# Patient Record
Sex: MALE | Race: WHITE | Employment: OTHER | URBAN - METROPOLITAN AREA
[De-identification: names, ages, dates, MRNs, and addresses within clinical notes are randomized per-mention and may not be internally consistent; named-entity substitution may affect disease eponyms.]

---

## 2024-02-22 ENCOUNTER — APPOINTMENT (OUTPATIENT)
Dept: GENERAL RADIOLOGY | Age: 79
DRG: 948 | End: 2024-02-22
Payer: COMMERCIAL

## 2024-02-22 ENCOUNTER — HOSPITAL ENCOUNTER (INPATIENT)
Age: 79
LOS: 3 days | Discharge: HOME HEALTH | DRG: 948 | End: 2024-02-25
Attending: EMERGENCY MEDICINE | Admitting: FAMILY MEDICINE
Payer: COMMERCIAL

## 2024-02-22 DIAGNOSIS — R09.02 HYPOXIA: ICD-10-CM

## 2024-02-22 DIAGNOSIS — R55 SYNCOPE AND COLLAPSE: ICD-10-CM

## 2024-02-22 DIAGNOSIS — R07.89 ATYPICAL CHEST PAIN: ICD-10-CM

## 2024-02-22 DIAGNOSIS — R50.9 FEVER, UNSPECIFIED FEVER CAUSE: Primary | ICD-10-CM

## 2024-02-22 LAB
ALBUMIN SERPL-MCNC: 3.6 G/DL (ref 3.5–5.2)
ALBUMIN/GLOB SERPL: 1 {RATIO} (ref 1–2.5)
ALP SERPL-CCNC: 88 U/L (ref 40–129)
ALT SERPL-CCNC: 20 U/L (ref 5–41)
ANION GAP SERPL CALCULATED.3IONS-SCNC: 9 MMOL/L (ref 9–17)
AST SERPL-CCNC: 16 U/L
BASOPHILS # BLD: 0.1 K/UL (ref 0–0.2)
BASOPHILS NFR BLD: 1 % (ref 0–2)
BILIRUB SERPL-MCNC: 1.5 MG/DL (ref 0.3–1.2)
BILIRUB UR QL STRIP: NEGATIVE
BUN SERPL-MCNC: 24 MG/DL (ref 8–23)
CALCIUM SERPL-MCNC: 8.5 MG/DL (ref 8.6–10.4)
CHLORIDE SERPL-SCNC: 99 MMOL/L (ref 98–107)
CLARITY UR: CLEAR
CO2 SERPL-SCNC: 31 MMOL/L (ref 20–31)
COLOR UR: YELLOW
COMMENT: ABNORMAL
CREAT SERPL-MCNC: 1.7 MG/DL (ref 0.7–1.2)
EOSINOPHIL # BLD: 0 K/UL (ref 0–0.4)
EOSINOPHILS RELATIVE PERCENT: 0 % (ref 1–4)
ERYTHROCYTE [DISTWIDTH] IN BLOOD BY AUTOMATED COUNT: 19 % (ref 12.5–15.4)
FLUAV AG SPEC QL: NEGATIVE
FLUBV AG SPEC QL: NEGATIVE
GFR SERPL CREATININE-BSD FRML MDRD: 41 ML/MIN/1.73M2
GLUCOSE SERPL-MCNC: 186 MG/DL (ref 70–99)
GLUCOSE UR STRIP-MCNC: ABNORMAL MG/DL
HCT VFR BLD AUTO: 48.4 % (ref 41–53)
HGB BLD-MCNC: 15.8 G/DL (ref 13.5–17.5)
HGB UR QL STRIP.AUTO: NEGATIVE
KETONES UR STRIP-MCNC: NEGATIVE MG/DL
LACTATE BLDV-SCNC: 1.4 MMOL/L (ref 0.5–1.9)
LEUKOCYTE ESTERASE UR QL STRIP: NEGATIVE
LYMPHOCYTES NFR BLD: 1 K/UL (ref 1–4.8)
LYMPHOCYTES RELATIVE PERCENT: 10 % (ref 24–44)
MCH RBC QN AUTO: 28.8 PG (ref 26–34)
MCHC RBC AUTO-ENTMCNC: 32.5 G/DL (ref 31–37)
MCV RBC AUTO: 88.5 FL (ref 80–100)
MONOCYTES NFR BLD: 0.5 K/UL (ref 0.1–1.2)
MONOCYTES NFR BLD: 5 % (ref 2–11)
NEUTROPHILS NFR BLD: 84 % (ref 36–66)
NEUTS SEG NFR BLD: 8.5 K/UL (ref 1.8–7.7)
NITRITE UR QL STRIP: NEGATIVE
PH UR STRIP: 7.5 [PH] (ref 5–8)
PLATELET # BLD AUTO: 133 K/UL (ref 140–450)
PMV BLD AUTO: 8.1 FL (ref 6–12)
POTASSIUM SERPL-SCNC: 4.8 MMOL/L (ref 3.7–5.3)
PROT SERPL-MCNC: 7.2 G/DL (ref 6.4–8.3)
PROT UR STRIP-MCNC: NEGATIVE MG/DL
RBC # BLD AUTO: 5.47 M/UL (ref 4.5–5.9)
SARS-COV-2 RDRP RESP QL NAA+PROBE: NOT DETECTED
SODIUM SERPL-SCNC: 139 MMOL/L (ref 135–144)
SP GR UR STRIP: 1.01 (ref 1–1.03)
SPECIMEN DESCRIPTION: NORMAL
TROPONIN I SERPL HS-MCNC: 83 NG/L (ref 0–22)
TROPONIN I SERPL HS-MCNC: 88 NG/L (ref 0–22)
UROBILINOGEN UR STRIP-ACNC: NORMAL EU/DL (ref 0–1)
WBC OTHER # BLD: 10.1 K/UL (ref 3.5–11)

## 2024-02-22 PROCEDURE — 85025 COMPLETE CBC W/AUTO DIFF WBC: CPT

## 2024-02-22 PROCEDURE — 81003 URINALYSIS AUTO W/O SCOPE: CPT

## 2024-02-22 PROCEDURE — 83605 ASSAY OF LACTIC ACID: CPT

## 2024-02-22 PROCEDURE — 1210000000 HC MED SURG R&B

## 2024-02-22 PROCEDURE — 36415 COLL VENOUS BLD VENIPUNCTURE: CPT

## 2024-02-22 PROCEDURE — 80053 COMPREHEN METABOLIC PANEL: CPT

## 2024-02-22 PROCEDURE — 87040 BLOOD CULTURE FOR BACTERIA: CPT

## 2024-02-22 PROCEDURE — G0378 HOSPITAL OBSERVATION PER HR: HCPCS

## 2024-02-22 PROCEDURE — 87635 SARS-COV-2 COVID-19 AMP PRB: CPT

## 2024-02-22 PROCEDURE — 84484 ASSAY OF TROPONIN QUANT: CPT

## 2024-02-22 PROCEDURE — 93005 ELECTROCARDIOGRAM TRACING: CPT | Performed by: EMERGENCY MEDICINE

## 2024-02-22 PROCEDURE — 71045 X-RAY EXAM CHEST 1 VIEW: CPT

## 2024-02-22 PROCEDURE — 99285 EMERGENCY DEPT VISIT HI MDM: CPT

## 2024-02-22 PROCEDURE — 87804 INFLUENZA ASSAY W/OPTIC: CPT

## 2024-02-22 RX ORDER — LOSARTAN POTASSIUM 25 MG/1
25 TABLET ORAL DAILY
Status: ON HOLD | COMMUNITY
Start: 2023-04-07

## 2024-02-22 RX ORDER — SODIUM CHLORIDE 9 MG/ML
INJECTION, SOLUTION INTRAVENOUS PRN
Status: DISCONTINUED | OUTPATIENT
Start: 2024-02-22 | End: 2024-02-25 | Stop reason: HOSPADM

## 2024-02-22 RX ORDER — ATORVASTATIN CALCIUM 40 MG/1
80 TABLET, FILM COATED ORAL NIGHTLY
Status: DISCONTINUED | OUTPATIENT
Start: 2024-02-23 | End: 2024-02-25 | Stop reason: HOSPADM

## 2024-02-22 RX ORDER — DEXTROSE MONOHYDRATE 100 MG/ML
INJECTION, SOLUTION INTRAVENOUS CONTINUOUS PRN
Status: DISCONTINUED | OUTPATIENT
Start: 2024-02-22 | End: 2024-02-25 | Stop reason: HOSPADM

## 2024-02-22 RX ORDER — CARVEDILOL 6.25 MG/1
6.25 TABLET ORAL 2 TIMES DAILY WITH MEALS
Status: ON HOLD | COMMUNITY
Start: 2023-07-08 | End: 2024-02-25 | Stop reason: HOSPADM

## 2024-02-22 RX ORDER — CALCITRIOL 0.25 UG/1
0.25 CAPSULE, LIQUID FILLED ORAL AS NEEDED
Status: ON HOLD | COMMUNITY
Start: 2023-07-08

## 2024-02-22 RX ORDER — SODIUM CHLORIDE 0.9 % (FLUSH) 0.9 %
10 SYRINGE (ML) INJECTION PRN
Status: DISCONTINUED | OUTPATIENT
Start: 2024-02-22 | End: 2024-02-25 | Stop reason: HOSPADM

## 2024-02-22 RX ORDER — TORSEMIDE 20 MG/1
1 TABLET ORAL DAILY
Status: ON HOLD | COMMUNITY
Start: 2024-01-19

## 2024-02-22 RX ORDER — MIRTAZAPINE 7.5 MG/1
7.5 TABLET, FILM COATED ORAL NIGHTLY
Status: ON HOLD | COMMUNITY
Start: 2023-02-18

## 2024-02-22 RX ORDER — DAPAGLIFLOZIN 10 MG/1
10 TABLET, FILM COATED ORAL DAILY
Status: ON HOLD | COMMUNITY
Start: 2023-12-07

## 2024-02-22 RX ORDER — NITROGLYCERIN 0.4 MG/1
0.4 TABLET SUBLINGUAL EVERY 5 MIN PRN
Status: DISCONTINUED | OUTPATIENT
Start: 2024-02-22 | End: 2024-02-25 | Stop reason: HOSPADM

## 2024-02-22 RX ORDER — ASPIRIN 81 MG/1
81 TABLET, CHEWABLE ORAL DAILY
Status: DISCONTINUED | OUTPATIENT
Start: 2024-02-23 | End: 2024-02-25 | Stop reason: HOSPADM

## 2024-02-22 RX ORDER — SODIUM CHLORIDE 0.9 % (FLUSH) 0.9 %
5-40 SYRINGE (ML) INJECTION EVERY 12 HOURS SCHEDULED
Status: DISCONTINUED | OUTPATIENT
Start: 2024-02-23 | End: 2024-02-25 | Stop reason: HOSPADM

## 2024-02-22 RX ORDER — ONDANSETRON 2 MG/ML
4 INJECTION INTRAMUSCULAR; INTRAVENOUS EVERY 6 HOURS PRN
Status: DISCONTINUED | OUTPATIENT
Start: 2024-02-22 | End: 2024-02-25 | Stop reason: HOSPADM

## 2024-02-22 RX ORDER — GLUCAGON 1 MG/ML
1 KIT INJECTION PRN
Status: DISCONTINUED | OUTPATIENT
Start: 2024-02-22 | End: 2024-02-25 | Stop reason: HOSPADM

## 2024-02-22 RX ORDER — ONDANSETRON 4 MG/1
4 TABLET, ORALLY DISINTEGRATING ORAL EVERY 8 HOURS PRN
Status: DISCONTINUED | OUTPATIENT
Start: 2024-02-22 | End: 2024-02-25 | Stop reason: HOSPADM

## 2024-02-22 RX ORDER — ACETAMINOPHEN 650 MG/1
650 SUPPOSITORY RECTAL EVERY 6 HOURS PRN
Status: DISCONTINUED | OUTPATIENT
Start: 2024-02-22 | End: 2024-02-25 | Stop reason: HOSPADM

## 2024-02-22 RX ORDER — POTASSIUM CHLORIDE 20 MEQ/1
40 TABLET, EXTENDED RELEASE ORAL PRN
Status: DISCONTINUED | OUTPATIENT
Start: 2024-02-22 | End: 2024-02-25 | Stop reason: HOSPADM

## 2024-02-22 RX ORDER — ACETAMINOPHEN 325 MG/1
650 TABLET ORAL EVERY 6 HOURS PRN
Status: DISCONTINUED | OUTPATIENT
Start: 2024-02-22 | End: 2024-02-25 | Stop reason: HOSPADM

## 2024-02-22 RX ORDER — SEMAGLUTIDE 0.68 MG/ML
INJECTION, SOLUTION SUBCUTANEOUS
Status: ON HOLD | COMMUNITY
Start: 2024-01-03

## 2024-02-22 RX ORDER — MAGNESIUM SULFATE 1 G/100ML
1000 INJECTION INTRAVENOUS PRN
Status: DISCONTINUED | OUTPATIENT
Start: 2024-02-22 | End: 2024-02-25 | Stop reason: HOSPADM

## 2024-02-22 RX ORDER — INSULIN LISPRO 100 [IU]/ML
0-4 INJECTION, SOLUTION INTRAVENOUS; SUBCUTANEOUS
Status: DISCONTINUED | OUTPATIENT
Start: 2024-02-23 | End: 2024-02-25 | Stop reason: HOSPADM

## 2024-02-22 RX ORDER — POTASSIUM CHLORIDE 7.45 MG/ML
10 INJECTION INTRAVENOUS PRN
Status: DISCONTINUED | OUTPATIENT
Start: 2024-02-22 | End: 2024-02-25 | Stop reason: HOSPADM

## 2024-02-22 RX ORDER — FAMOTIDINE 40 MG/1
40 TABLET, FILM COATED ORAL DAILY
Status: ON HOLD | COMMUNITY
Start: 2023-07-08

## 2024-02-22 RX ORDER — INSULIN LISPRO 100 [IU]/ML
0-4 INJECTION, SOLUTION INTRAVENOUS; SUBCUTANEOUS NIGHTLY
Status: DISCONTINUED | OUTPATIENT
Start: 2024-02-23 | End: 2024-02-25 | Stop reason: HOSPADM

## 2024-02-22 ASSESSMENT — PAIN - FUNCTIONAL ASSESSMENT: PAIN_FUNCTIONAL_ASSESSMENT: NONE - DENIES PAIN

## 2024-02-23 PROBLEM — R79.89 ELEVATED TROPONIN: Status: ACTIVE | Noted: 2024-02-23
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
## 2024-02-23 PROBLEM — I25.10 CAD (CORONARY ARTERY DISEASE): Status: ACTIVE | Noted: 2024-02-23

## 2024-02-23 PROBLEM — I50.9 CHF (CONGESTIVE HEART FAILURE) (HCC): Status: ACTIVE | Noted: 2024-02-23

## 2024-02-23 PROBLEM — I25.5 ISCHEMIC CARDIOMYOPATHY: Status: ACTIVE | Noted: 2024-02-23

## 2024-02-23 PROBLEM — I10 HYPERTENSION: Status: ACTIVE | Noted: 2024-02-23

## 2024-02-23 PROBLEM — Z95.810 ICD (IMPLANTABLE CARDIOVERTER-DEFIBRILLATOR) IN PLACE: Status: ACTIVE | Noted: 2024-02-23

## 2024-02-23 PROBLEM — E11.9 DIABETES MELLITUS (HCC): Status: ACTIVE | Noted: 2024-02-23

## 2024-02-23 LAB
ALBUMIN SERPL-MCNC: 3.3 G/DL (ref 3.5–5.2)
ALBUMIN/GLOB SERPL: 0.9 {RATIO} (ref 1–2.5)
ALP SERPL-CCNC: 81 U/L (ref 40–129)
ALT SERPL-CCNC: 18 U/L (ref 5–41)
ANION GAP SERPL CALCULATED.3IONS-SCNC: 8 MMOL/L (ref 9–17)
AST SERPL-CCNC: 16 U/L
B PARAP IS1001 DNA NPH QL NAA+NON-PROBE: NOT DETECTED
B PERT DNA SPEC QL NAA+PROBE: NOT DETECTED
BILIRUB SERPL-MCNC: 1.5 MG/DL (ref 0.3–1.2)
BUN SERPL-MCNC: 21 MG/DL (ref 8–23)
C PNEUM DNA NPH QL NAA+NON-PROBE: NOT DETECTED
CALCIUM SERPL-MCNC: 8.4 MG/DL (ref 8.6–10.4)
CHLORIDE SERPL-SCNC: 102 MMOL/L (ref 98–107)
CHOLEST SERPL-MCNC: 134 MG/DL
CHOLESTEROL/HDL RATIO: 2.9
CO2 SERPL-SCNC: 29 MMOL/L (ref 20–31)
CREAT SERPL-MCNC: 1.5 MG/DL (ref 0.7–1.2)
EKG ATRIAL RATE: 97 BPM
EKG P AXIS: 79 DEGREES
EKG P-R INTERVAL: 238 MS
EKG Q-T INTERVAL: 372 MS
EKG QRS DURATION: 148 MS
EKG QTC CALCULATION (BAZETT): 472 MS
EKG R AXIS: -43 DEGREES
EKG T AXIS: 100 DEGREES
EKG VENTRICULAR RATE: 97 BPM
ERYTHROCYTE [DISTWIDTH] IN BLOOD BY AUTOMATED COUNT: 18.8 % (ref 12.5–15.4)
FLUAV RNA NPH QL NAA+NON-PROBE: NOT DETECTED
FLUBV RNA NPH QL NAA+NON-PROBE: NOT DETECTED
GFR SERPL CREATININE-BSD FRML MDRD: 48 ML/MIN/1.73M2
GLUCOSE BLD-MCNC: 102 MG/DL (ref 75–110)
GLUCOSE BLD-MCNC: 128 MG/DL (ref 75–110)
GLUCOSE SERPL-MCNC: 98 MG/DL (ref 70–99)
HADV DNA NPH QL NAA+NON-PROBE: NOT DETECTED
HCOV 229E RNA NPH QL NAA+NON-PROBE: NOT DETECTED
HCOV HKU1 RNA NPH QL NAA+NON-PROBE: NOT DETECTED
HCOV NL63 RNA NPH QL NAA+NON-PROBE: NOT DETECTED
HCOV OC43 RNA NPH QL NAA+NON-PROBE: NOT DETECTED
HCT VFR BLD AUTO: 47.3 % (ref 41–53)
HDLC SERPL-MCNC: 47 MG/DL
HGB BLD-MCNC: 15.3 G/DL (ref 13.5–17.5)
HMPV RNA NPH QL NAA+NON-PROBE: NOT DETECTED
HPIV1 RNA NPH QL NAA+NON-PROBE: NOT DETECTED
HPIV2 RNA NPH QL NAA+NON-PROBE: NOT DETECTED
HPIV3 RNA NPH QL NAA+NON-PROBE: NOT DETECTED
HPIV4 RNA NPH QL NAA+NON-PROBE: NOT DETECTED
INR PPP: 1
LDLC SERPL CALC-MCNC: 60 MG/DL (ref 0–130)
M PNEUMO DNA NPH QL NAA+NON-PROBE: NOT DETECTED
MAGNESIUM SERPL-MCNC: 2.4 MG/DL (ref 1.6–2.6)
MCH RBC QN AUTO: 28.7 PG (ref 26–34)
MCHC RBC AUTO-ENTMCNC: 32.3 G/DL (ref 31–37)
MCV RBC AUTO: 88.6 FL (ref 80–100)
PLATELET # BLD AUTO: 121 K/UL (ref 140–450)
PMV BLD AUTO: 8.2 FL (ref 6–12)
POTASSIUM SERPL-SCNC: 4.3 MMOL/L (ref 3.7–5.3)
PROT SERPL-MCNC: 6.8 G/DL (ref 6.4–8.3)
PROTHROMBIN TIME: 10.9 SEC (ref 9.4–12.6)
RBC # BLD AUTO: 5.34 M/UL (ref 4.5–5.9)
RSV BY PCR: NEGATIVE
RSV RNA NPH QL NAA+NON-PROBE: NOT DETECTED
RV+EV RNA NPH QL NAA+NON-PROBE: NOT DETECTED
SARS-COV-2 RNA NPH QL NAA+NON-PROBE: NOT DETECTED
SODIUM SERPL-SCNC: 139 MMOL/L (ref 135–144)
SPECIMEN DESCRIPTION: NORMAL
SPECIMEN SOURCE: NORMAL
TRIGL SERPL-MCNC: 136 MG/DL
TROPONIN I SERPL HS-MCNC: 78 NG/L (ref 0–22)
TROPONIN I SERPL HS-MCNC: 90 NG/L (ref 0–22)
TSH SERPL DL<=0.05 MIU/L-ACNC: 2.96 UIU/ML (ref 0.3–5)
WBC OTHER # BLD: 9.1 K/UL (ref 3.5–11)

## 2024-02-23 PROCEDURE — 2580000003 HC RX 258: Performed by: FAMILY MEDICINE

## 2024-02-23 PROCEDURE — 84443 ASSAY THYROID STIM HORMONE: CPT

## 2024-02-23 PROCEDURE — 6370000000 HC RX 637 (ALT 250 FOR IP): Performed by: INTERNAL MEDICINE

## 2024-02-23 PROCEDURE — 85027 COMPLETE CBC AUTOMATED: CPT

## 2024-02-23 PROCEDURE — 84484 ASSAY OF TROPONIN QUANT: CPT

## 2024-02-23 PROCEDURE — G0378 HOSPITAL OBSERVATION PER HR: HCPCS

## 2024-02-23 PROCEDURE — 0202U NFCT DS 22 TRGT SARS-COV-2: CPT

## 2024-02-23 PROCEDURE — 36415 COLL VENOUS BLD VENIPUNCTURE: CPT

## 2024-02-23 PROCEDURE — 80053 COMPREHEN METABOLIC PANEL: CPT

## 2024-02-23 PROCEDURE — 87798 DETECT AGENT NOS DNA AMP: CPT

## 2024-02-23 PROCEDURE — 6370000000 HC RX 637 (ALT 250 FOR IP): Performed by: NURSE PRACTITIONER

## 2024-02-23 PROCEDURE — 82947 ASSAY GLUCOSE BLOOD QUANT: CPT

## 2024-02-23 PROCEDURE — 99223 1ST HOSP IP/OBS HIGH 75: CPT | Performed by: FAMILY MEDICINE

## 2024-02-23 PROCEDURE — 83735 ASSAY OF MAGNESIUM: CPT

## 2024-02-23 PROCEDURE — 2580000003 HC RX 258: Performed by: NURSE PRACTITIONER

## 2024-02-23 PROCEDURE — 97116 GAIT TRAINING THERAPY: CPT

## 2024-02-23 PROCEDURE — 85610 PROTHROMBIN TIME: CPT

## 2024-02-23 PROCEDURE — 97166 OT EVAL MOD COMPLEX 45 MIN: CPT

## 2024-02-23 PROCEDURE — 97535 SELF CARE MNGMENT TRAINING: CPT

## 2024-02-23 PROCEDURE — 97162 PT EVAL MOD COMPLEX 30 MIN: CPT

## 2024-02-23 PROCEDURE — 2060000000 HC ICU INTERMEDIATE R&B

## 2024-02-23 PROCEDURE — 80061 LIPID PANEL: CPT

## 2024-02-23 RX ORDER — CARVEDILOL 3.12 MG/1
3.12 TABLET ORAL 2 TIMES DAILY WITH MEALS
Status: DISCONTINUED | OUTPATIENT
Start: 2024-02-23 | End: 2024-02-25 | Stop reason: HOSPADM

## 2024-02-23 RX ORDER — SODIUM CHLORIDE 9 MG/ML
INJECTION, SOLUTION INTRAVENOUS CONTINUOUS
Status: DISCONTINUED | OUTPATIENT
Start: 2024-02-23 | End: 2024-02-24

## 2024-02-23 RX ORDER — LOSARTAN POTASSIUM 25 MG/1
25 TABLET ORAL DAILY
Status: DISCONTINUED | OUTPATIENT
Start: 2024-02-23 | End: 2024-02-25 | Stop reason: HOSPADM

## 2024-02-23 RX ADMIN — ASPIRIN 81 MG CHEWABLE TABLET 81 MG: 81 TABLET CHEWABLE at 08:23

## 2024-02-23 RX ADMIN — LOSARTAN POTASSIUM 25 MG: 25 TABLET, FILM COATED ORAL at 08:23

## 2024-02-23 RX ADMIN — EMPAGLIFLOZIN 10 MG: 10 TABLET, FILM COATED ORAL at 08:23

## 2024-02-23 RX ADMIN — CARVEDILOL 3.12 MG: 3.12 TABLET, FILM COATED ORAL at 17:37

## 2024-02-23 RX ADMIN — ATORVASTATIN CALCIUM 80 MG: 40 TABLET, FILM COATED ORAL at 19:33

## 2024-02-23 RX ADMIN — SODIUM CHLORIDE: 9 INJECTION, SOLUTION INTRAVENOUS at 09:49

## 2024-02-23 RX ADMIN — SODIUM CHLORIDE, PRESERVATIVE FREE 10 ML: 5 INJECTION INTRAVENOUS at 08:24

## 2024-02-23 RX ADMIN — APIXABAN 5 MG: 5 TABLET, FILM COATED ORAL at 19:33

## 2024-02-23 RX ADMIN — ACETAMINOPHEN 650 MG: 325 TABLET ORAL at 08:23

## 2024-02-23 RX ADMIN — CARVEDILOL 3.12 MG: 3.12 TABLET, FILM COATED ORAL at 08:23

## 2024-02-23 RX ADMIN — APIXABAN 5 MG: 5 TABLET, FILM COATED ORAL at 00:26

## 2024-02-23 RX ADMIN — APIXABAN 5 MG: 5 TABLET, FILM COATED ORAL at 08:23

## 2024-02-23 RX ADMIN — ATORVASTATIN CALCIUM 80 MG: 40 TABLET, FILM COATED ORAL at 00:26

## 2024-02-23 ASSESSMENT — PAIN SCALES - GENERAL
PAINLEVEL_OUTOF10: 3
PAINLEVEL_OUTOF10: 1

## 2024-02-23 ASSESSMENT — PAIN DESCRIPTION - ONSET: ONSET: ON-GOING

## 2024-02-23 ASSESSMENT — PAIN DESCRIPTION - LOCATION: LOCATION: GENERALIZED

## 2024-02-23 ASSESSMENT — PAIN DESCRIPTION - PAIN TYPE: TYPE: ACUTE PAIN

## 2024-02-23 ASSESSMENT — PAIN DESCRIPTION - ORIENTATION: ORIENTATION: OTHER (COMMENT)

## 2024-02-23 ASSESSMENT — PAIN DESCRIPTION - FREQUENCY: FREQUENCY: CONTINUOUS

## 2024-02-23 ASSESSMENT — PAIN - FUNCTIONAL ASSESSMENT: PAIN_FUNCTIONAL_ASSESSMENT: ACTIVITIES ARE NOT PREVENTED

## 2024-02-23 ASSESSMENT — PAIN DESCRIPTION - DESCRIPTORS: DESCRIPTORS: ACHING

## 2024-02-23 NOTE — H&P
(L) >60 mL/min/1.73m2    Calcium 8.4 (L) 8.6 - 10.4 mg/dL    Total Protein 6.8 6.4 - 8.3 g/dL    Albumin 3.3 (L) 3.5 - 5.2 g/dL    Albumin/Globulin Ratio 0.9 (L) 1.0 - 2.5    Total Bilirubin 1.5 (H) 0.3 - 1.2 mg/dL    Alkaline Phosphatase 81 40 - 129 U/L    ALT 18 5 - 41 U/L    AST 16 <40 U/L   Troponin    Collection Time: 02/23/24  5:00 AM   Result Value Ref Range    Troponin, High Sensitivity 90 (HH) 0 - 22 ng/L   TSH    Collection Time: 02/23/24  5:00 AM   Result Value Ref Range    TSH 2.96 0.30 - 5.00 uIU/mL   RSV RAPID ANTIGEN    Collection Time: 02/23/24 10:21 AM    Specimen: Nasopharyngeal Swab   Result Value Ref Range    Source .NASOPHARYNGEAL SWAB     RSV by PCR NEGATIVE NEGATIVE       Imaging/Diagnostics:    XR CHEST PORTABLE    Result Date: 2/23/2024  1. No acute cardiopulmonary process. 2. Cardiomegaly.       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 *      -Patient denies any syncope or collapse  -He did sustain a fall due to weakness  -Continue telemetry  -Cardiology evaluation, they did not recommend initiation of anticoagulation  -Viral PCR  -Blood cultures no growth to date  -Hold diuretics and initiate gentle hydration  -Restart

## 2024-02-23 NOTE — ED PROVIDER NOTES
[facility] Emergency Department  [address]  [facility] [address]  Phone: [phone]  Fax: [phone]      Pt Name: Kb Wilson  MRN:[id number]  Birthdate 1945  Date of evaluation: 2/22/2024    6  CHIEF COMPLAINT       Chief Complaint   Patient presents with    Fall     Pt reports he slide off chair RT generalized weakness     Cough     Pt reports productive cough that started yesterday afternoon , denies CP     Shortness of Breath       HISTORY OF PRESENT ILLNESS   78-year-old male presents to the emergency department today by EMS complaining of generalized fatigue and generalized myalgias.  He reports symptoms started yesterday as a \"cold.\"  He reports he did not sleep well last night and throughout the day has been progressively becoming weaker.  He reports that he has had a little bit of productive cough.  Some nasal congestion.  No fevers or chills.  No mitigating precipitating or exacerbating factors.  There is been no other contemporaneous evaluation or management except transported here by EMS when they got the EKG.  He denies chest pain.  No known sick contacts.    REVIEW OF SYSTEMS     Review of Systems   All other systems reviewed and are negative.        PAST MEDICAL HISTORY    has a past medical history of CHF (congestive heart failure) (HCC), Diabetes mellitus (HCC), and Hypertension.    SURGICAL HISTORY      has a past surgical history that includes Cardiac defibrillator placement.    CURRENT MEDICATIONS       Previous Medications    APIXABAN (ELIQUIS) 5 MG TABS TABLET    Take 1 tablet by mouth 2 times daily    CALCITRIOL (ROCALTROL) 0.25 MCG CAPSULE    Take 1 capsule by mouth as needed Three times weekly    CARVEDILOL (COREG) 6.25 MG TABLET    Take 1 tablet by mouth 2 times daily (with meals)    DAPAGLIFLOZIN (FARXIGA) 10 MG TABLET    Take 1 tablet by mouth daily    FAMOTIDINE (PEPCID) 40 MG TABLET    Take 1 tablet by mouth daily    INSULIN GLARGINE

## 2024-02-23 NOTE — CONSULTS
[facility] Consult      Name:   Kb Wilson :  1945   MRN:   [id number] Gender:   male   PCP:  Viridiana Taveras Age: 78 y.o.   PCP Fax:  [phone]     Hospital:          [facility]   encounter Date:     24        Reason for Consult: Known CAD    HPI: Kb Wilson is an 78 y.o. male admitted with weakness and fatigue.    Patient reports that over the past couple days he is felt like he has had a cold.  He has had a cough and felt intermittent chills and fevers.  With this, he had poor p.o. intake and had not eaten for over 24 hours.  He states he just laid in bed.  When he tried to get up to go get his medications he said he just Sinking lower and lower until he just ran out of energy and sat there.  He denies jennifer syncope.  Patient also denies any chest pain or significant shortness of breath.    Patient has known history of CAD and remote bypass approximately 15 years ago.  He states he had a cardiac catheterization approximately a year ago while he was living in [place].  He states he was told all bypass grafts were patent.    Patient follows with Dr. Sarah Beth Little.  He has been on goal-directed medical therapy and has a known ischemic cardiomyopathy with EF 25% range.  Patient has ICD last checked last fall and functioning appropriately.  He also has a history of PAF.    PAST MED/SURG HISTORY:     Past Medical History:   Diagnosis Date    CHF (congestive heart failure) (HCC)     Diabetes mellitus (HCC)     Hypertension        Past Surgical History:   Procedure Laterality Date    CARDIAC DEFIBRILLATOR PLACEMENT         Social History     Socioeconomic History    Marital status:      Spouse name: Not on file    Number of children: Not on file    Years of education: Not on file    Highest education level: Not on file   Occupational History    Not on file   Tobacco Use    Smoking status: Never    Smokeless tobacco: Never   Vaping Use    Vaping Use: Never used   Substance and

## 2024-02-24 LAB
ANION GAP SERPL CALCULATED.3IONS-SCNC: 6 MMOL/L (ref 9–17)
BASOPHILS # BLD: 0 K/UL (ref 0–0.2)
BASOPHILS NFR BLD: 0 % (ref 0–2)
BUN SERPL-MCNC: 25 MG/DL (ref 8–23)
CALCIUM SERPL-MCNC: 8.4 MG/DL (ref 8.6–10.4)
CHLORIDE SERPL-SCNC: 101 MMOL/L (ref 98–107)
CO2 SERPL-SCNC: 30 MMOL/L (ref 20–31)
CREAT SERPL-MCNC: 1.6 MG/DL (ref 0.7–1.2)
EOSINOPHIL # BLD: 0.1 K/UL (ref 0–0.4)
EOSINOPHILS RELATIVE PERCENT: 1 % (ref 1–4)
ERYTHROCYTE [DISTWIDTH] IN BLOOD BY AUTOMATED COUNT: 18.9 % (ref 12.5–15.4)
GFR SERPL CREATININE-BSD FRML MDRD: 44 ML/MIN/1.73M2
GLUCOSE SERPL-MCNC: 195 MG/DL (ref 70–99)
HCT VFR BLD AUTO: 43.8 % (ref 41–53)
HGB BLD-MCNC: 14.1 G/DL (ref 13.5–17.5)
LYMPHOCYTES NFR BLD: 1.2 K/UL (ref 1–4.8)
LYMPHOCYTES RELATIVE PERCENT: 21 % (ref 24–44)
MCH RBC QN AUTO: 28.5 PG (ref 26–34)
MCHC RBC AUTO-ENTMCNC: 32.2 G/DL (ref 31–37)
MCV RBC AUTO: 88.5 FL (ref 80–100)
MONOCYTES NFR BLD: 0.5 K/UL (ref 0.1–1.2)
MONOCYTES NFR BLD: 8 % (ref 2–11)
NEUTROPHILS NFR BLD: 70 % (ref 36–66)
NEUTS SEG NFR BLD: 4.2 K/UL (ref 1.8–7.7)
PLATELET # BLD AUTO: 119 K/UL (ref 140–450)
PMV BLD AUTO: 8 FL (ref 6–12)
POTASSIUM SERPL-SCNC: 4.8 MMOL/L (ref 3.7–5.3)
RBC # BLD AUTO: 4.94 M/UL (ref 4.5–5.9)
SODIUM SERPL-SCNC: 137 MMOL/L (ref 135–144)
WBC OTHER # BLD: 6 K/UL (ref 3.5–11)

## 2024-02-24 PROCEDURE — 6370000000 HC RX 637 (ALT 250 FOR IP): Performed by: NURSE PRACTITIONER

## 2024-02-24 PROCEDURE — 85025 COMPLETE CBC W/AUTO DIFF WBC: CPT

## 2024-02-24 PROCEDURE — 36415 COLL VENOUS BLD VENIPUNCTURE: CPT

## 2024-02-24 PROCEDURE — 6370000000 HC RX 637 (ALT 250 FOR IP): Performed by: INTERNAL MEDICINE

## 2024-02-24 PROCEDURE — 2580000003 HC RX 258: Performed by: NURSE PRACTITIONER

## 2024-02-24 PROCEDURE — G0378 HOSPITAL OBSERVATION PER HR: HCPCS

## 2024-02-24 PROCEDURE — 80048 BASIC METABOLIC PNL TOTAL CA: CPT

## 2024-02-24 PROCEDURE — 2060000000 HC ICU INTERMEDIATE R&B

## 2024-02-24 PROCEDURE — 99232 SBSQ HOSP IP/OBS MODERATE 35: CPT | Performed by: FAMILY MEDICINE

## 2024-02-24 RX ADMIN — EMPAGLIFLOZIN 10 MG: 10 TABLET, FILM COATED ORAL at 09:09

## 2024-02-24 RX ADMIN — ATORVASTATIN CALCIUM 80 MG: 40 TABLET, FILM COATED ORAL at 22:45

## 2024-02-24 RX ADMIN — ASPIRIN 81 MG CHEWABLE TABLET 81 MG: 81 TABLET CHEWABLE at 09:08

## 2024-02-24 RX ADMIN — CARVEDILOL 3.12 MG: 3.12 TABLET, FILM COATED ORAL at 17:18

## 2024-02-24 RX ADMIN — SODIUM CHLORIDE, PRESERVATIVE FREE 10 ML: 5 INJECTION INTRAVENOUS at 22:45

## 2024-02-24 RX ADMIN — APIXABAN 5 MG: 5 TABLET, FILM COATED ORAL at 22:45

## 2024-02-24 RX ADMIN — APIXABAN 5 MG: 5 TABLET, FILM COATED ORAL at 09:09

## 2024-02-24 RX ADMIN — SODIUM CHLORIDE, PRESERVATIVE FREE 10 ML: 5 INJECTION INTRAVENOUS at 09:08

## 2024-02-24 RX ADMIN — INSULIN LISPRO 1 UNITS: 100 INJECTION, SOLUTION INTRAVENOUS; SUBCUTANEOUS at 12:19

## 2024-02-24 RX ADMIN — CARVEDILOL 3.12 MG: 3.12 TABLET, FILM COATED ORAL at 09:08

## 2024-02-25 ENCOUNTER — APPOINTMENT (OUTPATIENT)
Dept: GENERAL RADIOLOGY | Age: 79
DRG: 948 | End: 2024-02-25
Payer: COMMERCIAL

## 2024-02-25 VITALS
HEART RATE: 78 BPM | HEIGHT: 75 IN | OXYGEN SATURATION: 96 % | SYSTOLIC BLOOD PRESSURE: 109 MMHG | RESPIRATION RATE: 14 BRPM | BODY MASS INDEX: 33.83 KG/M2 | TEMPERATURE: 98.4 F | DIASTOLIC BLOOD PRESSURE: 68 MMHG | WEIGHT: 272.05 LBS

## 2024-02-25 PROCEDURE — 99239 HOSP IP/OBS DSCHRG MGMT >30: CPT | Performed by: FAMILY MEDICINE

## 2024-02-25 PROCEDURE — 2060000000 HC ICU INTERMEDIATE R&B

## 2024-02-25 PROCEDURE — G0378 HOSPITAL OBSERVATION PER HR: HCPCS

## 2024-02-25 PROCEDURE — 97535 SELF CARE MNGMENT TRAINING: CPT

## 2024-02-25 PROCEDURE — 6370000000 HC RX 637 (ALT 250 FOR IP): Performed by: INTERNAL MEDICINE

## 2024-02-25 PROCEDURE — 2580000003 HC RX 258: Performed by: NURSE PRACTITIONER

## 2024-02-25 PROCEDURE — 2700000000 HC OXYGEN THERAPY PER DAY

## 2024-02-25 PROCEDURE — 71045 X-RAY EXAM CHEST 1 VIEW: CPT

## 2024-02-25 PROCEDURE — 6370000000 HC RX 637 (ALT 250 FOR IP): Performed by: NURSE PRACTITIONER

## 2024-02-25 PROCEDURE — 97116 GAIT TRAINING THERAPY: CPT

## 2024-02-25 RX ORDER — CARVEDILOL 3.12 MG/1
3.12 TABLET ORAL 2 TIMES DAILY WITH MEALS
Qty: 60 TABLET | Refills: 3 | Status: ON HOLD | OUTPATIENT
Start: 2024-02-25

## 2024-02-25 RX ORDER — ASPIRIN 81 MG/1
81 TABLET, CHEWABLE ORAL DAILY
Qty: 30 TABLET | Refills: 3 | Status: ON HOLD | OUTPATIENT
Start: 2024-02-26

## 2024-02-25 RX ORDER — ATORVASTATIN CALCIUM 80 MG/1
80 TABLET, FILM COATED ORAL NIGHTLY
Qty: 30 TABLET | Refills: 3 | Status: ON HOLD | OUTPATIENT
Start: 2024-02-25

## 2024-02-25 RX ADMIN — ASPIRIN 81 MG CHEWABLE TABLET 81 MG: 81 TABLET CHEWABLE at 09:43

## 2024-02-25 RX ADMIN — EMPAGLIFLOZIN 10 MG: 10 TABLET, FILM COATED ORAL at 09:44

## 2024-02-25 RX ADMIN — SODIUM CHLORIDE, PRESERVATIVE FREE 10 ML: 5 INJECTION INTRAVENOUS at 09:43

## 2024-02-25 RX ADMIN — APIXABAN 5 MG: 5 TABLET, FILM COATED ORAL at 09:44

## 2024-02-25 RX ADMIN — CARVEDILOL 3.12 MG: 3.12 TABLET, FILM COATED ORAL at 09:44

## 2024-02-25 NOTE — DISCHARGE INSTR - COC
Continuity of Care Form    Patient Name: Kb Wilson   :  1945  MRN:  [id number]    Admit date:  2024  Discharge date:  2024    Code Status Order: Full Code   Advance Directives:     Admitting Physician:  Marlena Hedrick MD  PCP: Viridiana Taveras    Discharging Nurse: Annie  Discharging Hospital Unit/Room#: 328/328-01  Discharging Unit Phone Number: [phone]    Emergency Contact:   Extended Emergency Contact Information  Primary Emergency Contact: Jarrett Salgado  Mobile Phone: [phone]  Relation: Other   needed? No    Past Surgical History:  Past Surgical History:   Procedure Laterality Date    CARDIAC DEFIBRILLATOR PLACEMENT      CORONARY ARTERY BYPASS GRAFT         Immunization History:     There is no immunization history on file for this patient.    Active Problems:  Patient Active Problem List   Diagnosis Code    Fall W19.XXXA    Hypertension I10    Diabetes mellitus (HCC) E11.9    CHF (congestive heart failure) (HCC) I50.9    CAD (coronary artery disease) I25.10    Elevated troponin R79.89    Ischemic cardiomyopathy I25.5    ICD (implantable cardioverter-defibrillator) in place Z95.810       Isolation/Infection:   Isolation            Droplet Plus          Patient Infection Status       None to display                     Nurse Assessment:  Last Vital Signs: /68   Pulse 78   Temp 98.4 °F (36.9 °C) (Oral)   Resp 14   Ht 1.905 m (6' 3\")   Wt 123.4 kg (272 lb 0.8 oz)   SpO2 96%   BMI 34.00 kg/m²     Last documented pain score (0-10 scale): Pain Level: 1  Last Weight:   Wt Readings from Last 1 Encounters:   24 123.4 kg (272 lb 0.8 oz)     Mental Status:  oriented and alert    IV Access:  - None    Nursing Mobility/ADLs:  Walking   Independent w/ walker  Transfer  Independent w/ walker  Bathing  Set up assistance  Dressing  Set up assistance  Toileting  Independent w/ walker  Feeding  Independent  Med Admin  Independent  Med Delivery   whole    Wound Care

## 2024-02-25 NOTE — PLAN OF CARE
Problem: Discharge Planning  Goal: Discharge to home or other facility with appropriate resources  2/23/2024 1035 by Annie Caban RN  Outcome: Progressing  Flowsheets (Taken 2/23/2024 0823)  Discharge to home or other facility with appropriate resources: Identify barriers to discharge with patient and caregiver  2/23/2024 0417 by Ana Lion RN  Outcome: Progressing  Flowsheets (Taken 2/23/2024 0030)  Discharge to home or other facility with appropriate resources: Identify barriers to discharge with patient and caregiver     Problem: Safety - Adult  Goal: Free from fall injury  2/23/2024 1035 by Annie Caban RN  Outcome: Progressing  2/23/2024 0417 by Ana Lion RN  Outcome: Progressing     Problem: ABCDS Injury Assessment  Goal: Absence of physical injury  2/23/2024 1035 by Annie Caban RN  Outcome: Progressing  2/23/2024 0417 by Ana Lion RN  Outcome: Progressing     Problem: Musculoskeletal - Adult  Goal: Return mobility to safest level of function  Outcome: Progressing  Goal: Return ADL status to a safe level of function  Outcome: Progressing    Care plan reviewed with patient.  Patient verbalize understanding of the plan of care at this time.   
  Problem: Discharge Planning  Goal: Discharge to home or other facility with appropriate resources  2/25/2024 0937 by Annie Caban RN  Outcome: Progressing  Flowsheets (Taken 2/25/2024 0935)  Discharge to home or other facility with appropriate resources: Identify barriers to discharge with patient and caregiver  2/25/2024 0448 by Jamila Caldwell RN  Outcome: Progressing     Problem: Safety - Adult  Goal: Free from fall injury  2/25/2024 0937 by Annie Caban RN  Outcome: Progressing  2/25/2024 0448 by Jamila Caldwell RN  Outcome: Progressing     Problem: ABCDS Injury Assessment  Goal: Absence of physical injury  2/25/2024 0937 by Annie Caban RN  Outcome: Progressing  2/25/2024 0448 by Jamila Caldwell RN  Outcome: Progressing     Problem: Musculoskeletal - Adult  Goal: Return mobility to safest level of function  2/25/2024 0937 by Annie Caban RN  Outcome: Progressing  2/25/2024 0448 by Jamila Caldwell RN  Outcome: Progressing  Goal: Return ADL status to a safe level of function  2/25/2024 0937 by Annie Caban RN  Outcome: Progressing  2/25/2024 0448 by Jamila Caldwell RN  Outcome: Progressing     Problem: Chronic Conditions and Co-morbidities  Goal: Patient's chronic conditions and co-morbidity symptoms are monitored and maintained or improved  2/25/2024 0937 by Annie Caban RN  Outcome: Progressing  2/25/2024 0448 by Jamila Caldwell RN  Outcome: Progressing     Problem: Pain  Goal: Verbalizes/displays adequate comfort level or baseline comfort level  2/25/2024 0937 by Annie Caban RN  Outcome: Progressing  2/25/2024 0448 by Jamila Caldwell RN  Outcome: Progressing     Care plan reviewed with patient.  Patient verbalized understanding of the plan of care at this time.  
  Problem: Discharge Planning  Goal: Discharge to home or other facility with appropriate resources  Outcome: Progressing     Problem: Safety - Adult  Goal: Free from fall injury  Outcome: Progressing     Problem: ABCDS Injury Assessment  Goal: Absence of physical injury  Outcome: Progressing     Problem: Musculoskeletal - Adult  Goal: Return mobility to safest level of function  Outcome: Progressing     Problem: Musculoskeletal - Adult  Goal: Return ADL status to a safe level of function  Outcome: Progressing     Problem: Chronic Conditions and Co-morbidities  Goal: Patient's chronic conditions and co-morbidity symptoms are monitored and maintained or improved  Outcome: Progressing     Problem: Pain  Goal: Verbalizes/displays adequate comfort level or baseline comfort level  Outcome: Progressing     
  Problem: Discharge Planning  Goal: Discharge to home or other facility with appropriate resources  Outcome: Progressing  Flowsheets (Taken 2/23/2024 0030)  Discharge to home or other facility with appropriate resources: Identify barriers to discharge with patient and caregiver     Problem: Safety - Adult  Goal: Free from fall injury  Outcome: Progressing   NO FALLS OR INJURIES THIS SHIFT, BED IN LOWEST POSITION, BRAKES ON, BED ALARM ON, CALL LIGHT IN REACH, SIDE RAILS UP X2.    Problem: ABCDS Injury Assessment  Goal: Absence of physical injury  Outcome: Progressing     
  Problem: Discharge Planning  Goal: Discharge to home or other facility with appropriate resources  Outcome: Progressing  Flowsheets (Taken 2/24/2024 1867)  Discharge to home or other facility with appropriate resources: Identify barriers to discharge with patient and caregiver     Problem: Safety - Adult  Goal: Free from fall injury  Outcome: Progressing     Problem: ABCDS Injury Assessment  Goal: Absence of physical injury  Outcome: Progressing     Problem: Musculoskeletal - Adult  Goal: Return mobility to safest level of function  Outcome: Progressing  Goal: Return ADL status to a safe level of function  Outcome: Progressing     Problem: Chronic Conditions and Co-morbidities  Goal: Patient's chronic conditions and co-morbidity symptoms are monitored and maintained or improved  Outcome: Progressing     Problem: Pain  Goal: Verbalizes/displays adequate comfort level or baseline comfort level  Outcome: Progressing    Care plan reviewed with patient.  Patient verbalized understanding of the plan of care at this time     
RN  Outcome: Completed  2/25/2024 0937 by Annie Caban RN  Outcome: Progressing  2/25/2024 0937 by Annie Caban RN  Outcome: Progressing  2/25/2024 0448 by Jamila Caldwell RN  Outcome: Progressing     Problem: Pain  Goal: Verbalizes/displays adequate comfort level or baseline comfort level  2/25/2024 1517 by Annie Caban RN  Outcome: Completed  2/25/2024 0937 by Annie Caban RN  Outcome: Progressing  2/25/2024 0937 by Annie Caban RN  Outcome: Progressing  2/25/2024 0448 by Jamila Caldwell RN  Outcome: Progressing     Problem: Respiratory - Adult  Goal: Achieves optimal ventilation and oxygenation  2/25/2024 1517 by Annie Caban RN  Outcome: Completed  2/25/2024 0937 by Annie Caban RN  Outcome: Progressing     D/C edu and paperwork complete. Patient verbalized understanding with no questions at this time. Home O2 delivered to patient's room. Home therapy set up per . PIV removed without complications. Awaiting patient's ride at this time.    1610: Patient transported to family's car via wheelchair. Care released.

## 2024-02-25 NOTE — DISCHARGE INSTR - DIET

## 2024-02-25 NOTE — CARE COORDINATION
Patient gave me choices of [facility] for O2 and [facility] for Home Care.  Referrals sent.  Called [email or web address] and they will get back to me.    1320-Called [facility] and spoke with Justyna, she will have our rep. Get back to us.    1329-[facility] can supply patient  oxygen and will be here shortly to deliver.     1345-[facility] can take patient/Diana at [phone].    1626-Informed Libbey at [facility] that patient has been discharged.

## 2024-02-25 NOTE — CARE COORDINATION
Case Management Assessment  Initial Evaluation    Date/Time of Evaluation: 2/25/2024 11:40 AM  Assessment Completed by: Yeimy Jimenez    If patient is discharged prior to next notation, then this note serves as note for discharge by case management.    Patient Name: Kb Wilson                   YOB: 1945  Diagnosis: Syncope and collapse [R55]  Atypical chest pain [R07.89]  Fever, unspecified fever cause [R50.9]  Elevated troponin [R79.89]                   Date / Time: 2/22/2024  6:51 PM    Patient Admission Status: Inpatient   Readmission Risk (Low < 19, Mod (19-27), High > 27): No data recorded  Current PCP: Viridiana Taveras  PCP verified by CM? Yes    Chart Reviewed: Yes      History Provided by: Patient  Patient Orientation: Alert and Oriented    Patient Cognition: Alert    Hospitalization in the last 30 days (Readmission):  No    If yes, Readmission Assessment in CM Navigator will be completed.    Advance Directives:      Code Status: Full Code   Patient's Primary Decision Maker is: Legal Next of Kin (brother)      Discharge Planning:    Patient lives with: Alone Type of Home: Other (Comment) (szymanski)  Primary Care Giver: Self  Patient Support Systems include: Family Members   Current Financial resources: Medicare  Current community resources: None  Current services prior to admission: Durable Medical Equipment (has a walker but has not used before admit)            Current DME: Walker            Type of Home Care services:  OT, PT (needs)    ADLS  Prior functional level: Independent in ADLs/IADLs  Current functional level: Independent in ADLs/IADLs    PT AM-PAC: 17 /24  OT AM-PAC: 21 /24    Family can provide assistance at DC: No (little)  Would you like Case Management to discuss the discharge plan with any other family members/significant others, and if so, who? Yes (brother if needed)  Plans to Return to Present Housing: Yes (with home care)  Other Identified Issues/Barriers to

## 2024-02-25 NOTE — PROGRESS NOTES
[facility] Samuel Awan M.D., M.H.A.  Dagoberto Baker M.D., M.B.A.  RAFAELA Hough P.A.C.    [facility]  [address] [facility] [facility]    Cardio-Oncology Service  [address], [address]  [place] [address]   [address] [place] [address]  Phone: [phone]   [place] [address]   Phone: [phone]  Fax: [phone]    [phone]   Fax: [phone]          DAILY HOSPITAL PROGRESS NOTE     # DAYS IN HOSPITAL: 0  ADMIT DATE: 2/22/2024        SUBJECTIVE:     Follow-up for patient with weakness and possible febrile illness.  History of significant coronary issues in the past with bypass surgery and ischemic cardiomyopathy status post ICD.    On admission he had poor p.o. intake and therefore diuretics were held.  Other goal-directed medical therapy initiated.    No new issues overnight per patient or nursing.  Still very weak.  PT OT have been consulted but have not seen the patient yet.        VITALS:       Vitals:    02/23/24 1921 02/23/24 2350 02/24/24 0820 02/24/24 0915   BP: 129/61 106/79 123/81    Pulse: 76 75 87    Resp: 16 16     Temp: 98.2 °F (36.8 °C) 98.2 °F (36.8 °C) 98.4 °F (36.9 °C)    TempSrc: Oral Oral Oral    SpO2: 90% 90% (!) 88% 93%   Weight:       Height:         I/O last 3 completed shifts:  In: 360 [P.O.:360]  Out: 1200 [Urine:1200]  I/O this shift:  In: 120 [P.O.:120]  Out: 350 [Urine:350]      PHYSICAL EXAM:     General: WDWN in NAD. A and O x 3  HEENT: EOMI, Sclera anicteric, Conjunctiva pink  Neck: Supple, No JVP or HJR, No carotid bruits  Lungs: Clear to A & P no rales  Heart: RRR  Abdomen: soft non tender, good bowel sounds, No HSM  Ext: no edema,  Skin: no discoloration  Vascular: distal pulses intact      CURRENT MEDICATIONS:      carvedilol  3.125 mg Oral BID WC    empagliflozin  10 mg Oral Daily    [Held by provider] losartan 
Home Oxygen Evaluation    Home Oxygen Evaluation completed.    Patient is on 2 liters per minute via nasal.  Resting SpO2 = 92%  Resting SpO2 on room air = 85%    Aniyah Elizondo RCP  10:45 AM  
Physical Therapy  Facility/Department: [address]  Physical Therapy Daily Treatment Note    Name: Kb Wilson  : 1945  MRN: [id number]  Date of Service: 2024    Discharge Recommendations:  Patient would benefit from continued therapy after discharge   PT Equipment Recommendations  Equipment Needed: No (has rolling walker at home)      Patient Diagnosis(es): The primary encounter diagnosis was Fever, unspecified fever cause. Diagnoses of Syncope and collapse and Atypical chest pain were also pertinent to this visit.  Past Medical History:  has a past medical history of CAD (coronary artery disease), CHF (congestive heart failure) (HCC), Diabetes mellitus (HCC), and Hypertension.  Past Surgical History:  has a past surgical history that includes Cardiac defibrillator placement and Coronary artery bypass graft.    Assessment   Body Structures, Functions, Activity Limitations Requiring Skilled Therapeutic Intervention: Decreased functional mobility ;Decreased strength;Decreased endurance;Decreased balance    Assessment: Pt presents with generalized weakness and impaired activity tolerance due to syncope and collapse. Pt lives alone and is Independent at baseline without device. Pt currently demonstrating supervision with bed mobility, supervision transfers, pt ambulated 140ft with rolling walker and supervision on 2L O2. Pt with chronic SOB and able to hold full conversation during functional activities. Pt aware of SOB and need for rest breaks. Pt should be able to return to previous living arrangement. Pt will benefit from continued skilled PT for endurance, safety and functional mobility training while in the hospital and at discharge.    Requires PT Follow-Up: Yes    Activity Tolerance  Activity Tolerance: Patient limited by endurance  Activity Tolerance Comments: Pt aware of chronic endurance issues     Plan   Physical Therapy Plan  General Plan:  (5-6x/week)  Current Treatment Recommendations: 
SPIRITUAL CARE DEPARTMENT - [facility]  PROGRESS NOTE    Room # 328/328-01   Name: Kb Wilson               Reason for visit: Routine    I visited the patient.    Admit Date & Time: 2/22/2024  6:51 PM    Assessment:  Kb Wilson is a 78 y.o. male in the hospital because \"fall\". Upon entering the room pt was lying in bed, with eyes closed       Intervention:  Writer offered  silent prayer for pt     Outcome:  Pt did not respond     Plan:  Chaplains will remain available to offer spiritual and emotional support as needed.    Electronically signed by Chaplain Negin, on 2/23/2024 at 1:51 PM.  Spiritual Care Department  [facility] -       02/23/24 1346   Encounter Summary   Service Provided For: Patient   Referral/Consult From: [facility]   Support System Unknown   Last Encounter  02/23/24   Complexity of Encounter Low   Begin Time 1325   End Time  1327   Total Time Calculated 2 min   Spiritual/Emotional needs   Type Spiritual Support   Assessment/Intervention/Outcome   Assessment Unable to assess   Intervention Prayer (assurance of)/[place]   Outcome Did not respond       
[facility]  Office: [phone]  Omar Bills DO, Salvador San DO, Lewis Celeste DO, Neo Stallings DO, Jaz Castellanos MD, Stacia Aponte MD, Carl Barajas MD, Marlena Hedrick MD,  Christian Quintero MD, Gregoria Marie MD, Riri Dent MD,  Yi Clay DO, Bethany Hall MD, Acosta Jimenez MD, Jarrett Bills DO, Diann Mari MD,  Oscar Fofana DO, Karen Bryant MD, Jemma Osei MD, Bonny Pimentel MD, Christian Sauer MD,  Nash Ramires MD, Riki Small MD, Veto Webster MD, Melia Alcantar MD, Mick Cast MD, Katelin Smart MD, Guy Tijerina DO, Alcides Sandoval DO, Saurabh Montano MD,  Bartolo Field MD, Shirley Waterhouse, CNP,  Olga Lidia Van CNP, Dagoberto Bobo, CNP,  Brandie Hammond, DNP, Misti Boone, CNP, Tania Sim, CNP, Kiah Montalvo CNP, Donna Meade CNP, Inés Rosas, CNP, Sylvie Calderon, PA-C, Dana Manzano, PA-C, Edith Hobson, CNP, Katherin Joyce, CNP, Ana Anderson, CNP, Jada Valerio, CNS, Tess Hernandez, CNP, Xnea Bailon CNP, Tracy Schwab, CNP         [facility]   IN-PATIENT SERVICE   [facility]    Progress Note    2/25/2024    11:21 AM    Name:   Kb Wilson  MRN:     [id number]     Acct:      [id number]   Room:   328/328-01   Day:  0  Admit Date:  2/22/2024  6:51 PM    PCP:   Viridiana Taveras  Code Status:  Full Code    Subjective:     C/C:   Chief Complaint   Patient presents with    Fall     Pt reports he slide off chair RT generalized weakness     Cough     Pt reports productive cough that started yesterday afternoon , denies CP     Shortness of Breath     Interval History Status: improved.     Patient seen and examined at bedside, no acute events overnight.  He is feeling much better today, better energy appetite and intake  He worked with therapy yesterday  Received gentle hydration  Now hypoxic needing oxygen  Patient vitals, labs and all providers notes were reviewed,from overnight shift and morning updates were 
order 2/23/24, Per RN on 2/25 pt has tested (-) for \"everything\", no COVID isolation    Subjective   General  Patient assessed for rehabilitation services?: Yes  Response to previous treatment: Patient with no complaints from previous session  Family / Caregiver Present: No  Subjective  Subjective: Patient did not report pain this date.  General Comment  Comments: RN OK for TX. Patient agreeable and cooperative.            Objective              Safety Devices  Type of Devices: Chair alarm in place;Gait belt;Nurse notified;Left in chair;Call light within reach  Restraints  Restraints Initially in Place: No    Balance  Sitting: Impaired (static-IND, dynamic-supervision)  Standing: Impaired (static/dynamic-supervision)  Toilet Transfers  Toilet Transfers Comments: declined need     ADL  Feeding: Independent  Grooming: Modified independent   Grooming Skilled Clinical Factors: standing at sink to brush teeth, wash face  LE Dressing: Supervision  LE Dressing Skilled Clinical Factors: seated EOB to don socks with increased time and effort, supervision standing for clothing management  Toileting Skilled Clinical Factors: declined needs, using urinal independently  Functional Mobility: Supervision;Modified independent   Functional Mobility Skilled Clinical Factors: MOD IND bed mob, sit<>stand/amb with RW supervision  Skin Care: Soap and water       Bed mobility  Rolling to Right: Modified independent (use of bed rail)  Supine to Sit: Supervision (Increase time and effort. Use of bed rail)  Scooting: Independent  Bed Mobility Comments: Pt sat on side of bed to and was able to adjust socks independently    Transfers  Sit to stand: Supervision  Stand to sit: Supervision  Transfer Comments: verbal cues for safety with use of walker     Cognition  Overall Cognitive Status: WFL  Orientation  Overall Orientation Status: Within Normal Limits  Orientation Level: Oriented X4                  Education Given To: Patient  Education 
  Neurological:  Positive for weakness. Negative for dizziness, light-headedness, numbness and headaches.   All other systems reviewed and are negative.      Medications:     Allergies:    Allergies   Allergen Reactions    Barium-Containing Compounds     Iodinated Contrast Media     Hydromorphone Hallucinations     weird dreams       Current Meds:   Scheduled Meds:    carvedilol  3.125 mg Oral BID WC    empagliflozin  10 mg Oral Daily    [Held by provider] losartan  25 mg Oral Daily    sodium chloride flush  5-40 mL IntraVENous 2 times per day    atorvastatin  80 mg Oral Nightly    apixaban  5 mg Oral BID    insulin lispro  0-4 Units SubCUTAneous TID     insulin lispro  0-4 Units SubCUTAneous Nightly    aspirin  81 mg Oral Daily     Continuous Infusions:    sodium chloride 50 mL/hr at 24 0949    dextrose      sodium chloride       PRN Meds: glucose, dextrose bolus **OR** dextrose bolus, glucagon (rDNA), dextrose, sodium chloride flush, sodium chloride, potassium chloride **OR** potassium alternative oral replacement **OR** potassium chloride, magnesium sulfate, ondansetron **OR** ondansetron, acetaminophen **OR** acetaminophen, magnesium hydroxide, nitroGLYCERIN, perflutren lipid microspheres    Data:     Past Medical History:   has a past medical history of CAD (coronary artery disease), CHF (congestive heart failure) (HCC), Diabetes mellitus (HCC), and Hypertension.    Social History:   reports that he has never smoked. He has never used smokeless tobacco. He reports current alcohol use. He reports that he does not use drugs.     Family History: History reviewed. No pertinent family history.    Vitals:  /79   Pulse 75   Temp 98.2 °F (36.8 °C) (Oral)   Resp 16   Ht 1.905 m (6' 3\")   Wt 123.4 kg (272 lb 0.8 oz)   SpO2 90%   BMI 34.00 kg/m²   Temp (24hrs), Av.3 °F (36.8 °C), Min:97.6 °F (36.4 °C), Max:99.1 °F (37.3 °C)    Recent Labs     24  0031 24  1637   POCGLU 102 128* 
Initially in Place: No    Balance  Sitting: Impaired (Static - SBA; Dynamic - CGA)  Standing: Impaired (Static/dynamic - CGA)    Toilet Transfers  Toilet Transfers Comments: denied  AROM: Within functional limits (BUE AROM WFL)  Strength: Within functional limits (BUE MMT WFL)  Coordination: Within functional limits  Sensation: Intact (Pt reports normal sensation grossly)    ADL  Feeding: Independent  Grooming: Stand by assistance  UE Bathing: Modified independent   LE Bathing: Minimal assistance  UE Dressing: Stand by assistance  LE Dressing: Contact guard assistance  LE Dressing Skilled Clinical Factors: CGA seated EOB for doffing/donning socks; increased time/effort to complete with rest breaks between socks due to SOB  Toileting: Contact guard assistance  Functional Mobility: Contact guard assistance  Functional Mobility Skilled Clinical Factors: CGA functional mobility in the room with and without use of RW; no LOB noted however pt more stable with RW at this time    Bed mobility  Supine to Sit: Contact guard assistance  Sit to Supine: Unable to assess  Scooting: Stand by assistance  Bed Mobility Comments: CGA supine>sitting EOB with HOB slightly elevated; increased time/effort to complete with use of bed rail; increased SOB; sat EOB for ~3 mins at SUP-SBA with no LOB; retired to recliner following eval    Transfers  Sit to stand: Contact guard assistance  Stand to sit: Contact guard assistance  Transfer Comments: CGA sit<>stand with and without use of RW; vc's for hand placement and technique    Vision  Vision: Impaired  Vision Exceptions: Wears glasses for reading  Hearing  Hearing: Exceptions to WFL  Hearing Exceptions: Hard of hearing/hearing concerns    Cognition  Overall Cognitive Status: WFL  Orientation  Overall Orientation Status: Within Normal Limits  Orientation Level: Oriented X4    Education Given To: Patient  Education Provided: Role of Therapy;Transfer Training;Equipment;Plan of Care  Education 
Wears glasses for reading  Hearing  Hearing: Exceptions to WFL  Hearing Exceptions: Hard of hearing/hearing concerns    Cognition   Orientation  Overall Orientation Status: Within Normal Limits  Orientation Level: Oriented X4     Objective           Gross Assessment  AROM: Within functional limits  PROM: Within functional limits  Strength: Within functional limits  Coordination: Within functional limits  Tone: Normal  Sensation: Intact     AROM RLE (degrees)  RLE AROM: WFL  AROM LLE (degrees)  LLE AROM : WFL  Strength RLE  Strength RLE: WFL  Strength LLE  Strength LLE: WFL        Balance  Sitting: Impaired (Static - SBA; Dynamic - CGA)  Standing: Impaired (Static/dynamic - CGA)  Bed mobility  Supine to Sit: Contact guard assistance  Sit to Supine: Unable to assess  Scooting: Stand by assistance  Bed Mobility Comments: CGA supine>sitting EOB with HOB slightly elevated; increased time/effort to complete with use of bed rail; increased SOB; sat EOB for ~3 mins at SUP-SBA with no LOB; retired to recliner following eval  Transfers  Sit to Stand: Contact guard assistance;Stand by assistance  Stand to Sit: Stand by assistance  Stand Pivot Transfers: Contact guard assistance;Stand by assistance  Comment: Transfers with RW.  Ambulation  Surface: Level tile  Device: Rolling Walker  Assistance: Contact guard assistance;Stand by assistance  Quality of Gait: slow pace, decreased step length  Gait Deviations: Slow Margie;Decreased step length  Distance: 80'  Comments: Pt did ambulate 30' without device without LOB but increased base of support.  More Ambulation?: No  Stairs/Curb  Stairs?: No     Balance  Posture: Good  Sitting - Static: Good  Sitting - Dynamic: Good  Standing - Static: Fair;+  Standing - Dynamic: Fair  Comments: standing balance assessed with RW           OutComes Score                                                  AM-PAC - Mobility    AM-PAC Basic Mobility - Inpatient   How much help is needed turning from 
by speech recognition. Minor errors in  transcription may be present

## 2024-02-26 LAB
GLUCOSE BLD-MCNC: 105 MG/DL (ref 75–110)
GLUCOSE BLD-MCNC: 108 MG/DL (ref 75–110)
GLUCOSE BLD-MCNC: 116 MG/DL (ref 75–110)
GLUCOSE BLD-MCNC: 151 MG/DL (ref 75–110)
GLUCOSE BLD-MCNC: 154 MG/DL (ref 75–110)
GLUCOSE BLD-MCNC: 162 MG/DL (ref 75–110)
GLUCOSE BLD-MCNC: 164 MG/DL (ref 75–110)
GLUCOSE BLD-MCNC: 202 MG/DL (ref 75–110)

## 2024-02-27 ENCOUNTER — APPOINTMENT (OUTPATIENT)
Dept: GENERAL RADIOLOGY | Age: 79
DRG: 981 | End: 2024-02-27
Payer: COMMERCIAL

## 2024-02-27 ENCOUNTER — APPOINTMENT (OUTPATIENT)
Dept: CT IMAGING | Age: 79
DRG: 981 | End: 2024-02-27
Payer: COMMERCIAL

## 2024-02-27 ENCOUNTER — HOSPITAL ENCOUNTER (INPATIENT)
Age: 79
LOS: 2 days | Discharge: ANOTHER HOSPITAL | DRG: 981 | End: 2024-02-29
Attending: EMERGENCY MEDICINE | Admitting: STUDENT IN AN ORGANIZED HEALTH CARE EDUCATION/TRAINING PROGRAM
Payer: COMMERCIAL

## 2024-02-27 DIAGNOSIS — K62.5 RECTAL BLEED: ICD-10-CM

## 2024-02-27 DIAGNOSIS — K62.5 RECTAL BLEEDING: Primary | ICD-10-CM

## 2024-02-27 PROBLEM — K92.2 GI BLEED: Status: ACTIVE | Noted: 2024-02-27

## 2024-02-27 LAB
ALBUMIN SERPL-MCNC: 3.3 G/DL (ref 3.5–5.2)
ALBUMIN/GLOB SERPL: 0.9 {RATIO} (ref 1–2.5)
ALP SERPL-CCNC: 86 U/L (ref 40–129)
ALT SERPL-CCNC: 21 U/L (ref 5–41)
ANION GAP SERPL CALCULATED.3IONS-SCNC: 9 MMOL/L (ref 9–17)
AST SERPL-CCNC: 21 U/L
BASOPHILS # BLD: 0.01 K/UL (ref 0–0.2)
BASOPHILS # BLD: 0.1 K/UL (ref 0–0.2)
BASOPHILS NFR BLD: 0 % (ref 0–2)
BASOPHILS NFR BLD: 1 % (ref 0–2)
BILIRUB SERPL-MCNC: 0.7 MG/DL (ref 0.3–1.2)
BUN SERPL-MCNC: 25 MG/DL (ref 8–23)
CALCIUM SERPL-MCNC: 8.7 MG/DL (ref 8.6–10.4)
CHLORIDE SERPL-SCNC: 104 MMOL/L (ref 98–107)
CO2 SERPL-SCNC: 27 MMOL/L (ref 20–31)
CREAT SERPL-MCNC: 1.7 MG/DL (ref 0.7–1.2)
EOSINOPHIL # BLD: 0.1 K/UL (ref 0–0.4)
EOSINOPHIL # BLD: 0.12 K/UL (ref 0–0.4)
EOSINOPHILS RELATIVE PERCENT: 2 % (ref 1–4)
EOSINOPHILS RELATIVE PERCENT: 2 % (ref 1–4)
ERYTHROCYTE [DISTWIDTH] IN BLOOD BY AUTOMATED COUNT: 18.2 % (ref 12.5–15.4)
ERYTHROCYTE [DISTWIDTH] IN BLOOD BY AUTOMATED COUNT: 18.6 % (ref 12.5–15.4)
GFR SERPL CREATININE-BSD FRML MDRD: 41 ML/MIN/1.73M2
GLUCOSE BLD-MCNC: 158 MG/DL (ref 75–110)
GLUCOSE SERPL-MCNC: 172 MG/DL (ref 70–99)
HCT VFR BLD AUTO: 34.9 % (ref 41–53)
HCT VFR BLD AUTO: 49.4 % (ref 41–53)
HGB BLD-MCNC: 11.3 G/DL (ref 13.5–17.5)
HGB BLD-MCNC: 15.3 G/DL (ref 13.5–17.5)
LIPASE SERPL-CCNC: 54 U/L (ref 13–60)
LYMPHOCYTES NFR BLD: 1.95 K/UL (ref 1–4.8)
LYMPHOCYTES NFR BLD: 2.4 K/UL (ref 1–4.8)
LYMPHOCYTES RELATIVE PERCENT: 31 % (ref 24–44)
LYMPHOCYTES RELATIVE PERCENT: 36 % (ref 24–44)
MCH RBC QN AUTO: 28.8 PG (ref 26–34)
MCH RBC QN AUTO: 29.3 PG (ref 26–34)
MCHC RBC AUTO-ENTMCNC: 31 G/DL (ref 31–37)
MCHC RBC AUTO-ENTMCNC: 32.4 G/DL (ref 31–37)
MCV RBC AUTO: 89 FL (ref 80–100)
MCV RBC AUTO: 94.5 FL (ref 80–100)
MICROORGANISM SPEC CULT: NORMAL
MICROORGANISM SPEC CULT: NORMAL
MONOCYTES NFR BLD: 0.49 K/UL (ref 0.1–1.2)
MONOCYTES NFR BLD: 0.5 K/UL (ref 0.1–1.2)
MONOCYTES NFR BLD: 7 % (ref 2–11)
MONOCYTES NFR BLD: 8 % (ref 2–11)
NEUTROPHILS NFR BLD: 54 % (ref 36–66)
NEUTROPHILS NFR BLD: 59 % (ref 36–66)
NEUTS SEG NFR BLD: 3.6 K/UL (ref 1.8–7.7)
NEUTS SEG NFR BLD: 3.82 K/UL (ref 1.8–7.7)
PLATELET # BLD AUTO: 162 K/UL (ref 140–450)
PLATELET # BLD AUTO: 164 K/UL (ref 140–450)
PMV BLD AUTO: 10.1 FL (ref 8–14)
PMV BLD AUTO: 7.8 FL (ref 6–12)
POTASSIUM SERPL-SCNC: 4.7 MMOL/L (ref 3.7–5.3)
PROT SERPL-MCNC: 7 G/DL (ref 6.4–8.3)
RBC # BLD AUTO: 3.93 M/UL (ref 4.5–5.9)
RBC # BLD AUTO: 5.23 M/UL (ref 4.5–5.9)
SERVICE CMNT-IMP: NORMAL
SERVICE CMNT-IMP: NORMAL
SODIUM SERPL-SCNC: 140 MMOL/L (ref 135–144)
SPECIMEN DESCRIPTION: NORMAL
SPECIMEN DESCRIPTION: NORMAL
TROPONIN I SERPL HS-MCNC: 67 NG/L (ref 0–22)
WBC OTHER # BLD: 6.4 K/UL (ref 3.5–11)
WBC OTHER # BLD: 6.6 K/UL (ref 3.5–11)

## 2024-02-27 PROCEDURE — 83690 ASSAY OF LIPASE: CPT

## 2024-02-27 PROCEDURE — 80053 COMPREHEN METABOLIC PANEL: CPT

## 2024-02-27 PROCEDURE — 2580000003 HC RX 258

## 2024-02-27 PROCEDURE — 86900 BLOOD TYPING SEROLOGIC ABO: CPT

## 2024-02-27 PROCEDURE — 36415 COLL VENOUS BLD VENIPUNCTURE: CPT

## 2024-02-27 PROCEDURE — 74176 CT ABD & PELVIS W/O CONTRAST: CPT

## 2024-02-27 PROCEDURE — 2700000000 HC OXYGEN THERAPY PER DAY

## 2024-02-27 PROCEDURE — 93005 ELECTROCARDIOGRAM TRACING: CPT

## 2024-02-27 PROCEDURE — 99285 EMERGENCY DEPT VISIT HI MDM: CPT

## 2024-02-27 PROCEDURE — 2580000003 HC RX 258: Performed by: NURSE PRACTITIONER

## 2024-02-27 PROCEDURE — 94761 N-INVAS EAR/PLS OXIMETRY MLT: CPT

## 2024-02-27 PROCEDURE — 82947 ASSAY GLUCOSE BLOOD QUANT: CPT

## 2024-02-27 PROCEDURE — 71045 X-RAY EXAM CHEST 1 VIEW: CPT

## 2024-02-27 PROCEDURE — 86850 RBC ANTIBODY SCREEN: CPT

## 2024-02-27 PROCEDURE — 86901 BLOOD TYPING SEROLOGIC RH(D): CPT

## 2024-02-27 PROCEDURE — 85025 COMPLETE CBC W/AUTO DIFF WBC: CPT

## 2024-02-27 PROCEDURE — 2060000000 HC ICU INTERMEDIATE R&B

## 2024-02-27 PROCEDURE — 84484 ASSAY OF TROPONIN QUANT: CPT

## 2024-02-27 PROCEDURE — 96361 HYDRATE IV INFUSION ADD-ON: CPT

## 2024-02-27 PROCEDURE — 96360 HYDRATION IV INFUSION INIT: CPT

## 2024-02-27 PROCEDURE — 86920 COMPATIBILITY TEST SPIN: CPT

## 2024-02-27 RX ORDER — SODIUM CHLORIDE 0.9 % (FLUSH) 0.9 %
5-40 SYRINGE (ML) INJECTION PRN
Status: DISCONTINUED | OUTPATIENT
Start: 2024-02-27 | End: 2024-02-29 | Stop reason: HOSPADM

## 2024-02-27 RX ORDER — GLUCAGON 1 MG/ML
1 KIT INJECTION PRN
Status: DISCONTINUED | OUTPATIENT
Start: 2024-02-27 | End: 2024-02-29 | Stop reason: HOSPADM

## 2024-02-27 RX ORDER — INSULIN LISPRO 100 [IU]/ML
0-8 INJECTION, SOLUTION INTRAVENOUS; SUBCUTANEOUS
Status: DISCONTINUED | OUTPATIENT
Start: 2024-02-28 | End: 2024-02-29 | Stop reason: HOSPADM

## 2024-02-27 RX ORDER — DEXTROSE MONOHYDRATE 100 MG/ML
INJECTION, SOLUTION INTRAVENOUS CONTINUOUS PRN
Status: DISCONTINUED | OUTPATIENT
Start: 2024-02-27 | End: 2024-02-29 | Stop reason: HOSPADM

## 2024-02-27 RX ORDER — INSULIN GLARGINE 100 [IU]/ML
40 INJECTION, SOLUTION SUBCUTANEOUS
Status: DISCONTINUED | OUTPATIENT
Start: 2024-02-28 | End: 2024-02-29 | Stop reason: HOSPADM

## 2024-02-27 RX ORDER — ACETAMINOPHEN 650 MG/1
650 SUPPOSITORY RECTAL EVERY 6 HOURS PRN
Status: DISCONTINUED | OUTPATIENT
Start: 2024-02-27 | End: 2024-02-29 | Stop reason: HOSPADM

## 2024-02-27 RX ORDER — INSULIN LISPRO 100 [IU]/ML
0-4 INJECTION, SOLUTION INTRAVENOUS; SUBCUTANEOUS NIGHTLY
Status: DISCONTINUED | OUTPATIENT
Start: 2024-02-27 | End: 2024-02-29 | Stop reason: HOSPADM

## 2024-02-27 RX ORDER — ONDANSETRON 2 MG/ML
4 INJECTION INTRAMUSCULAR; INTRAVENOUS EVERY 6 HOURS PRN
Status: DISCONTINUED | OUTPATIENT
Start: 2024-02-27 | End: 2024-02-29 | Stop reason: HOSPADM

## 2024-02-27 RX ORDER — SODIUM CHLORIDE 0.9 % (FLUSH) 0.9 %
5-40 SYRINGE (ML) INJECTION EVERY 12 HOURS SCHEDULED
Status: DISCONTINUED | OUTPATIENT
Start: 2024-02-27 | End: 2024-02-29 | Stop reason: HOSPADM

## 2024-02-27 RX ORDER — ACETAMINOPHEN 325 MG/1
650 TABLET ORAL EVERY 6 HOURS PRN
Status: DISCONTINUED | OUTPATIENT
Start: 2024-02-27 | End: 2024-02-29 | Stop reason: HOSPADM

## 2024-02-27 RX ORDER — SODIUM CHLORIDE 9 MG/ML
INJECTION, SOLUTION INTRAVENOUS CONTINUOUS
Status: DISCONTINUED | OUTPATIENT
Start: 2024-02-27 | End: 2024-02-29 | Stop reason: HOSPADM

## 2024-02-27 RX ORDER — SODIUM CHLORIDE 9 MG/ML
INJECTION, SOLUTION INTRAVENOUS CONTINUOUS
Status: DISCONTINUED | OUTPATIENT
Start: 2024-02-27 | End: 2024-02-28

## 2024-02-27 RX ORDER — SODIUM CHLORIDE 9 MG/ML
INJECTION, SOLUTION INTRAVENOUS PRN
Status: DISCONTINUED | OUTPATIENT
Start: 2024-02-27 | End: 2024-02-29 | Stop reason: HOSPADM

## 2024-02-27 RX ORDER — ONDANSETRON 4 MG/1
4 TABLET, ORALLY DISINTEGRATING ORAL EVERY 8 HOURS PRN
Status: DISCONTINUED | OUTPATIENT
Start: 2024-02-27 | End: 2024-02-29 | Stop reason: HOSPADM

## 2024-02-27 RX ADMIN — SODIUM CHLORIDE: 9 INJECTION, SOLUTION INTRAVENOUS at 23:40

## 2024-02-27 RX ADMIN — SODIUM CHLORIDE: 9 INJECTION, SOLUTION INTRAVENOUS at 17:23

## 2024-02-27 RX ADMIN — SODIUM CHLORIDE, PRESERVATIVE FREE 10 ML: 5 INJECTION INTRAVENOUS at 23:44

## 2024-02-27 ASSESSMENT — ENCOUNTER SYMPTOMS
CONSTIPATION: 0
ABDOMINAL PAIN: 1
BACK PAIN: 0
COUGH: 0
VOMITING: 0
SHORTNESS OF BREATH: 1
COLOR CHANGE: 0
ANAL BLEEDING: 1
NAUSEA: 0
DIARRHEA: 0

## 2024-02-27 ASSESSMENT — LIFESTYLE VARIABLES: HOW MANY STANDARD DRINKS CONTAINING ALCOHOL DO YOU HAVE ON A TYPICAL DAY: PATIENT DOES NOT DRINK

## 2024-02-27 ASSESSMENT — PAIN - FUNCTIONAL ASSESSMENT: PAIN_FUNCTIONAL_ASSESSMENT: NONE - DENIES PAIN

## 2024-02-27 NOTE — DISCHARGE SUMMARY
0.25 MCG capsule  Commonly known as: ROCALTROL     dapagliflozin 10 MG tablet  Commonly known as: FARXIGA     Eliquis 5 MG Tabs tablet  Generic drug: apixaban     famotidine 40 MG tablet  Commonly known as: PEPCID     insulin glargine 100 UNIT/ML injection pen  Commonly known as: LANTUS;BASAGLAR     losartan 25 MG tablet  Commonly known as: COZAAR     mirtazapine 7.5 MG tablet  Commonly known as: REMERON     Ozempic (0.25 or 0.5 MG/DOSE) 2 MG/3ML Sopn  Generic drug: Semaglutide(0.25 or 0.5MG/DOS)     torsemide 20 MG tablet  Commonly known as: DEMADEX               Where to Get Your Medications        These medications were sent to [facility] [id number] - [place], [place] - [address] - P [phone] - F [phone]  [address] [place] [address]      Phone: [phone]   aspirin 81 MG chewable tablet  atorvastatin 80 MG tablet  carvedilol 3.125 MG tablet         Discharge Procedure Orders   DME Order for Home Oxygen as OP   Order Comments: You must complete the order parameters below and add the medical necessity documentation for this DME in a separate note.    Stationary Oxygen Concentrator at 2 lpm via Nasal Cannula    Stationary Prescribed at:  Continuous    Portable Gaseous O2 System and contents at 2 lpm via Nasal Cannula    Non Applicable    Diagnosis: CHF  Length of need: 3 Months       Time Spent on discharge is  37 mins in patient examination, evaluation, counseling as well as medication reconciliation, prescriptions for required medications, discharge plan and follow up.    Electronically signed by   Marlena Hedrick MD  2/27/2024  6:08 PM      Thank you Viridiana Nixon for the opportunity to be involved in this patient's care.

## 2024-02-28 ENCOUNTER — ANESTHESIA (OUTPATIENT)
Dept: OPERATING ROOM | Age: 79
DRG: 377 | End: 2024-02-28
Payer: COMMERCIAL

## 2024-02-28 ENCOUNTER — ANESTHESIA EVENT (OUTPATIENT)
Dept: OPERATING ROOM | Age: 79
DRG: 377 | End: 2024-02-28
Payer: COMMERCIAL

## 2024-02-28 PROBLEM — R10.30 LOWER ABDOMINAL PAIN: Status: ACTIVE | Noted: 2024-02-28

## 2024-02-28 PROBLEM — K62.5 RECTAL BLEEDING: Status: ACTIVE | Noted: 2024-02-28

## 2024-02-28 PROBLEM — I73.9 PAD (PERIPHERAL ARTERY DISEASE) (HCC): Status: ACTIVE | Noted: 2023-07-07

## 2024-02-28 PROBLEM — E11.40 DIABETIC NEUROPATHY (HCC): Status: ACTIVE | Noted: 2023-06-05

## 2024-02-28 PROBLEM — D64.9 ANEMIA: Status: ACTIVE | Noted: 2024-02-28

## 2024-02-28 PROBLEM — K57.92 DIVERTICULITIS: Status: ACTIVE | Noted: 2024-02-28

## 2024-02-28 PROBLEM — N18.30 STAGE 3 CHRONIC KIDNEY DISEASE (HCC): Status: ACTIVE | Noted: 2023-06-05

## 2024-02-28 PROBLEM — Z95.1 HX OF CABG: Status: ACTIVE | Noted: 2024-02-28

## 2024-02-28 LAB
EKG ATRIAL RATE: 71 BPM
EKG P AXIS: -3 DEGREES
EKG P-R INTERVAL: 238 MS
EKG Q-T INTERVAL: 462 MS
EKG QRS DURATION: 144 MS
EKG QTC CALCULATION (BAZETT): 502 MS
EKG R AXIS: -40 DEGREES
EKG T AXIS: 106 DEGREES
EKG VENTRICULAR RATE: 71 BPM
EST. AVERAGE GLUCOSE BLD GHB EST-MCNC: 197 MG/DL
GLUCOSE BLD-MCNC: 155 MG/DL (ref 75–110)
GLUCOSE BLD-MCNC: 170 MG/DL (ref 75–110)
GLUCOSE BLD-MCNC: 179 MG/DL (ref 75–110)
GLUCOSE BLD-MCNC: 186 MG/DL (ref 75–110)
HBA1C MFR BLD: 8.5 % (ref 4–6)
HCT VFR BLD AUTO: 29.7 % (ref 41–53)
HCT VFR BLD AUTO: 30.4 % (ref 41–53)
HCT VFR BLD AUTO: 32.6 % (ref 41–53)
HGB BLD-MCNC: 10 G/DL (ref 13.5–17.5)
HGB BLD-MCNC: 10.5 G/DL (ref 13.5–17.5)
HGB BLD-MCNC: 9.6 G/DL (ref 13.5–17.5)
INR PPP: 1.1
IRON SATN MFR SERPL: 26 % (ref 20–55)
IRON SERPL-MCNC: 47 UG/DL (ref 59–158)
PARTIAL THROMBOPLASTIN TIME: 24.7 SEC (ref 21.3–31.3)
PROTHROMBIN TIME: 11.2 SEC (ref 9.4–12.6)
TIBC SERPL-MCNC: 183 UG/DL (ref 250–450)
UNSATURATED IRON BINDING CAPACITY: 136 UG/DL (ref 112–347)

## 2024-02-28 PROCEDURE — A4216 STERILE WATER/SALINE, 10 ML: HCPCS | Performed by: NURSE PRACTITIONER

## 2024-02-28 PROCEDURE — 85018 HEMOGLOBIN: CPT

## 2024-02-28 PROCEDURE — 2060000000 HC ICU INTERMEDIATE R&B

## 2024-02-28 PROCEDURE — 3700000000 HC ANESTHESIA ATTENDED CARE: Performed by: INTERNAL MEDICINE

## 2024-02-28 PROCEDURE — 83036 HEMOGLOBIN GLYCOSYLATED A1C: CPT

## 2024-02-28 PROCEDURE — 83540 ASSAY OF IRON: CPT

## 2024-02-28 PROCEDURE — 6360000002 HC RX W HCPCS: Performed by: REGISTERED NURSE

## 2024-02-28 PROCEDURE — A4216 STERILE WATER/SALINE, 10 ML: HCPCS | Performed by: STUDENT IN AN ORGANIZED HEALTH CARE EDUCATION/TRAINING PROGRAM

## 2024-02-28 PROCEDURE — 88305 TISSUE EXAM BY PATHOLOGIST: CPT

## 2024-02-28 PROCEDURE — 30233N1 TRANSFUSION OF NONAUTOLOGOUS RED BLOOD CELLS INTO PERIPHERAL VEIN, PERCUTANEOUS APPROACH: ICD-10-PCS | Performed by: STUDENT IN AN ORGANIZED HEALTH CARE EDUCATION/TRAINING PROGRAM

## 2024-02-28 PROCEDURE — 0DB78ZX EXCISION OF STOMACH, PYLORUS, VIA NATURAL OR ARTIFICIAL OPENING ENDOSCOPIC, DIAGNOSTIC: ICD-10-PCS | Performed by: INTERNAL MEDICINE

## 2024-02-28 PROCEDURE — 6360000002 HC RX W HCPCS: Performed by: NURSE PRACTITIONER

## 2024-02-28 PROCEDURE — 2580000003 HC RX 258: Performed by: NURSE PRACTITIONER

## 2024-02-28 PROCEDURE — 6360000002 HC RX W HCPCS: Performed by: STUDENT IN AN ORGANIZED HEALTH CARE EDUCATION/TRAINING PROGRAM

## 2024-02-28 PROCEDURE — 99223 1ST HOSP IP/OBS HIGH 75: CPT | Performed by: STUDENT IN AN ORGANIZED HEALTH CARE EDUCATION/TRAINING PROGRAM

## 2024-02-28 PROCEDURE — 88342 IMHCHEM/IMCYTCHM 1ST ANTB: CPT

## 2024-02-28 PROCEDURE — 43239 EGD BIOPSY SINGLE/MULTIPLE: CPT | Performed by: INTERNAL MEDICINE

## 2024-02-28 PROCEDURE — 99223 1ST HOSP IP/OBS HIGH 75: CPT | Performed by: INTERNAL MEDICINE

## 2024-02-28 PROCEDURE — P9016 RBC LEUKOCYTES REDUCED: HCPCS

## 2024-02-28 PROCEDURE — 2580000003 HC RX 258: Performed by: STUDENT IN AN ORGANIZED HEALTH CARE EDUCATION/TRAINING PROGRAM

## 2024-02-28 PROCEDURE — 36430 TRANSFUSION BLD/BLD COMPNT: CPT

## 2024-02-28 PROCEDURE — C9113 INJ PANTOPRAZOLE SODIUM, VIA: HCPCS | Performed by: STUDENT IN AN ORGANIZED HEALTH CARE EDUCATION/TRAINING PROGRAM

## 2024-02-28 PROCEDURE — 7100000000 HC PACU RECOVERY - FIRST 15 MIN: Performed by: INTERNAL MEDICINE

## 2024-02-28 PROCEDURE — 2709999900 HC NON-CHARGEABLE SUPPLY: Performed by: INTERNAL MEDICINE

## 2024-02-28 PROCEDURE — 6370000000 HC RX 637 (ALT 250 FOR IP): Performed by: NURSE PRACTITIONER

## 2024-02-28 PROCEDURE — 85730 THROMBOPLASTIN TIME PARTIAL: CPT

## 2024-02-28 PROCEDURE — 83550 IRON BINDING TEST: CPT

## 2024-02-28 PROCEDURE — 82947 ASSAY GLUCOSE BLOOD QUANT: CPT

## 2024-02-28 PROCEDURE — APPNB30 APP NON BILLABLE TIME 0-30 MINS: Performed by: NURSE PRACTITIONER

## 2024-02-28 PROCEDURE — 3609012400 HC EGD TRANSORAL BIOPSY SINGLE/MULTIPLE: Performed by: INTERNAL MEDICINE

## 2024-02-28 PROCEDURE — 85014 HEMATOCRIT: CPT

## 2024-02-28 PROCEDURE — 3700000001 HC ADD 15 MINUTES (ANESTHESIA): Performed by: INTERNAL MEDICINE

## 2024-02-28 PROCEDURE — 85610 PROTHROMBIN TIME: CPT

## 2024-02-28 PROCEDURE — 6370000000 HC RX 637 (ALT 250 FOR IP): Performed by: STUDENT IN AN ORGANIZED HEALTH CARE EDUCATION/TRAINING PROGRAM

## 2024-02-28 PROCEDURE — P9040 RBC LEUKOREDUCED IRRADIATED: HCPCS

## 2024-02-28 PROCEDURE — 36415 COLL VENOUS BLD VENIPUNCTURE: CPT

## 2024-02-28 PROCEDURE — 2500000003 HC RX 250 WO HCPCS: Performed by: REGISTERED NURSE

## 2024-02-28 PROCEDURE — 7100000001 HC PACU RECOVERY - ADDTL 15 MIN: Performed by: INTERNAL MEDICINE

## 2024-02-28 PROCEDURE — 2580000003 HC RX 258: Performed by: REGISTERED NURSE

## 2024-02-28 PROCEDURE — C9113 INJ PANTOPRAZOLE SODIUM, VIA: HCPCS | Performed by: NURSE PRACTITIONER

## 2024-02-28 RX ORDER — SODIUM CHLORIDE 9 MG/ML
INJECTION, SOLUTION INTRAVENOUS PRN
Status: DISCONTINUED | OUTPATIENT
Start: 2024-02-28 | End: 2024-02-29 | Stop reason: HOSPADM

## 2024-02-28 RX ORDER — TORSEMIDE 20 MG/1
20 TABLET ORAL DAILY
Status: DISCONTINUED | OUTPATIENT
Start: 2024-02-28 | End: 2024-02-29 | Stop reason: HOSPADM

## 2024-02-28 RX ORDER — PHENYLEPHRINE HCL IN 0.9% NACL 1 MG/10 ML
SYRINGE (ML) INTRAVENOUS PRN
Status: DISCONTINUED | OUTPATIENT
Start: 2024-02-28 | End: 2024-02-28 | Stop reason: SDUPTHER

## 2024-02-28 RX ORDER — OXYCODONE HYDROCHLORIDE 5 MG/1
10 TABLET ORAL PRN
Status: ACTIVE | OUTPATIENT
Start: 2024-02-28 | End: 2024-02-28

## 2024-02-28 RX ORDER — ONDANSETRON 2 MG/ML
4 INJECTION INTRAMUSCULAR; INTRAVENOUS
Status: DISCONTINUED | OUTPATIENT
Start: 2024-02-28 | End: 2024-02-29

## 2024-02-28 RX ORDER — SODIUM CHLORIDE 0.9 % (FLUSH) 0.9 %
5-40 SYRINGE (ML) INJECTION PRN
Status: DISCONTINUED | OUTPATIENT
Start: 2024-02-28 | End: 2024-02-29 | Stop reason: HOSPADM

## 2024-02-28 RX ORDER — INSULIN GLARGINE 100 [IU]/ML
10 INJECTION, SOLUTION SUBCUTANEOUS
Status: COMPLETED | OUTPATIENT
Start: 2024-02-28 | End: 2024-02-28

## 2024-02-28 RX ORDER — PROPOFOL 10 MG/ML
INJECTION, EMULSION INTRAVENOUS PRN
Status: DISCONTINUED | OUTPATIENT
Start: 2024-02-28 | End: 2024-02-28 | Stop reason: SDUPTHER

## 2024-02-28 RX ORDER — LABETALOL HYDROCHLORIDE 5 MG/ML
10 INJECTION, SOLUTION INTRAVENOUS
Status: DISCONTINUED | OUTPATIENT
Start: 2024-02-28 | End: 2024-02-29 | Stop reason: HOSPADM

## 2024-02-28 RX ORDER — CARVEDILOL 3.12 MG/1
3.12 TABLET ORAL 2 TIMES DAILY WITH MEALS
Status: DISCONTINUED | OUTPATIENT
Start: 2024-02-28 | End: 2024-02-29 | Stop reason: HOSPADM

## 2024-02-28 RX ORDER — SODIUM CHLORIDE 9 MG/ML
INJECTION, SOLUTION INTRAVENOUS CONTINUOUS PRN
Status: DISCONTINUED | OUTPATIENT
Start: 2024-02-28 | End: 2024-02-28 | Stop reason: SDUPTHER

## 2024-02-28 RX ORDER — BISACODYL 5 MG/1
20 TABLET, DELAYED RELEASE ORAL ONCE
Status: COMPLETED | OUTPATIENT
Start: 2024-02-28 | End: 2024-02-28

## 2024-02-28 RX ORDER — LOSARTAN POTASSIUM 25 MG/1
25 TABLET ORAL DAILY
Status: DISCONTINUED | OUTPATIENT
Start: 2024-02-28 | End: 2024-02-29 | Stop reason: HOSPADM

## 2024-02-28 RX ORDER — METRONIDAZOLE 500 MG/100ML
500 INJECTION, SOLUTION INTRAVENOUS EVERY 8 HOURS
Status: DISCONTINUED | OUTPATIENT
Start: 2024-02-28 | End: 2024-02-29 | Stop reason: HOSPADM

## 2024-02-28 RX ORDER — CIPROFLOXACIN 2 MG/ML
400 INJECTION, SOLUTION INTRAVENOUS EVERY 12 HOURS SCHEDULED
Status: DISCONTINUED | OUTPATIENT
Start: 2024-02-28 | End: 2024-02-29 | Stop reason: HOSPADM

## 2024-02-28 RX ORDER — HYDRALAZINE HYDROCHLORIDE 20 MG/ML
10 INJECTION INTRAMUSCULAR; INTRAVENOUS
Status: DISCONTINUED | OUTPATIENT
Start: 2024-02-28 | End: 2024-02-29 | Stop reason: HOSPADM

## 2024-02-28 RX ORDER — LIDOCAINE HYDROCHLORIDE 10 MG/ML
INJECTION, SOLUTION INFILTRATION; PERINEURAL PRN
Status: DISCONTINUED | OUTPATIENT
Start: 2024-02-28 | End: 2024-02-28 | Stop reason: SDUPTHER

## 2024-02-28 RX ORDER — OXYCODONE HYDROCHLORIDE 5 MG/1
5 TABLET ORAL PRN
Status: ACTIVE | OUTPATIENT
Start: 2024-02-28 | End: 2024-02-28

## 2024-02-28 RX ORDER — METOCLOPRAMIDE HYDROCHLORIDE 5 MG/ML
10 INJECTION INTRAMUSCULAR; INTRAVENOUS
Status: DISCONTINUED | OUTPATIENT
Start: 2024-02-28 | End: 2024-02-29

## 2024-02-28 RX ORDER — DIPHENHYDRAMINE HYDROCHLORIDE 50 MG/ML
12.5 INJECTION INTRAMUSCULAR; INTRAVENOUS
Status: DISCONTINUED | OUTPATIENT
Start: 2024-02-28 | End: 2024-02-29

## 2024-02-28 RX ORDER — SODIUM CHLORIDE 0.9 % (FLUSH) 0.9 %
5-40 SYRINGE (ML) INJECTION EVERY 12 HOURS SCHEDULED
Status: DISCONTINUED | OUTPATIENT
Start: 2024-02-28 | End: 2024-02-29 | Stop reason: HOSPADM

## 2024-02-28 RX ORDER — 0.9 % SODIUM CHLORIDE 0.9 %
250 INTRAVENOUS SOLUTION INTRAVENOUS ONCE
Status: COMPLETED | OUTPATIENT
Start: 2024-02-28 | End: 2024-02-28

## 2024-02-28 RX ORDER — MORPHINE SULFATE 2 MG/ML
1 INJECTION, SOLUTION INTRAMUSCULAR; INTRAVENOUS EVERY 5 MIN PRN
Status: DISCONTINUED | OUTPATIENT
Start: 2024-02-28 | End: 2024-02-29 | Stop reason: HOSPADM

## 2024-02-28 RX ORDER — MIDAZOLAM HYDROCHLORIDE 2 MG/2ML
2 INJECTION, SOLUTION INTRAMUSCULAR; INTRAVENOUS
Status: DISCONTINUED | OUTPATIENT
Start: 2024-02-28 | End: 2024-02-29

## 2024-02-28 RX ADMIN — METRONIDAZOLE 500 MG: 500 INJECTION, SOLUTION INTRAVENOUS at 13:46

## 2024-02-28 RX ADMIN — CIPROFLOXACIN 400 MG: 400 INJECTION, SOLUTION INTRAVENOUS at 20:48

## 2024-02-28 RX ADMIN — SODIUM CHLORIDE 250 ML: 9 INJECTION, SOLUTION INTRAVENOUS at 04:11

## 2024-02-28 RX ADMIN — PROPOFOL 40 MG: 10 INJECTION, EMULSION INTRAVENOUS at 12:00

## 2024-02-28 RX ADMIN — INSULIN GLARGINE 10 UNITS: 100 INJECTION, SOLUTION SUBCUTANEOUS at 09:43

## 2024-02-28 RX ADMIN — PANTOPRAZOLE SODIUM 8 MG/HR: 40 INJECTION, POWDER, FOR SOLUTION INTRAVENOUS at 14:19

## 2024-02-28 RX ADMIN — BISACODYL 20 MG: 5 TABLET, COATED ORAL at 16:11

## 2024-02-28 RX ADMIN — SODIUM CHLORIDE, PRESERVATIVE FREE 10 ML: 5 INJECTION INTRAVENOUS at 20:49

## 2024-02-28 RX ADMIN — SODIUM CHLORIDE, PRESERVATIVE FREE 10 ML: 5 INJECTION INTRAVENOUS at 09:43

## 2024-02-28 RX ADMIN — PANTOPRAZOLE SODIUM 40 MG: 40 INJECTION, POWDER, FOR SOLUTION INTRAVENOUS at 17:43

## 2024-02-28 RX ADMIN — CARVEDILOL 3.12 MG: 3.12 TABLET, FILM COATED ORAL at 17:55

## 2024-02-28 RX ADMIN — SODIUM CHLORIDE: 9 INJECTION, SOLUTION INTRAVENOUS at 11:53

## 2024-02-28 RX ADMIN — SODIUM CHLORIDE: 9 INJECTION, SOLUTION INTRAVENOUS at 06:24

## 2024-02-28 RX ADMIN — POLYETHYLENE GLYCOL 3350, SODIUM SULFATE ANHYDROUS, SODIUM BICARBONATE, SODIUM CHLORIDE, POTASSIUM CHLORIDE 4000 ML: 236; 22.74; 6.74; 5.86; 2.97 POWDER, FOR SOLUTION ORAL at 16:11

## 2024-02-28 RX ADMIN — Medication 100 MCG: at 12:00

## 2024-02-28 RX ADMIN — PANTOPRAZOLE SODIUM 80 MG: 40 INJECTION, POWDER, FOR SOLUTION INTRAVENOUS at 09:43

## 2024-02-28 RX ADMIN — Medication 200 MCG: at 12:03

## 2024-02-28 RX ADMIN — LOSARTAN POTASSIUM 25 MG: 25 TABLET, FILM COATED ORAL at 17:55

## 2024-02-28 RX ADMIN — Medication 200 MCG: at 12:02

## 2024-02-28 RX ADMIN — CIPROFLOXACIN 400 MG: 400 INJECTION, SOLUTION INTRAVENOUS at 09:55

## 2024-02-28 RX ADMIN — METRONIDAZOLE 500 MG: 500 INJECTION, SOLUTION INTRAVENOUS at 17:44

## 2024-02-28 RX ADMIN — PROPOFOL 50 MG: 10 INJECTION, EMULSION INTRAVENOUS at 11:59

## 2024-02-28 RX ADMIN — LIDOCAINE HYDROCHLORIDE 100 MG: 10 INJECTION, SOLUTION INFILTRATION; PERINEURAL at 11:59

## 2024-02-28 ASSESSMENT — PAIN SCALES - GENERAL: PAINLEVEL_OUTOF10: 0

## 2024-02-28 ASSESSMENT — PAIN - FUNCTIONAL ASSESSMENT: PAIN_FUNCTIONAL_ASSESSMENT: 0-10

## 2024-02-28 NOTE — H&P
Protime 11.2 9.4 - 12.6 sec    INR 1.1    APTT    Collection Time: 02/28/24  3:45 AM   Result Value Ref Range    APTT 24.7 21.3 - 31.3 sec   Iron and TIBC    Collection Time: 02/28/24  3:45 AM   Result Value Ref Range    Iron 47 (L) 59 - 158 ug/dL    TIBC 183 (L) 250 - 450 ug/dL    Iron % Saturation 26 20 - 55 %    UIBC 136 112 - 347 ug/dL   Hemoglobin and Hematocrit    Collection Time: 02/28/24  3:45 AM   Result Value Ref Range    Hemoglobin 9.6 (L) 13.5 - 17.5 g/dL    Hematocrit 29.7 (L) 41 - 53 %   Hemoglobin A1C    Collection Time: 02/28/24  3:45 AM   Result Value Ref Range    Hemoglobin A1C 8.5 (H) 4.0 - 6.0 %    Estimated Avg Glucose 197 mg/dL   POC Glucose Fingerstick    Collection Time: 02/28/24  8:09 AM   Result Value Ref Range    POC Glucose 179 (H) 75 - 110 mg/dL   POC Glucose Fingerstick    Collection Time: 02/28/24  8:23 AM   Result Value Ref Range    POC Glucose 186 (H) 75 - 110 mg/dL   Hemoglobin and Hematocrit    Collection Time: 02/28/24  2:49 PM   Result Value Ref Range    Hemoglobin 10.5 (L) 13.5 - 17.5 g/dL    Hematocrit 32.6 (L) 41 - 53 %       Imaging/Diagnostics:  CT ABDOMEN PELVIS WO CONTRAST Additional Contrast? None    Result Date: 2/27/2024  1. Extensive colonic diverticulosis with sigmoid colonic wall thickening. Given the clinical history of rectal bleeding, findings could represent mild diverticulitis with underlying mass not excluded.  Consider correlation with colonoscopy if not recently performed. 2. Additional chronic findings as detailed above.     XR CHEST PORTABLE    Result Date: 2/27/2024  No acute process.     XR CHEST PORTABLE    Result Date: 2/25/2024  No radiographic evidence of acute pulmonary disease. Stable cardiomegaly.     XR CHEST PORTABLE    Result Date: 2/23/2024  1. No acute cardiopulmonary process. 2. Cardiomegaly.       Assessment :      Hospital Problems             Last Modified POA    * (Principal) GI bleed 2/27/2024 Yes    Hypertension 2/28/2024 Yes

## 2024-02-28 NOTE — ANESTHESIA POSTPROCEDURE EVALUATION
Department of Anesthesiology  Postprocedure Note    Patient: Kb Wilson  MRN: [id number]  YOB: 1945  Date of evaluation: 2/28/2024    Procedure Summary       Date: 02/28/24 Room / Location: [facility] PROCEDURE ROOM / [facility]    Anesthesia Start: 1146 Anesthesia Stop: 1214    Procedure: ESOPHAGOGASTRODUODENOSCOPY BIOPSY Diagnosis:       Rectal bleed      (Rectal bleed [K62.5])    Surgeons: Chas Molina MD Responsible Provider: Judith Espinosa MD    Anesthesia Type: MAC ASA Status: 4 - Emergent            Anesthesia Type: No value filed.    Gerald Phase I: Gerald Score: 8    Gerald Phase II:      Anesthesia Post Evaluation    Patient location during evaluation: PACU  Patient participation: complete - patient participated  Level of consciousness: awake and alert  Airway patency: patent  Nausea & Vomiting: no nausea and no vomiting  Cardiovascular status: blood pressure returned to baseline  Respiratory status: acceptable and room air  Hydration status: euvolemic  Pain management: adequate and satisfactory to patient    No notable events documented.

## 2024-02-28 NOTE — CARE COORDINATION
Case Management Assessment  Initial Evaluation    Date/Time of Evaluation: 2/28/2024 9:09 AM  Assessment Completed by: JUANA SANTANA CARO    If patient is discharged prior to next notation, then this note serves as note for discharge by case management.    Patient Name: Kb Wilson                   YOB: 1945  Diagnosis: Rectal bleeding [K62.5]  GI bleed [K92.2]                   Date / Time: 2/27/2024  5:01 PM    Patient Admission Status: Inpatient   Readmission Risk (Low < 19, Mod (19-27), High > 27): Readmission Risk Score: 16.6    Current PCP: Viridiana Taveras  PCP verified by CM? Yes    Chart Reviewed: Yes      History Provided by: Patient  Patient Orientation: Alert and Oriented    Patient Cognition: Alert    Hospitalization in the last 30 days (Readmission):  Yes    If yes, Readmission Assessment in CM Navigator will be completed.    Advance Directives:      Code Status: Full Code   Patient's Primary Decision Maker is: Legal Next of Kin      Discharge Planning:    Patient lives with: Alone Type of Home: House  Primary Care Giver: Self  Patient Support Systems include: Children, Family Members   Current Financial resources: Medicare  Current community resources: None  Current services prior to admission: Durable Medical Equipment, Oxygen Therapy            Current DME: Walker            Type of Home Care services:  PT, OT, Nursing Services    ADLS  Prior functional level: Independent in ADLs/IADLs  Current functional level: Independent in ADLs/IADLs    PT AM-PAC:   /24  OT AM-PAC:   /24    Family can provide assistance at DC: No  Would you like Case Management to discuss the discharge plan with any other family members/significant others, and if so, who? No  Plans to Return to Present Housing: Yes  Other Identified Issues/Barriers to RETURNING to current housing: None  Potential Assistance needed at discharge: Home Care (Current with [id number])            Potential DME:    Patient expects to

## 2024-02-28 NOTE — PROGRESS NOTES
PATIENT CAME TO THE OR DEEMED EMERGECT WITH AICD. NO INTERROGATION HISTORY REPORTED OR ORDERS IF MAGNET WERE TO BE USED, CLARISA BELL (PRE OP SUPERVISOR) MADE AWARE AND A REP WAS CONTACTED. LAST INTERROGATION WAS 8/23/2023 AND NO FURTHER WORKUP NEEDED. NO MAGNET OR CAUTERY NEEDED DURING THE CASE.

## 2024-02-28 NOTE — OP NOTE
EGD report    Esophagogastroduodenoscopy (EGD) Procedure Note    Procedure:  EGD with Biopsy    Procedure Date: 2/28/2024    Indications: GI bleed, acute drop in hemoglobin    Sedation: MAC    Attending Physician:  Dr. Chas Molina MD    Assistant: None    Procedure Details:    Informed consent was obtained for the procedure, including sedation. Risks of infection, perforation, hemorrhage, adverse drug reaction, and aspiration were discussed. The patient was placed in the left lateral decubitus position. The patient was monitored continuously with ECG tracing, pulse oximetry, blood pressure monitoring, and direct observation.      The gastroscope was inserted into the mouth and advanced under direct vision to second portion of the duodenum.  A careful inspection was made as the gastroscope was withdrawn, including a retroflexed view of the proximal stomach; findings and interventions are described below. Appropriate photodocumentation was obtained.    Findings:  Retropharyngeal area was grossly normal appearing     Esophagus: normal                          Esophagogastric markings: Diaphragmatic hiatus-45 cm; GE junction-45 cm     Stomach:    Fundus: normal    Body: normal    Antrum: Multiple linear patchy erythema noted in the body and fundus.  Biopsies obtained to rule out H. pylori     Duodenum:     Bulb: normal    First part: Normal    Second Part: Normal  A large diverticula noted in 2nd-3rd part of the duodenum     Complications:  None           Estimated blood loss: Minimal     Disposition:  [facility]           Condition: Stable    Specimen Removed: Gastric biopsies    Impression:    Erythematous gastropathy, biopsies obtained.  A large diverticula noted in 2nd-3rd part of the duodenum   No active GI bleeding noted throughout the upper endoscopy.    Recommendations:  The likely source of GI bleed is lower.  Will start the patient on a bowel prep with plans for colonoscopy the next day, if the patient

## 2024-02-28 NOTE — ED PROVIDER NOTES
[facility] EMERGENCY DEPARTMENT  EMERGENCY DEPARTMENT ENCOUNTER      Pt Name: Kb Wilson  MRN: [id number]  Birthdate 1945  Date of evaluation: 2/27/2024  Provider: ELIAS Quiñonez CNP  9:31 PM    CHIEF COMPLAINT       Chief Complaint   Patient presents with    Rectal Bleeding     Pt called EMS for c/o bleeding from rectum @ 1 hour PTA.  Noted bright red blood on linen when pt moved from EMS into bathroom. Pt is A/O and non distressful.          HISTORY OF PRESENT ILLNESS    Kb Wilson is a 78 y.o. male who presents to the emergency department with complaints of GI bleed    HPI  This a 78-year-old with complex medical history who was seen last week for chest pain and discharged home.  He is on aspirin and Eliquis and developed GI bleeding which is bright red blood with blood clots per his rectum he has some left lower quadrant abdominal tenderness.  No nausea vomiting no fever.  He has some dizziness and dyspnea worse with exertion.  He is pale.  He came by EMS with IV established  Nursing Notes were reviewed.    REVIEW OF SYSTEMS       Review of Systems   Constitutional:  Positive for fatigue. Negative for chills, diaphoresis and fever.   HENT:  Negative for congestion.    Eyes:  Negative for visual disturbance.   Respiratory:  Positive for shortness of breath. Negative for cough.    Cardiovascular:  Negative for chest pain and leg swelling.   Gastrointestinal:  Positive for abdominal pain and anal bleeding. Negative for constipation, diarrhea, nausea and vomiting.   Genitourinary:  Negative for frequency and urgency.   Musculoskeletal:  Negative for back pain, myalgias and neck stiffness.   Skin:  Negative for color change and rash.   Neurological:  Positive for light-headedness. Negative for weakness, numbness and headaches.       Except as noted above the remainder of the review of systems was reviewed and negative.       PAST MEDICAL HISTORY     Past Medical History: 
22 ng/L   TYPE AND SCREEN   Result Value Ref Range    Blood Bank Sample Expiration 03/01/2024,2359     Arm Band Number [id number]     ABO/Rh A POSITIVE     Antibody Screen NEGATIVE        Not indicated unless otherwise documented above    RADIOLOGY:   I reviewed the radiologist interpretations:    XR CHEST PORTABLE   Final Result   No acute process.         CT ABDOMEN PELVIS WO CONTRAST Additional Contrast? None   Final Result   1. Extensive colonic diverticulosis with sigmoid colonic wall thickening.   Given the clinical history of rectal bleeding, findings could represent mild   diverticulitis with underlying mass not excluded.  Consider correlation with   colonoscopy if not recently performed.   2. Additional chronic findings as detailed above.             Not indicated unless otherwise documented above    EMERGENCY DEPARTMENT COURSE:     The patient was given the following medications:  Orders Placed This Encounter   Medications    0.9 % sodium chloride infusion        Vitals:   -------------------------  /69   Pulse 74   Temp 97.8 °F (36.6 °C) (Oral)   Resp 16   Ht 1.905 m (6' 3\")   Wt 123.4 kg (272 lb)   SpO2 97%   BMI 34.00 kg/m²     Patient is hemodynamically stable.  Hemoglobin is 15.  Hemoglobin will be rechecked in 4 hours.  Patient will be admitted for GI consult.    CRITICAL CARE:    None    PROCEDURES:    See midlevel documentation      OARRS Report if indicated         DISPOSITION Admitted 02/27/2024 10:18:10 PM      (Please note that portions of this note were completed with a voice recognition program.  Efforts were made to edit the dictations but occasionally words are mis-transcribed.  Additionally, portions of this note may also include information that was incorporated after care transfer to another provider that were not available at the time of my evaluation.  Some of this information could likely include laboratory values, vital sign updates, medications etc.)    Tasia

## 2024-02-28 NOTE — CONSULTS
[facility]    GASTROENTEROLOGY CONSULT    Patient:   Kb Wilson   :    1945   Facility:   [facility]  Date:    2024  Admission Dx:  Rectal bleeding [K62.5]  GI bleed [K92.2]  Requesting physician: Mick Cast MD  Reason for consult:  Anemia with rectal bleeding      SUBJECTIVE:  History of Present Illness:  This is a 78 y.o.   male who was admitted 2024 with Rectal bleeding [K62.5]  GI bleed [K92.2]. We have been asked to see the patient in consultation by Mick Cast MD for  anemia with rectal bleeding. 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 Hgb 9.6 he was 11.3 on . He is not taking NSAIDS. Reports bilateral lower abdominal pain that started last night. No fevers chills or emesis, he denies prior gi bleeding. CT abd shows extensive diverticulosis with possible sigmoid colonic wall thickening ?diverticulitis.labs show creat 1.7 no leucocytosis no anemia profile has been done plt normal lft normal . He denies dysphagia weight loss hematemesis dyspepsia rash hematemesis and melena.    ENDOSCOPY HX no egd 23 colonoscopy for positive cologard : The perianal and digital rectal examinations were normal.        There was evidence of a prior end-to-end ileo-colonic anastomosis in the        ascending colon. This was patent and was characterized by healthy        appearing mucosa.        A 2 mm polyp was found in the anastomosis. The polyp was sessile. The        polyp was removed with a cold snare. Resection and retrieval were        complete.        A 4 mm polyp was found in the transverse colon. The polyp was sessile.        The polyp was removed with a cold snare. Resection and retrieval were        complete.        Multiple small and large-mouthed diverticula  nourished, No apparent distress  HEAD:   Normocephalic, Atraumatic  EENT:   EOMI, Sclera not icteric, Oropharynx moist  NECK:   Supple, Trachea midline  LUNGS:  CTA Bilaterally  HEART: 86 RRR, No murmur  ABDOMEN:   Soft, mild bilateral lower tenderness, Nondistended, BS WNL  EXT:   No clubbing.  No cyanosis. No edema.  SKIN:   No rashes.  No jaundice.  No stigmata of liver disease.    MUSC/SKEL:   Adequate muscle bulk for patient's age, No significant synovitis, No deformities  NEURO:  A&O x Three, CN II- XII grossly intact    LABS AND IMAGING:    CBC  Recent Labs     02/27/24  1712 02/27/24  2215 02/28/24  0345   WBC 6.4 6.6  --    HGB 15.3 11.3* 9.6*   HCT 49.4 34.9* 29.7*   MCV 94.5 89.0  --     164  --        BMP  Recent Labs     02/27/24  1712      K 4.7      CO2 27   BUN 25*   CREATININE 1.7*   GLUCOSE 172*   CALCIUM 8.7       LFTS  Recent Labs     02/27/24  1712   ALKPHOS 86   ALT 21   AST 21   PROT 7.0   BILITOT 0.7   LABALBU 3.3*       AMYLASE/LIPASE/AMMONIA  Recent Labs     02/27/24  1712   LIPASE 54       PT/INR  Recent Labs     02/28/24  0345   PROTIME 11.2   INR 1.1   Ct abd 2/27/24  FINDINGS:  Lower Chest: Mild left lower lobe atelectasis/scarring.  Borderline  cardiomegaly.  Extensive coronary artery calcification.  Partially visualized  pacemaker leads and sternotomy changes.     Organs: No significant biliary ductal dilatation status post cholecystectomy.  The solid abdominal organs are unremarkable.     GI/Bowel: A large diverticulum along the 3rd portion of the duodenum measures  5.1 x 4.5 cm but there is no acute inflammation.  No bowel obstruction.  The  appendix has been removed.  Diverticulosis throughout the colon.  Sigmoid  colonic wall thickening without significant fat stranding.     Pelvis: Bladder is unremarkable.  Hysterectomy.  No lymphadenopathy or free  fluid.     Peritoneum/Retroperitoneum: No lymphadenopathy or ascites.  Severe  atherosclerotic disease without

## 2024-02-28 NOTE — PROGRESS NOTES
Blood reached vein at 0650. Writer observed patient for first 15 minutes of infusion and obtained a second set of vital signs. Patient, at this time, showed no signs of reaction.

## 2024-02-28 NOTE — ANESTHESIA PRE PROCEDURE
Department of Anesthesiology  Preprocedure Note       Name:  Kb Wilson   Age:  78 y.o.  :  1945                                          MRN:  [id number]         Date:  2024      Surgeon: Surgeon(s):  Chas Molina MD    Procedure: Procedure(s):  ESOPHAGOGASTRODUODENOSCOPY BIOPSY    Medications prior to admission:   Prior to Admission medications    Medication Sig Start Date End Date Taking? Authorizing Provider   carvedilol (COREG) 3.125 MG tablet Take 1 tablet by mouth 2 times daily (with meals) 24   Marlena Hedrick MD   aspirin 81 MG chewable tablet Take 1 tablet by mouth daily  Patient not taking: Reported on 2024   Marlena Hedrick MD   atorvastatin (LIPITOR) 80 MG tablet Take 1 tablet by mouth nightly 24   Marlena Hedrick MD   calcitRIOL (ROCALTROL) 0.25 MCG capsule Take 1 capsule by mouth as needed Three times weekly 23   Laura More MD   apixaban (ELIQUIS) 5 MG TABS tablet Take 1 tablet by mouth 2 times daily 23   Laura More MD   famotidine (PEPCID) 40 MG tablet Take 1 tablet by mouth daily 23   Laura More MD   OZEMPIC, 0.25 OR 0.5 MG/DOSE, 2 MG/3ML SOPN  1/3/24   Laura More MD   insulin glargine (LANTUS;BASAGLAR) 100 UNIT/ML injection pen Inject 40 Units into the skin daily (with breakfast) 24   Laura More MD   dapagliflozin (FARXIGA) 10 MG tablet Take 1 tablet by mouth daily 23   Laura More MD   losartan (COZAAR) 25 MG tablet Take 1 tablet by mouth daily 23   Laura More MD   mirtazapine (REMERON) 7.5 MG tablet Take 1 tablet by mouth nightly 23   Laura More MD   torsemide (DEMADEX) 20 MG tablet Take 1 tablet by mouth daily 24   Laura More MD       Current medications:    Current Facility-Administered Medications   Medication Dose Route Frequency Provider Last Rate Last Admin    0.9 % sodium chloride infusion   IntraVENous PRN Uvaldo,

## 2024-02-28 NOTE — PLAN OF CARE
Problem: Discharge Planning  Goal: Discharge to home or other facility with appropriate resources  Outcome: Progressing  Flowsheets (Taken 2/27/2024 2345)  Discharge to home or other facility with appropriate resources:   Identify barriers to discharge with patient and caregiver   Arrange for needed discharge resources and transportation as appropriate   Arrange for interpreters to assist at discharge as needed   Identify discharge learning needs (meds, wound care, etc)   Refer to discharge planning if patient needs post-hospital services based on physician order or complex needs related to functional status, cognitive ability or social support system     Problem: Safety - Adult  Goal: Free from fall injury  Outcome: Progressing     Problem: ABCDS Injury Assessment  Goal: Absence of physical injury  Outcome: Progressing  Flowsheets (Taken 2/27/2024 2334)  Absence of Physical Injury: Implement safety measures based on patient assessment

## 2024-02-28 NOTE — PROGRESS NOTES
I reached out to Dr. Sarah Beth Little's office regarding patient AICD.  Office states it was last checked on 8/25/2023 and is in good working condition.  She will fax over paperwork that will be placed in patient's chart.

## 2024-02-28 NOTE — DISCHARGE INSTR - COC
Continuity of Care Form    Patient Name: Kb Wilson   :  1945  MRN:  [id number]    Admit date:  2024  Discharge date:  ***    Code Status Order: Full Code   Advance Directives:     Admitting Physician:  No admitting provider for patient encounter.  PCP: Viridiana Taveras    Discharging Nurse: ***  Discharging Hospital Unit/Room#: 343/343-01  Discharging Unit Phone Number: ***    Emergency Contact:   Extended Emergency Contact Information  Primary Emergency Contact: Jarrett Salgado  Mobile Phone: [phone]  Relation: Other   needed? No    Past Surgical History:  Past Surgical History:   Procedure Laterality Date    CARDIAC DEFIBRILLATOR PLACEMENT      CORONARY ARTERY BYPASS GRAFT         Immunization History:     There is no immunization history on file for this patient.    Active Problems:  Patient Active Problem List   Diagnosis Code    Fall W19.XXXA    Hypertension I10    Diabetes mellitus (HCC) E11.9    CHF (congestive heart failure) ([facility]) I50.9    CAD (coronary artery disease) I25.10    Elevated troponin R79.89    Ischemic cardiomyopathy I25.5    ICD (implantable cardioverter-defibrillator) in place Z95.810    GI bleed K92.2    Rectal bleeding K62.5    Anemia D64.9    Lower abdominal pain R10.30    Diverticulitis K57.92       Isolation/Infection:   Isolation            No Isolation          Patient Infection Status       None to display                     Nurse Assessment:  Last Vital Signs: /64   Pulse 87   Temp 97.5 °F (36.4 °C) (Oral)   Resp 16   Ht 1.905 m (6' 3\")   Wt 123.4 kg (272 lb)   SpO2 98%   BMI 34.00 kg/m²     Last documented pain score (0-10 scale):    Last Weight:   Wt Readings from Last 1 Encounters:   24 123.4 kg (272 lb)     Mental Status:  {IP PT MENTAL STATUS:15264}    IV Access:  { DIGNA IV ACCESS:938237689}    Nursing Mobility/ADLs:  Walking   {P DME ADLs:909265612}  Transfer  {P DME ADLs:768896629}  Bathing  {P DME

## 2024-02-28 NOTE — PLAN OF CARE
I have discussed with the patient the rationale for blood component transfusion; its benefits in treating or preventing fatigue, organ damage, or death; and its risk which includes mild transfusion reactions, rare risk of blood borne infection, or more serious but rare reactions. I have discussed the alternatives to transfusion, including the risk and consequences of not receiving transfusion. The patient had an opportunity to ask questions and had agreed to proceed with transfusion of blood components. Cathy Sosa RN and Tonja Torres RN were present while consent obtained via telephone.     decreased strength/impaired balance

## 2024-02-28 NOTE — ADDENDUM NOTE
Addendum  created 02/28/24 1528 by Fanny Connors APRN - CRNA    Flowsheet accepted, Flowsheet data copied forward, Intraprocedure Event edited, Intraprocedure Flowsheets edited, Intraprocedure Meds edited

## 2024-02-29 ENCOUNTER — ANESTHESIA (OUTPATIENT)
Dept: OPERATING ROOM | Age: 79
DRG: 377 | End: 2024-02-29
Payer: COMMERCIAL

## 2024-02-29 ENCOUNTER — APPOINTMENT (OUTPATIENT)
Dept: GENERAL RADIOLOGY | Age: 79
DRG: 981 | End: 2024-02-29
Payer: COMMERCIAL

## 2024-02-29 ENCOUNTER — ANESTHESIA EVENT (OUTPATIENT)
Dept: OPERATING ROOM | Age: 79
DRG: 377 | End: 2024-02-29
Payer: COMMERCIAL

## 2024-02-29 ENCOUNTER — APPOINTMENT (OUTPATIENT)
Dept: GENERAL RADIOLOGY | Age: 79
DRG: 981 | End: 2024-02-29
Attending: INTERNAL MEDICINE
Payer: COMMERCIAL

## 2024-02-29 ENCOUNTER — HOSPITAL ENCOUNTER (INPATIENT)
Age: 79
LOS: 15 days | Discharge: NURSING, REHAB OR LONG-TERM CARE | DRG: 981 | End: 2024-03-15
Attending: INTERNAL MEDICINE | Admitting: INTERNAL MEDICINE
Payer: COMMERCIAL

## 2024-02-29 VITALS
OXYGEN SATURATION: 88 % | BODY MASS INDEX: 33.72 KG/M2 | DIASTOLIC BLOOD PRESSURE: 59 MMHG | SYSTOLIC BLOOD PRESSURE: 91 MMHG | RESPIRATION RATE: 21 BRPM | HEIGHT: 75 IN | WEIGHT: 271.17 LBS | HEART RATE: 69 BPM | TEMPERATURE: 98.1 F

## 2024-02-29 DIAGNOSIS — D64.9 ACUTE ANEMIA: ICD-10-CM

## 2024-02-29 DIAGNOSIS — N18.30 STAGE 3 CHRONIC KIDNEY DISEASE, UNSPECIFIED WHETHER STAGE 3A OR 3B CKD (HCC): ICD-10-CM

## 2024-02-29 DIAGNOSIS — D62 ACUTE BLOOD LOSS ANEMIA: ICD-10-CM

## 2024-02-29 DIAGNOSIS — Z95.5 S/P PRIMARY ANGIOPLASTY WITH CORONARY STENT: ICD-10-CM

## 2024-02-29 DIAGNOSIS — I25.10 CAD IN NATIVE ARTERY: ICD-10-CM

## 2024-02-29 DIAGNOSIS — I46.9 CARDIAC ARREST (HCC): Primary | ICD-10-CM

## 2024-02-29 DIAGNOSIS — I24.9 ACS (ACUTE CORONARY SYNDROME) (HCC): ICD-10-CM

## 2024-02-29 LAB
ABO/RH: NORMAL
ALBUMIN SERPL-MCNC: 2.5 G/DL (ref 3.5–5.2)
ALBUMIN/GLOB SERPL: 1.3 {RATIO} (ref 1–2.5)
ALLEN TEST: ABNORMAL
ALLEN TEST: POSITIVE
ALP SERPL-CCNC: 77 U/L (ref 40–129)
ALT SERPL-CCNC: 126 U/L (ref 5–41)
ANION GAP SERPL CALCULATED.3IONS-SCNC: 11 MMOL/L (ref 9–17)
ANION GAP SERPL CALCULATED.3IONS-SCNC: 17 MMOL/L (ref 9–17)
ANTIBODY SCREEN: NEGATIVE
ARM BAND NUMBER: NORMAL
AST SERPL-CCNC: 190 U/L
BASOPHILS # BLD: 0 K/UL (ref 0–0.2)
BASOPHILS # BLD: 0.1 K/UL (ref 0–0.2)
BASOPHILS NFR BLD: 0 % (ref 0–2)
BASOPHILS NFR BLD: 1 % (ref 0–2)
BILIRUB SERPL-MCNC: 0.6 MG/DL (ref 0.3–1.2)
BLOOD BANK BLOOD PRODUCT EXPIRATION DATE: NORMAL
BLOOD BANK BLOOD PRODUCT EXPIRATION DATE: NORMAL
BLOOD BANK DISPENSE STATUS: NORMAL
BLOOD BANK DISPENSE STATUS: NORMAL
BLOOD BANK ISBT PRODUCT BLOOD TYPE: 600
BLOOD BANK ISBT PRODUCT BLOOD TYPE: 6200
BLOOD BANK PRODUCT CODE: NORMAL
BLOOD BANK PRODUCT CODE: NORMAL
BLOOD BANK SAMPLE EXPIRATION: NORMAL
BLOOD BANK UNIT TYPE AND RH: NORMAL
BLOOD BANK UNIT TYPE AND RH: NORMAL
BPU ID: NORMAL
BPU ID: NORMAL
BUN SERPL-MCNC: 34 MG/DL (ref 8–23)
BUN SERPL-MCNC: 39 MG/DL (ref 8–23)
CA-I BLD-SCNC: 1.06 MMOL/L (ref 1.13–1.33)
CALCIUM SERPL-MCNC: 7.2 MG/DL (ref 8.6–10.4)
CALCIUM SERPL-MCNC: 7.3 MG/DL (ref 8.6–10.4)
CHLORIDE SERPL-SCNC: 107 MMOL/L (ref 98–107)
CHLORIDE SERPL-SCNC: 107 MMOL/L (ref 98–107)
CO2 SERPL-SCNC: 20 MMOL/L (ref 20–31)
CO2 SERPL-SCNC: 28 MMOL/L (ref 20–31)
COMPONENT: NORMAL
COMPONENT: NORMAL
CREAT SERPL-MCNC: 2 MG/DL (ref 0.7–1.2)
CREAT SERPL-MCNC: 2.1 MG/DL (ref 0.7–1.2)
CROSSMATCH RESULT: NORMAL
CROSSMATCH RESULT: NORMAL
EOSINOPHIL # BLD: 0.1 K/UL (ref 0–0.4)
EOSINOPHIL # BLD: 0.42 K/UL (ref 0–0.4)
EOSINOPHILS RELATIVE PERCENT: 1 % (ref 1–4)
EOSINOPHILS RELATIVE PERCENT: 2 % (ref 1–4)
ERYTHROCYTE [DISTWIDTH] IN BLOOD BY AUTOMATED COUNT: 19.2 % (ref 12.5–15.4)
ERYTHROCYTE [DISTWIDTH] IN BLOOD BY AUTOMATED COUNT: 19.3 % (ref 12.5–15.4)
FIO2: 100
FIO2: 100
FIO2: 28
FIO2: 60
GFR SERPL CREATININE-BSD FRML MDRD: 32 ML/MIN/1.73M2
GFR SERPL CREATININE-BSD FRML MDRD: 34 ML/MIN/1.73M2
GLUCOSE BLD-MCNC: 134 MG/DL (ref 75–110)
GLUCOSE BLD-MCNC: 134 MG/DL (ref 75–110)
GLUCOSE BLD-MCNC: 156 MG/DL (ref 75–110)
GLUCOSE BLD-MCNC: 230 MG/DL (ref 75–110)
GLUCOSE BLD-MCNC: 274 MG/DL (ref 74–100)
GLUCOSE BLD-MCNC: 300 MG/DL (ref 74–100)
GLUCOSE SERPL-MCNC: 237 MG/DL (ref 70–99)
GLUCOSE SERPL-MCNC: 330 MG/DL (ref 70–99)
HCO3 VENOUS: 23.9 MMOL/L (ref 22–29)
HCT VFR BLD AUTO: 26.3 % (ref 41–53)
HCT VFR BLD AUTO: 26.5 % (ref 41–53)
HCT VFR BLD AUTO: 26.6 % (ref 41–53)
HCT VFR BLD AUTO: 30.3 % (ref 40.7–50.3)
HGB BLD-MCNC: 8.5 G/DL (ref 13.5–17.5)
HGB BLD-MCNC: 8.6 G/DL (ref 13.5–17.5)
HGB BLD-MCNC: 8.7 G/DL (ref 13.5–17.5)
HGB BLD-MCNC: 9.4 G/DL (ref 13–17)
INR PPP: 1.2
LACTIC ACID, WHOLE BLOOD: 3 MMOL/L (ref 0.7–2.1)
LYMPHOCYTES NFR BLD: 1.7 K/UL (ref 1–4.8)
LYMPHOCYTES NFR BLD: 6.45 K/UL (ref 1–4.8)
LYMPHOCYTES RELATIVE PERCENT: 20 % (ref 24–44)
LYMPHOCYTES RELATIVE PERCENT: 31 % (ref 24–44)
MAGNESIUM SERPL-MCNC: 2.1 MG/DL (ref 1.6–2.6)
MCH RBC QN AUTO: 28.3 PG (ref 26–34)
MCH RBC QN AUTO: 28.6 PG (ref 26–34)
MCHC RBC AUTO-ENTMCNC: 32.1 G/DL (ref 31–37)
MCHC RBC AUTO-ENTMCNC: 32.7 G/DL (ref 31–37)
MCV RBC AUTO: 87.5 FL (ref 80–100)
MCV RBC AUTO: 88.2 FL (ref 80–100)
MODE: ABNORMAL
MONOCYTES NFR BLD: 0.6 K/UL (ref 0.1–1.2)
MONOCYTES NFR BLD: 0.62 K/UL (ref 0.1–0.8)
MONOCYTES NFR BLD: 3 % (ref 1–7)
MONOCYTES NFR BLD: 7 % (ref 2–11)
MORPHOLOGY: NORMAL
NEGATIVE BASE EXCESS, ART: 2.6 MMOL/L (ref 0–2)
NEGATIVE BASE EXCESS, ART: 6.6 MMOL/L (ref 0–2)
NEGATIVE BASE EXCESS, ART: 6.7 MMOL/L (ref 0–2)
NEGATIVE BASE EXCESS, VEN: 0.7 MMOL/L (ref 0–2)
NEUTROPHILS NFR BLD: 64 % (ref 36–66)
NEUTROPHILS NFR BLD: 71 % (ref 36–66)
NEUTS SEG NFR BLD: 13.31 K/UL (ref 1.8–7.7)
NEUTS SEG NFR BLD: 6.3 K/UL (ref 1.8–7.7)
O2 DELIVERY DEVICE: ABNORMAL
O2 SAT, VEN: 92.7 % (ref 60–85)
PARTIAL THROMBOPLASTIN TIME: 27.4 SEC (ref 23–36.5)
PATIENT TEMP: 37.7
PCO2, VEN: 38.2 MM HG (ref 41–51)
PH VENOUS: 7.4 (ref 7.32–7.43)
PLATELET # BLD AUTO: 149 K/UL (ref 140–450)
PLATELET # BLD AUTO: 202 K/UL (ref 140–450)
PMV BLD AUTO: 7.6 FL (ref 6–12)
PMV BLD AUTO: 7.8 FL (ref 6–12)
PO2, VEN: 65.4 MM HG (ref 30–50)
POC HCO3: 20.4 MMOL/L (ref 21–28)
POC HCO3: 21.4 MMOL/L (ref 21–28)
POC HCO3: 25.6 MMOL/L (ref 21–28)
POC O2 SATURATION: 92.9 % (ref 94–98)
POC O2 SATURATION: 99.3 % (ref 94–98)
POC O2 SATURATION: 99.9 % (ref 94–98)
POC PCO2 TEMP: 48.7 MM HG
POC PCO2: 47.2 MM HG (ref 35–48)
POC PCO2: 54 MM HG (ref 35–48)
POC PCO2: 63.6 MM HG (ref 35–48)
POC PH TEMP: 7.24
POC PH: 7.21 (ref 7.35–7.45)
POC PH: 7.21 (ref 7.35–7.45)
POC PH: 7.24 (ref 7.35–7.45)
POC PO2 TEMP: 179.3 MM HG
POC PO2: 175.4 MM HG (ref 83–108)
POC PO2: 437.1 MM HG (ref 83–108)
POC PO2: 82 MM HG (ref 83–108)
POTASSIUM SERPL-SCNC: 4 MMOL/L (ref 3.7–5.3)
POTASSIUM SERPL-SCNC: 4.5 MMOL/L (ref 3.7–5.3)
PROT SERPL-MCNC: 4.5 G/DL (ref 6.4–8.3)
PROTHROMBIN TIME: 15.2 SEC (ref 11.7–14.9)
RBC # BLD AUTO: 3 M/UL (ref 4.5–5.9)
RBC # BLD AUTO: 3.01 M/UL (ref 4.5–5.9)
SAMPLE SITE: ABNORMAL
SODIUM SERPL-SCNC: 144 MMOL/L (ref 135–144)
SODIUM SERPL-SCNC: 146 MMOL/L (ref 135–144)
TRANSFUSION STATUS: NORMAL
TRANSFUSION STATUS: NORMAL
TROPONIN I SERPL HS-MCNC: 233 NG/L (ref 0–22)
TROPONIN I SERPL HS-MCNC: 237 NG/L (ref 0–22)
UNIT DIVISION: 0
UNIT DIVISION: 0
UNIT ISSUE DATE/TIME: NORMAL
UNIT ISSUE DATE/TIME: NORMAL
WBC OTHER # BLD: 20.8 K/UL (ref 3.5–11)
WBC OTHER # BLD: 8.7 K/UL (ref 3.5–11)

## 2024-02-29 PROCEDURE — 6370000000 HC RX 637 (ALT 250 FOR IP): Performed by: STUDENT IN AN ORGANIZED HEALTH CARE EDUCATION/TRAINING PROGRAM

## 2024-02-29 PROCEDURE — 5A1935Z RESPIRATORY VENTILATION, LESS THAN 24 CONSECUTIVE HOURS: ICD-10-PCS | Performed by: INTERNAL MEDICINE

## 2024-02-29 PROCEDURE — 85610 PROTHROMBIN TIME: CPT

## 2024-02-29 PROCEDURE — 3700000001 HC ADD 15 MINUTES (ANESTHESIA): Performed by: INTERNAL MEDICINE

## 2024-02-29 PROCEDURE — 06HM33Z INSERTION OF INFUSION DEVICE INTO RIGHT FEMORAL VEIN, PERCUTANEOUS APPROACH: ICD-10-PCS | Performed by: INTERNAL MEDICINE

## 2024-02-29 PROCEDURE — 99291 CRITICAL CARE FIRST HOUR: CPT | Performed by: INTERNAL MEDICINE

## 2024-02-29 PROCEDURE — 0DJD8ZZ INSPECTION OF LOWER INTESTINAL TRACT, VIA NATURAL OR ARTIFICIAL OPENING ENDOSCOPIC: ICD-10-PCS | Performed by: INTERNAL MEDICINE

## 2024-02-29 PROCEDURE — 6360000002 HC RX W HCPCS: Performed by: INTERNAL MEDICINE

## 2024-02-29 PROCEDURE — 03HY32Z INSERTION OF MONITORING DEVICE INTO UPPER ARTERY, PERCUTANEOUS APPROACH: ICD-10-PCS | Performed by: INTERNAL MEDICINE

## 2024-02-29 PROCEDURE — 3700000000 HC ANESTHESIA ATTENDED CARE: Performed by: INTERNAL MEDICINE

## 2024-02-29 PROCEDURE — 6360000002 HC RX W HCPCS: Performed by: NURSE PRACTITIONER

## 2024-02-29 PROCEDURE — A4216 STERILE WATER/SALINE, 10 ML: HCPCS | Performed by: STUDENT IN AN ORGANIZED HEALTH CARE EDUCATION/TRAINING PROGRAM

## 2024-02-29 PROCEDURE — 82330 ASSAY OF CALCIUM: CPT

## 2024-02-29 PROCEDURE — 80048 BASIC METABOLIC PNL TOTAL CA: CPT

## 2024-02-29 PROCEDURE — 84484 ASSAY OF TROPONIN QUANT: CPT

## 2024-02-29 PROCEDURE — 6360000002 HC RX W HCPCS

## 2024-02-29 PROCEDURE — 94002 VENT MGMT INPAT INIT DAY: CPT

## 2024-02-29 PROCEDURE — C9113 INJ PANTOPRAZOLE SODIUM, VIA: HCPCS

## 2024-02-29 PROCEDURE — 6370000000 HC RX 637 (ALT 250 FOR IP)

## 2024-02-29 PROCEDURE — 71045 X-RAY EXAM CHEST 1 VIEW: CPT

## 2024-02-29 PROCEDURE — 5A12012 PERFORMANCE OF CARDIAC OUTPUT, SINGLE, MANUAL: ICD-10-PCS | Performed by: INTERNAL MEDICINE

## 2024-02-29 PROCEDURE — 3E043XZ INTRODUCTION OF VASOPRESSOR INTO CENTRAL VEIN, PERCUTANEOUS APPROACH: ICD-10-PCS | Performed by: INTERNAL MEDICINE

## 2024-02-29 PROCEDURE — 82803 BLOOD GASES ANY COMBINATION: CPT

## 2024-02-29 PROCEDURE — 2580000003 HC RX 258: Performed by: STUDENT IN AN ORGANIZED HEALTH CARE EDUCATION/TRAINING PROGRAM

## 2024-02-29 PROCEDURE — 0BH17EZ INSERTION OF ENDOTRACHEAL AIRWAY INTO TRACHEA, VIA NATURAL OR ARTIFICIAL OPENING: ICD-10-PCS | Performed by: INTERNAL MEDICINE

## 2024-02-29 PROCEDURE — 36600 WITHDRAWAL OF ARTERIAL BLOOD: CPT

## 2024-02-29 PROCEDURE — 85730 THROMBOPLASTIN TIME PARTIAL: CPT

## 2024-02-29 PROCEDURE — 2500000003 HC RX 250 WO HCPCS: Performed by: STUDENT IN AN ORGANIZED HEALTH CARE EDUCATION/TRAINING PROGRAM

## 2024-02-29 PROCEDURE — 36415 COLL VENOUS BLD VENIPUNCTURE: CPT

## 2024-02-29 PROCEDURE — 85025 COMPLETE CBC W/AUTO DIFF WBC: CPT

## 2024-02-29 PROCEDURE — 2500000003 HC RX 250 WO HCPCS

## 2024-02-29 PROCEDURE — 83605 ASSAY OF LACTIC ACID: CPT

## 2024-02-29 PROCEDURE — 2500000003 HC RX 250 WO HCPCS: Performed by: NURSE ANESTHETIST, CERTIFIED REGISTERED

## 2024-02-29 PROCEDURE — A4216 STERILE WATER/SALINE, 10 ML: HCPCS

## 2024-02-29 PROCEDURE — 45378 DIAGNOSTIC COLONOSCOPY: CPT | Performed by: INTERNAL MEDICINE

## 2024-02-29 PROCEDURE — 99239 HOSP IP/OBS DSCHRG MGMT >30: CPT | Performed by: STUDENT IN AN ORGANIZED HEALTH CARE EDUCATION/TRAINING PROGRAM

## 2024-02-29 PROCEDURE — 80053 COMPREHEN METABOLIC PANEL: CPT

## 2024-02-29 PROCEDURE — 36620 INSERTION CATHETER ARTERY: CPT

## 2024-02-29 PROCEDURE — 2580000003 HC RX 258

## 2024-02-29 PROCEDURE — 82947 ASSAY GLUCOSE BLOOD QUANT: CPT

## 2024-02-29 PROCEDURE — 2000000000 HC ICU R&B

## 2024-02-29 PROCEDURE — 74018 RADEX ABDOMEN 1 VIEW: CPT

## 2024-02-29 PROCEDURE — 83735 ASSAY OF MAGNESIUM: CPT

## 2024-02-29 PROCEDURE — 85018 HEMOGLOBIN: CPT

## 2024-02-29 PROCEDURE — C9113 INJ PANTOPRAZOLE SODIUM, VIA: HCPCS | Performed by: STUDENT IN AN ORGANIZED HEALTH CARE EDUCATION/TRAINING PROGRAM

## 2024-02-29 PROCEDURE — 6360000002 HC RX W HCPCS: Performed by: NURSE ANESTHETIST, CERTIFIED REGISTERED

## 2024-02-29 PROCEDURE — 2580000003 HC RX 258: Performed by: NURSE PRACTITIONER

## 2024-02-29 PROCEDURE — 3609027000 HC COLONOSCOPY: Performed by: INTERNAL MEDICINE

## 2024-02-29 PROCEDURE — 6360000002 HC RX W HCPCS: Performed by: STUDENT IN AN ORGANIZED HEALTH CARE EDUCATION/TRAINING PROGRAM

## 2024-02-29 PROCEDURE — 85014 HEMATOCRIT: CPT

## 2024-02-29 PROCEDURE — 94003 VENT MGMT INPAT SUBQ DAY: CPT

## 2024-02-29 PROCEDURE — 94761 N-INVAS EAR/PLS OXIMETRY MLT: CPT

## 2024-02-29 PROCEDURE — 6370000000 HC RX 637 (ALT 250 FOR IP): Performed by: NURSE PRACTITIONER

## 2024-02-29 PROCEDURE — 2709999900 HC NON-CHARGEABLE SUPPLY: Performed by: INTERNAL MEDICINE

## 2024-02-29 PROCEDURE — 37799 UNLISTED PX VASCULAR SURGERY: CPT

## 2024-02-29 PROCEDURE — 2700000000 HC OXYGEN THERAPY PER DAY

## 2024-02-29 PROCEDURE — 87641 MR-STAPH DNA AMP PROBE: CPT

## 2024-02-29 RX ORDER — MIDAZOLAM HYDROCHLORIDE 1 MG/ML
INJECTION INTRAMUSCULAR; INTRAVENOUS PRN
Status: DISCONTINUED | OUTPATIENT
Start: 2024-02-29 | End: 2024-02-29 | Stop reason: SDUPTHER

## 2024-02-29 RX ORDER — FENTANYL CITRATE 50 UG/ML
INJECTION, SOLUTION INTRAMUSCULAR; INTRAVENOUS
Status: COMPLETED | OUTPATIENT
Start: 2024-02-29 | End: 2024-02-29

## 2024-02-29 RX ORDER — POLYETHYLENE GLYCOL 3350 17 G/17G
17 POWDER, FOR SOLUTION ORAL DAILY PRN
Status: DISCONTINUED | OUTPATIENT
Start: 2024-02-29 | End: 2024-03-15 | Stop reason: HOSPADM

## 2024-02-29 RX ORDER — DEXTROSE MONOHYDRATE 100 MG/ML
INJECTION, SOLUTION INTRAVENOUS CONTINUOUS PRN
Status: DISCONTINUED | OUTPATIENT
Start: 2024-02-29 | End: 2024-03-07 | Stop reason: SDUPTHER

## 2024-02-29 RX ORDER — LABETALOL HYDROCHLORIDE 5 MG/ML
10 INJECTION, SOLUTION INTRAVENOUS
Status: CANCELLED | OUTPATIENT
Start: 2024-02-29

## 2024-02-29 RX ORDER — EPINEPHRINE IN SOD CHLOR,ISO 1 MG/10 ML
SYRINGE (ML) INTRAVENOUS
Status: COMPLETED | OUTPATIENT
Start: 2024-02-29 | End: 2024-02-29

## 2024-02-29 RX ORDER — ONDANSETRON 2 MG/ML
4 INJECTION INTRAMUSCULAR; INTRAVENOUS EVERY 6 HOURS PRN
Status: DISCONTINUED | OUTPATIENT
Start: 2024-02-29 | End: 2024-03-15 | Stop reason: HOSPADM

## 2024-02-29 RX ORDER — MIDAZOLAM HYDROCHLORIDE 2 MG/2ML
2 INJECTION, SOLUTION INTRAMUSCULAR; INTRAVENOUS
Status: CANCELLED | OUTPATIENT
Start: 2024-02-29 | End: 2024-03-01

## 2024-02-29 RX ORDER — SODIUM CHLORIDE, SODIUM LACTATE, POTASSIUM CHLORIDE, CALCIUM CHLORIDE 600; 310; 30; 20 MG/100ML; MG/100ML; MG/100ML; MG/100ML
INJECTION, SOLUTION INTRAVENOUS CONTINUOUS
Status: DISCONTINUED | OUTPATIENT
Start: 2024-02-29 | End: 2024-02-29

## 2024-02-29 RX ORDER — PROMETHAZINE HYDROCHLORIDE 25 MG/ML
6.25 INJECTION, SOLUTION INTRAMUSCULAR; INTRAVENOUS EVERY 5 MIN PRN
Status: CANCELLED | OUTPATIENT
Start: 2024-02-29

## 2024-02-29 RX ORDER — SODIUM CHLORIDE 9 MG/ML
INJECTION, SOLUTION INTRAVENOUS PRN
Status: DISCONTINUED | OUTPATIENT
Start: 2024-02-29 | End: 2024-03-15 | Stop reason: HOSPADM

## 2024-02-29 RX ORDER — NOREPINEPHRINE BITARTRATE 0.06 MG/ML
1-100 INJECTION, SOLUTION INTRAVENOUS CONTINUOUS
Status: DISCONTINUED | OUTPATIENT
Start: 2024-02-29 | End: 2024-03-02

## 2024-02-29 RX ORDER — SODIUM CHLORIDE 0.9 % (FLUSH) 0.9 %
5-40 SYRINGE (ML) INJECTION EVERY 12 HOURS SCHEDULED
Status: CANCELLED | OUTPATIENT
Start: 2024-02-29

## 2024-02-29 RX ORDER — PROPOFOL 10 MG/ML
INJECTION, EMULSION INTRAVENOUS PRN
Status: DISCONTINUED | OUTPATIENT
Start: 2024-02-29 | End: 2024-02-29 | Stop reason: SDUPTHER

## 2024-02-29 RX ORDER — MIDAZOLAM HYDROCHLORIDE 2 MG/2ML
INJECTION, SOLUTION INTRAMUSCULAR; INTRAVENOUS
Status: COMPLETED | OUTPATIENT
Start: 2024-02-29 | End: 2024-02-29

## 2024-02-29 RX ORDER — NOREPINEPHRINE BITARTRATE 0.06 MG/ML
1-100 INJECTION, SOLUTION INTRAVENOUS CONTINUOUS
Status: DISCONTINUED | OUTPATIENT
Start: 2024-02-29 | End: 2024-02-29 | Stop reason: HOSPADM

## 2024-02-29 RX ORDER — OXYCODONE HYDROCHLORIDE AND ACETAMINOPHEN 5; 325 MG/1; MG/1
2 TABLET ORAL
Status: CANCELLED | OUTPATIENT
Start: 2024-02-29 | End: 2024-03-01

## 2024-02-29 RX ORDER — ACETAMINOPHEN 650 MG/1
650 SUPPOSITORY RECTAL EVERY 6 HOURS PRN
Status: DISCONTINUED | OUTPATIENT
Start: 2024-02-29 | End: 2024-03-15 | Stop reason: HOSPADM

## 2024-02-29 RX ORDER — MEPERIDINE HYDROCHLORIDE 50 MG/ML
12.5 INJECTION INTRAMUSCULAR; INTRAVENOUS; SUBCUTANEOUS EVERY 5 MIN PRN
Status: CANCELLED | OUTPATIENT
Start: 2024-02-29

## 2024-02-29 RX ORDER — MIDAZOLAM HYDROCHLORIDE 2 MG/2ML
2 INJECTION, SOLUTION INTRAMUSCULAR; INTRAVENOUS EVERY 4 HOURS
Status: DISCONTINUED | OUTPATIENT
Start: 2024-02-29 | End: 2024-02-29 | Stop reason: HOSPADM

## 2024-02-29 RX ORDER — POTASSIUM CHLORIDE 7.45 MG/ML
10 INJECTION INTRAVENOUS PRN
Status: DISCONTINUED | OUTPATIENT
Start: 2024-02-29 | End: 2024-03-15 | Stop reason: HOSPADM

## 2024-02-29 RX ORDER — SODIUM CHLORIDE 0.9 % (FLUSH) 0.9 %
5-40 SYRINGE (ML) INJECTION PRN
Status: DISCONTINUED | OUTPATIENT
Start: 2024-02-29 | End: 2024-02-29 | Stop reason: HOSPADM

## 2024-02-29 RX ORDER — METOCLOPRAMIDE HYDROCHLORIDE 5 MG/ML
10 INJECTION INTRAMUSCULAR; INTRAVENOUS
Status: CANCELLED | OUTPATIENT
Start: 2024-02-29 | End: 2024-03-01

## 2024-02-29 RX ORDER — 0.9 % SODIUM CHLORIDE 0.9 %
500 INTRAVENOUS SOLUTION INTRAVENOUS ONCE
Status: COMPLETED | OUTPATIENT
Start: 2024-02-29 | End: 2024-02-29

## 2024-02-29 RX ORDER — CALCIUM GLUCONATE 20 MG/ML
2000 INJECTION, SOLUTION INTRAVENOUS ONCE
Status: COMPLETED | OUTPATIENT
Start: 2024-02-29 | End: 2024-02-29

## 2024-02-29 RX ORDER — IPRATROPIUM BROMIDE AND ALBUTEROL SULFATE 2.5; .5 MG/3ML; MG/3ML
1 SOLUTION RESPIRATORY (INHALATION)
Status: CANCELLED | OUTPATIENT
Start: 2024-02-29 | End: 2024-03-01

## 2024-02-29 RX ORDER — PHENYLEPHRINE HCL IN 0.9% NACL 1 MG/10 ML
SYRINGE (ML) INTRAVENOUS PRN
Status: DISCONTINUED | OUTPATIENT
Start: 2024-02-29 | End: 2024-02-29 | Stop reason: SDUPTHER

## 2024-02-29 RX ORDER — OXYCODONE HYDROCHLORIDE AND ACETAMINOPHEN 5; 325 MG/1; MG/1
1 TABLET ORAL
Status: CANCELLED | OUTPATIENT
Start: 2024-02-29 | End: 2024-03-01

## 2024-02-29 RX ORDER — MORPHINE SULFATE 2 MG/ML
2 INJECTION, SOLUTION INTRAMUSCULAR; INTRAVENOUS EVERY 5 MIN PRN
Status: CANCELLED | OUTPATIENT
Start: 2024-02-29

## 2024-02-29 RX ORDER — SEVOFLURANE 250 ML/250ML
LIQUID RESPIRATORY (INHALATION)
Status: DISCONTINUED
Start: 2024-02-29 | End: 2024-02-29 | Stop reason: HOSPADM

## 2024-02-29 RX ORDER — DIPHENHYDRAMINE HYDROCHLORIDE 50 MG/ML
12.5 INJECTION INTRAMUSCULAR; INTRAVENOUS
Status: CANCELLED | OUTPATIENT
Start: 2024-02-29 | End: 2024-03-01

## 2024-02-29 RX ORDER — EPINEPHRINE 1 MG/ML(1)
AMPUL (ML) INJECTION PRN
Status: DISCONTINUED | OUTPATIENT
Start: 2024-02-29 | End: 2024-02-29 | Stop reason: SDUPTHER

## 2024-02-29 RX ORDER — GLUCAGON 1 MG/ML
1 KIT INJECTION PRN
Status: DISCONTINUED | OUTPATIENT
Start: 2024-02-29 | End: 2024-03-07 | Stop reason: SDUPTHER

## 2024-02-29 RX ORDER — ONDANSETRON 2 MG/ML
4 INJECTION INTRAMUSCULAR; INTRAVENOUS
Status: CANCELLED | OUTPATIENT
Start: 2024-02-29 | End: 2024-03-01

## 2024-02-29 RX ORDER — SODIUM CHLORIDE 9 MG/ML
INJECTION, SOLUTION INTRAVENOUS PRN
Status: CANCELLED | OUTPATIENT
Start: 2024-02-29

## 2024-02-29 RX ORDER — INSULIN LISPRO 100 [IU]/ML
0-4 INJECTION, SOLUTION INTRAVENOUS; SUBCUTANEOUS NIGHTLY
Status: DISCONTINUED | OUTPATIENT
Start: 2024-02-29 | End: 2024-03-03

## 2024-02-29 RX ORDER — HYDRALAZINE HYDROCHLORIDE 20 MG/ML
10 INJECTION INTRAMUSCULAR; INTRAVENOUS
Status: CANCELLED | OUTPATIENT
Start: 2024-02-29

## 2024-02-29 RX ORDER — SODIUM CHLORIDE 0.9 % (FLUSH) 0.9 %
5-40 SYRINGE (ML) INJECTION PRN
Status: DISCONTINUED | OUTPATIENT
Start: 2024-02-29 | End: 2024-03-15 | Stop reason: HOSPADM

## 2024-02-29 RX ORDER — SODIUM CHLORIDE 0.9 % (FLUSH) 0.9 %
5-40 SYRINGE (ML) INJECTION EVERY 12 HOURS SCHEDULED
Status: DISCONTINUED | OUTPATIENT
Start: 2024-02-29 | End: 2024-03-15 | Stop reason: HOSPADM

## 2024-02-29 RX ORDER — MIDAZOLAM HYDROCHLORIDE 2 MG/2ML
2 INJECTION, SOLUTION INTRAMUSCULAR; INTRAVENOUS ONCE
Status: DISCONTINUED | OUTPATIENT
Start: 2024-02-29 | End: 2024-03-04

## 2024-02-29 RX ORDER — 0.9 % SODIUM CHLORIDE 0.9 %
750 INTRAVENOUS SOLUTION INTRAVENOUS ONCE
Status: DISCONTINUED | OUTPATIENT
Start: 2024-02-29 | End: 2024-02-29 | Stop reason: HOSPADM

## 2024-02-29 RX ORDER — GLYCOPYRROLATE 0.2 MG/ML
0.4 INJECTION INTRAMUSCULAR; INTRAVENOUS ONCE
Status: CANCELLED | OUTPATIENT
Start: 2024-02-29 | End: 2024-02-29

## 2024-02-29 RX ORDER — MIDAZOLAM HYDROCHLORIDE 1 MG/ML
1-10 INJECTION, SOLUTION INTRAVENOUS CONTINUOUS
Status: DISCONTINUED | OUTPATIENT
Start: 2024-02-29 | End: 2024-03-02

## 2024-02-29 RX ORDER — MAGNESIUM SULFATE IN WATER 40 MG/ML
2000 INJECTION, SOLUTION INTRAVENOUS PRN
Status: DISCONTINUED | OUTPATIENT
Start: 2024-02-29 | End: 2024-03-15 | Stop reason: HOSPADM

## 2024-02-29 RX ORDER — POTASSIUM CHLORIDE 29.8 MG/ML
20 INJECTION INTRAVENOUS PRN
Status: DISCONTINUED | OUTPATIENT
Start: 2024-02-29 | End: 2024-03-15 | Stop reason: HOSPADM

## 2024-02-29 RX ORDER — ACETAMINOPHEN 325 MG/1
650 TABLET ORAL EVERY 6 HOURS PRN
Status: DISCONTINUED | OUTPATIENT
Start: 2024-02-29 | End: 2024-03-15 | Stop reason: HOSPADM

## 2024-02-29 RX ORDER — LIDOCAINE HYDROCHLORIDE 10 MG/ML
1 INJECTION, SOLUTION EPIDURAL; INFILTRATION; INTRACAUDAL; PERINEURAL
Status: DISCONTINUED | OUTPATIENT
Start: 2024-02-29 | End: 2024-02-29 | Stop reason: HOSPADM

## 2024-02-29 RX ORDER — SODIUM CHLORIDE 0.9 % (FLUSH) 0.9 %
5-40 SYRINGE (ML) INJECTION PRN
Status: CANCELLED | OUTPATIENT
Start: 2024-02-29

## 2024-02-29 RX ORDER — SODIUM CHLORIDE 0.9 % (FLUSH) 0.9 %
5-40 SYRINGE (ML) INJECTION EVERY 12 HOURS SCHEDULED
Status: DISCONTINUED | OUTPATIENT
Start: 2024-02-29 | End: 2024-02-29 | Stop reason: HOSPADM

## 2024-02-29 RX ORDER — ONDANSETRON 4 MG/1
4 TABLET, ORALLY DISINTEGRATING ORAL EVERY 8 HOURS PRN
Status: DISCONTINUED | OUTPATIENT
Start: 2024-02-29 | End: 2024-03-15 | Stop reason: HOSPADM

## 2024-02-29 RX ORDER — INSULIN LISPRO 100 [IU]/ML
0-8 INJECTION, SOLUTION INTRAVENOUS; SUBCUTANEOUS
Status: DISCONTINUED | OUTPATIENT
Start: 2024-02-29 | End: 2024-03-03

## 2024-02-29 RX ORDER — ROCURONIUM BROMIDE 10 MG/ML
INJECTION, SOLUTION INTRAVENOUS PRN
Status: DISCONTINUED | OUTPATIENT
Start: 2024-02-29 | End: 2024-02-29 | Stop reason: SDUPTHER

## 2024-02-29 RX ORDER — SODIUM CHLORIDE 9 MG/ML
INJECTION, SOLUTION INTRAVENOUS PRN
Status: DISCONTINUED | OUTPATIENT
Start: 2024-02-29 | End: 2024-02-29 | Stop reason: HOSPADM

## 2024-02-29 RX ORDER — NOREPINEPHRINE BITARTRATE 0.06 MG/ML
INJECTION, SOLUTION INTRAVENOUS
Status: COMPLETED
Start: 2024-02-29 | End: 2024-02-29

## 2024-02-29 RX ADMIN — MIDAZOLAM HYDROCHLORIDE 2 MG: 1 INJECTION, SOLUTION INTRAMUSCULAR; INTRAVENOUS at 15:10

## 2024-02-29 RX ADMIN — EPINEPHRINE 1 MG: 0.1 INJECTION, SOLUTION ENDOTRACHEAL; INTRACARDIAC; INTRAVENOUS at 12:48

## 2024-02-29 RX ADMIN — Medication 100 MCG: at 12:59

## 2024-02-29 RX ADMIN — PANTOPRAZOLE SODIUM 40 MG: 40 INJECTION, POWDER, FOR SOLUTION INTRAVENOUS at 05:11

## 2024-02-29 RX ADMIN — EPINEPHRINE 1 MG: 0.1 INJECTION, SOLUTION ENDOTRACHEAL; INTRACARDIAC; INTRAVENOUS at 14:17

## 2024-02-29 RX ADMIN — Medication 40 MCG/MIN: at 21:43

## 2024-02-29 RX ADMIN — SODIUM BICARBONATE 50 MEQ: 84 INJECTION, SOLUTION INTRAVENOUS at 14:19

## 2024-02-29 RX ADMIN — PANTOPRAZOLE SODIUM 40 MG: 40 INJECTION, POWDER, FOR SOLUTION INTRAVENOUS at 21:01

## 2024-02-29 RX ADMIN — CARVEDILOL 3.12 MG: 3.12 TABLET, FILM COATED ORAL at 08:19

## 2024-02-29 RX ADMIN — FENTANYL CITRATE 50 MCG: 50 INJECTION, SOLUTION INTRAMUSCULAR; INTRAVENOUS at 14:03

## 2024-02-29 RX ADMIN — EPINEPHRINE 15 MCG/MIN: 1 INJECTION INTRAMUSCULAR; INTRAVENOUS; SUBCUTANEOUS at 17:42

## 2024-02-29 RX ADMIN — Medication 5.33 MCG/MIN: at 13:58

## 2024-02-29 RX ADMIN — ROCURONIUM BROMIDE 30 MG: 10 INJECTION INTRAVENOUS at 12:55

## 2024-02-29 RX ADMIN — Medication 100 MCG: at 12:43

## 2024-02-29 RX ADMIN — LOSARTAN POTASSIUM 25 MG: 25 TABLET, FILM COATED ORAL at 08:19

## 2024-02-29 RX ADMIN — SODIUM CHLORIDE: 9 INJECTION, SOLUTION INTRAVENOUS at 07:41

## 2024-02-29 RX ADMIN — EPINEPHRINE 1 MCG/MIN: 1 INJECTION INTRAMUSCULAR; INTRAVENOUS; SUBCUTANEOUS at 13:32

## 2024-02-29 RX ADMIN — PROPOFOL 50 MG: 10 INJECTION, EMULSION INTRAVENOUS at 12:43

## 2024-02-29 RX ADMIN — NOREPINEPHRINE BITARTRATE 5.33 MCG/MIN: 0.06 INJECTION, SOLUTION INTRAVENOUS at 13:58

## 2024-02-29 RX ADMIN — INSULIN GLARGINE 40 UNITS: 100 INJECTION, SOLUTION SUBCUTANEOUS at 08:19

## 2024-02-29 RX ADMIN — SODIUM BICARBONATE 50 MEQ: 84 INJECTION, SOLUTION INTRAVENOUS at 14:18

## 2024-02-29 RX ADMIN — SODIUM CHLORIDE, PRESERVATIVE FREE 10 ML: 5 INJECTION INTRAVENOUS at 08:19

## 2024-02-29 RX ADMIN — MIDAZOLAM HYDROCHLORIDE 2 MG: 2 INJECTION, SOLUTION INTRAMUSCULAR; INTRAVENOUS at 23:19

## 2024-02-29 RX ADMIN — CIPROFLOXACIN 400 MG: 400 INJECTION, SOLUTION INTRAVENOUS at 08:18

## 2024-02-29 RX ADMIN — EPINEPHRINE 1 MG: 0.1 INJECTION, SOLUTION ENDOTRACHEAL; INTRACARDIAC; INTRAVENOUS at 13:31

## 2024-02-29 RX ADMIN — Medication 2 MG/HR: at 21:10

## 2024-02-29 RX ADMIN — Medication 25 MCG/HR: at 14:03

## 2024-02-29 RX ADMIN — CALCIUM GLUCONATE 2000 MG: 20 INJECTION, SOLUTION INTRAVENOUS at 21:26

## 2024-02-29 RX ADMIN — MIDAZOLAM 2 MG: 1 INJECTION INTRAMUSCULAR; INTRAVENOUS at 12:58

## 2024-02-29 RX ADMIN — SODIUM CHLORIDE: 9 INJECTION, SOLUTION INTRAVENOUS at 21:20

## 2024-02-29 RX ADMIN — PROPOFOL 20 MG: 10 INJECTION, EMULSION INTRAVENOUS at 12:44

## 2024-02-29 RX ADMIN — VASOPRESSIN 0.03 UNITS/MIN: 0.2 INJECTION INTRAVENOUS at 15:15

## 2024-02-29 RX ADMIN — SODIUM CHLORIDE 500 ML: 0.9 INJECTION, SOLUTION INTRAVENOUS at 02:15

## 2024-02-29 RX ADMIN — VASOPRESSIN 0.03 UNITS/MIN: 0.2 INJECTION INTRAVENOUS at 21:44

## 2024-02-29 RX ADMIN — FENTANYL CITRATE 50 MCG: 50 INJECTION, SOLUTION INTRAMUSCULAR; INTRAVENOUS at 14:42

## 2024-02-29 RX ADMIN — INSULIN LISPRO 2 UNITS: 100 INJECTION, SOLUTION INTRAVENOUS; SUBCUTANEOUS at 18:29

## 2024-02-29 RX ADMIN — EPINEPHRINE 1 MG: 0.1 INJECTION, SOLUTION ENDOTRACHEAL; INTRACARDIAC; INTRAVENOUS at 13:28

## 2024-02-29 RX ADMIN — SODIUM CHLORIDE, PRESERVATIVE FREE 10 ML: 5 INJECTION INTRAVENOUS at 21:02

## 2024-02-29 RX ADMIN — METRONIDAZOLE 500 MG: 500 INJECTION, SOLUTION INTRAVENOUS at 08:22

## 2024-02-29 RX ADMIN — Medication 200 MCG: at 12:44

## 2024-02-29 RX ADMIN — Medication 50 MCG/HR: at 19:42

## 2024-02-29 RX ADMIN — SODIUM BICARBONATE 50 MEQ: 84 INJECTION, SOLUTION INTRAVENOUS at 13:30

## 2024-02-29 RX ADMIN — Medication 200 MCG: at 12:46

## 2024-02-29 RX ADMIN — METRONIDAZOLE 500 MG: 500 INJECTION, SOLUTION INTRAVENOUS at 02:34

## 2024-02-29 ASSESSMENT — PULMONARY FUNCTION TESTS
PIF_VALUE: 33
PIF_VALUE: 25
PIF_VALUE: 24
PIF_VALUE: 34

## 2024-02-29 NOTE — PROGRESS NOTES
SPIRITUAL CARE DEPARTMENT - [facility]  Code Blue NOTE    Room # 337/337-01   Name: Kb Wilson            Episcopal: Hindu     Reason for visit: Code Blue    I visited the family.    Admit Date & Time: 2/27/2024  5:01 PM    Assessment:  Kb Wilson is a 78 y.o. male in the hospital who coded in his inpatient room. Writer worked with fellow , Satnam Lonfrancis, to support Patient's son, Antwon, who arrived to the unit. Patient was receiving care from the team as writer offered support to Son outside Pt's room.     Intervention:  I introduced myself and my title as .   Writer inquired how Son was doing.  Writer asked about Son's sources of support and strength. Writer asked if Pt draws strength from spiritual or Jehovah's witness support. Writer offered supportive presence and assurance of prayers. Writer facilitated communication between son and medical team. Writer sent a [place] message to the [facility]  to follow up and support Son and Family who will be meeting Patient at the hospital.     Outcome:  Son shared about Pt being \"a strong man.\" Son affirmed Pt's strengths, including his sense of humor. Son accessed his sense of humor during the conversation. Son shared about Pt's support system, including his four sons and his sibling. Son shared that he brought Pt back to the area a few months ago. Son noted that Pt is Hindu. Son is aware that chaplains can assist with contacting Pt's clari community and with Sacramental support. Son thanked writer for the support. Son left for [facility] to meet Pt and Family.    Plan:  Chaplains will remain available to offer spiritual and emotional support as needed.     02/29/24 1525   Encounter Summary   Encounter Overview/Reason  Crisis   Service Provided For: Family   Referral/Consult From: Physician;Nurse;Other    Support System Children;Family members   Last Encounter  02/29/24   Complexity of  Encounter High   Begin Time 1415   End Time  1525   Total Time Calculated 70 min   Crisis   Type Code Blue   Assessment/Intervention/Outcome   Assessment Anxious;Coping;Shock   Intervention Active listening;Discussed belief system/Latter day practices/clari;Discussed illness injury and it’s impact;Explored/Affirmed feelings, thoughts, concerns;Explored Coping Skills/Resources;Sustaining Presence/Ministry of presence;Prayer (assurance of)/[place]   Outcome Expressed Gratitude;Expressed feelings, needs, and concerns;Engaged in conversation   Plan and Referrals   Plan/Referrals Continue Support (comment)       Electronically signed by Chaplain Roel, on 2/29/2024 at 3:32 PM.  Spiritual Care Department  [facility]  [phone]

## 2024-02-29 NOTE — PLAN OF CARE
Problem: Discharge Planning  Goal: Discharge to home or other facility with appropriate resources  2/29/2024 1020 by Annie Caban RN  Outcome: Progressing  Flowsheets (Taken 2/29/2024 0821)  Discharge to home or other facility with appropriate resources: Identify barriers to discharge with patient and caregiver  2/29/2024 0523 by Ana Lion RN  Outcome: Progressing  Flowsheets (Taken 2/28/2024 2000)  Discharge to home or other facility with appropriate resources: Identify barriers to discharge with patient and caregiver     Problem: Safety - Adult  Goal: Free from fall injury  2/29/2024 1020 by Annie Caban RN  Outcome: Progressing  2/29/2024 0523 by Ana Lion RN  Outcome: Progressing     Problem: ABCDS Injury Assessment  Goal: Absence of physical injury  2/29/2024 1020 by Annie Caban RN  Outcome: Progressing  2/29/2024 0523 by Ana Lion RN  Outcome: Progressing     Problem: Chronic Conditions and Co-morbidities  Goal: Patient's chronic conditions and co-morbidity symptoms are monitored and maintained or improved  2/29/2024 1020 by Annie Caban RN  Outcome: Progressing  Flowsheets (Taken 2/29/2024 0821)  Care Plan - Patient's Chronic Conditions and Co-Morbidity Symptoms are Monitored and Maintained or Improved: Monitor and assess patient's chronic conditions and comorbid symptoms for stability, deterioration, or improvement  2/29/2024 0523 by Ana Lion RN  Outcome: Progressing  Flowsheets (Taken 2/28/2024 2000)  Care Plan - Patient's Chronic Conditions and Co-Morbidity Symptoms are Monitored and Maintained or Improved: Monitor and assess patient's chronic conditions and comorbid symptoms for stability, deterioration, or improvement     Problem: Pain  Goal: Verbalizes/displays adequate comfort level or baseline comfort level  2/29/2024 1020 by Annie Caban RN  Outcome: Progressing  2/29/2024 0523 by Ana Lion RN  Outcome:

## 2024-02-29 NOTE — PLAN OF CARE
Restraint type: Siderail:  2  Reason for restraints: Pulling out devices:  Pulling lines  Duration: 24 hours    Restraints must be removed when an alternative is available and effective and/or patient no longer meets criteria.    Orders must be renewed every calendar day or when discontinued.    The MD must conduct a face to face assessment within 1 calendar day of initiation when initial restraint order is verbal.      Mick Cast MD

## 2024-02-29 NOTE — PLAN OF CARE
Report given to MICU RN  at [facility] with all details of code. See code for more details:    1327 PEA- cpr, epi, bicarb  1329 ROSC    1330 PEA- cpr, epi  1332 ROSC    1417 PEA- cpr, epi, bicarb x2  1420 ROSC    1510 Pt opening eyes to voice  1513 transferred to [facility] with epi, levo, vaso and 0.9 NS infusions running per CVC femoral line. Pads remain on chest for [place].

## 2024-02-29 NOTE — ANESTHESIA PRE PROCEDURE
MD Steve       • lactated ringers IV soln infusion   IntraVENous Continuous Steve Mallory MD       • sodium chloride flush 0.9 % injection 5-40 mL  5-40 mL IntraVENous 2 times per day Steve Mallory MD       • sodium chloride flush 0.9 % injection 5-40 mL  5-40 mL IntraVENous PRN Steve Mallory MD       • 0.9 % sodium chloride infusion   IntraVENous PRN Steve Mallory MD       • 0.9 % sodium chloride infusion   IntraVENous PRN Xena Bailon APRN - CNP       • ciprofloxacin (CIPRO) IVPB 400 mg  400 mg IntraVENous 2 times per day Nena Chaves APRN - CNP   Stopped at 02/29/24 0922   • metroNIDAZOLE (FLAGYL) 500 mg in 0.9% NaCl 100 mL IVPB premix  500 mg IntraVENous Q8H Nena Chaves APRN - CNP   Stopped at 02/29/24 0922   • sodium chloride flush 0.9 % injection 5-40 mL  5-40 mL IntraVENous 2 times per day Judith Espinosa MD       • sodium chloride flush 0.9 % injection 5-40 mL  5-40 mL IntraVENous PRN Judith Espinosa MD       • 0.9 % sodium chloride infusion   IntraVENous PRN Judith Espinosa MD       • morphine (PF) injection 1 mg  1 mg IntraVENous Q5 Min PRN Judith Espinosa MD       • labetalol (NORMODYNE;TRANDATE) injection 10 mg  10 mg IntraVENous Q15 Min PRN Judith Espinosa MD        Or   • hydrALAZINE (APRESOLINE) injection 10 mg  10 mg IntraVENous Q15 Min PRN Judith Espinosa MD       • pantoprazole (PROTONIX) 40 mg in sodium chloride (PF) 0.9 % 10 mL injection  40 mg IntraVENous Q12H Mick Cast MD   40 mg at 02/29/24 0511   • carvedilol (COREG) tablet 3.125 mg  3.125 mg Oral BID  Mick Cast MD   3.125 mg at 02/29/24 0819   • losartan (COZAAR) tablet 25 mg  25 mg Oral Daily Mick Cast MD   25 mg at 02/29/24 0819   • [Held by provider] torsemide (DEMADEX) tablet 20 mg  20 mg Oral Daily Mick Cast MD       • 0.9 % sodium chloride infusion   IntraVENous Continuous Airam Welch, ELIAS -  mL/hr at 02/29/24 0741 New Bag at 02/29/24 0741   • insulin glargine (LANTUS) injection

## 2024-02-29 NOTE — PLAN OF CARE
Problem: Discharge Planning  Goal: Discharge to home or other facility with appropriate resources  Outcome: Progressing  Flowsheets (Taken 2/28/2024 2000)  Discharge to home or other facility with appropriate resources: Identify barriers to discharge with patient and caregiver     Problem: Safety - Adult  Goal: Free from fall injury  Outcome: Progressing   NO FALLS OR INJURIES THIS SHIFT, BED IN LOWEST POSITION, BRAKES ON, BED ALARM ON, CALL LIGHT IN REACH, SIDE RAILS UP X2.    Problem: ABCDS Injury Assessment  Goal: Absence of physical injury  Outcome: Progressing     Problem: Chronic Conditions and Co-morbidities  Goal: Patient's chronic conditions and co-morbidity symptoms are monitored and maintained or improved  Outcome: Progressing  Flowsheets (Taken 2/28/2024 2000)  Care Plan - Patient's Chronic Conditions and Co-Morbidity Symptoms are Monitored and Maintained or Improved: Monitor and assess patient's chronic conditions and comorbid symptoms for stability, deterioration, or improvement     Problem: Pain  Goal: Verbalizes/displays adequate comfort level or baseline comfort level  Outcome: Progressing   NO NEW SIGNS OR SYMPTOMS OF PAIN NOTED, PAIN RATING <3 ON SCALE OF 0-10 THIS SHIFT. PAIN CONTROLLED WITH MEDICATION AND REPOSITIONING.    Problem: Gastrointestinal - Adult  Goal: Minimal or absence of nausea and vomiting  Outcome: Progressing  Flowsheets (Taken 2/28/2024 2000)  Minimal or absence of nausea and vomiting: Administer IV fluids as ordered to ensure adequate hydration     Problem: Gastrointestinal - Adult  Goal: Maintains or returns to baseline bowel function  Outcome: Progressing  Flowsheets (Taken 2/28/2024 2000)  Maintains or returns to baseline bowel function: Assess bowel function     Problem: Gastrointestinal - Adult  Goal: Maintains adequate nutritional intake  Outcome: Progressing  Flowsheets (Taken 2/28/2024 2000)  Maintains adequate nutritional intake: Monitor percentage of each meal

## 2024-02-29 NOTE — ANESTHESIA POSTPROCEDURE EVALUATION
Department of Anesthesiology  Postprocedure Note    Patient: Kb Wilson  MRN: [id number]  YOB: 1945  Date of evaluation: 2/29/2024    Procedure Summary       Date: 02/29/24 Room / Location: [facility] PROCEDURE ROOM / [facility]    Anesthesia Start: 1241 Anesthesia Stop: 1317    Procedure: COLONOSCOPY DIAGNOSTIC ABORTED Diagnosis:       Rectal bleed      (Rectal bleed [K62.5])    Surgeons: Airam Olivia MD Responsible Provider: Steve Mallory MD    Anesthesia Type: MAC ASA Status: 5            Anesthesia Type: No value filed.    Gerald Phase I: Gerald Score: 8    Gerald Phase II:      Anesthesia Post Evaluation    Patient location during evaluation: ICU  Patient participation: complete - patient cannot participate  Level of consciousness: sedated and ventilated  Airway patency: patent  Nausea & Vomiting: no nausea and no vomiting  Cardiovascular status: hypertensive  Respiratory status: intubated and ventilator  Hydration status: euvolemic  Multimodal analgesia pain management approach  Pain management: adequate    No notable events documented.

## 2024-02-29 NOTE — SIGNIFICANT EVENT
RN Alexandria notified that patient coded in the ER after he received propofol, ROSC was achieved, patient is intubated and is coming on the floor under ICU status.    On arrival on the floor patient was started on fentanyl infusion and Versed pushes.  Restraints were ordered.  RN noticed that patient is hypotensive with BP 55/39 and soon he lost his pulses, code was called, CPR was started and ROSC achieved after 11 rounds of epi.  Patient also received bicarb x 3.  Family including brother Jarrett and son Antwon was updated by writer over phone.  Patient's blood pressure was soft, he was started on second pressor Levophed through peripheral line.  Critical care was consulted, stat ABG showed pH 7.212, pCO2 63.6, O2 82.  Bicarb 25.6.  NG tube was placed, portable chest x-ray confirmed placement.    Stat lab work showed leukocytosis with white count 20.8, hemoglobin 8.5.  Potassium 4, sodium 146.  Elevated liver enzyme.  Patient got central line placed by critical care attending.  [facility] access was cool and requested patient to be life flighted to medical ICU at [place].  Writer spoke to critical care attending and gave the signout.    Critical care at [place] recommended patient to get balloon pump, he discussed with cardiology and critical care attending at [facility] who suggested that patient to be transferred to [facility].  Critical care attending started patient on vasopressin.    Patient was ground transported to medical ICU with [facility] ER physician and NP.    Mick Cast MD

## 2024-02-29 NOTE — CONSULTS
[facility] M.D., M.H.A.  Dagoberto Baker M.D., M.B.A.  RAFAELA Hough P.A.C.    [facility]  [address]    Cardio-Oncology Service  [address], [address]  [place] [address]   [address] [place] [address]  Phone: [phone]   [place] [address]   Phone: [phone]  Fax: [phone]   [phone]  Fax: [phone]    Name:   Kb Wilson :  1945   MRN:   [id number] Gender:   male   PCP:  Viridiana Taveras Age: 78 y.o.   PCP Fax:  [phone]     Hospital:          [facility] Encounter Date:     24        Reason for Consult: Preop evaluation    HPI: Kb Wilson is an 78 y.o. male who presented to [facility] emergency department yesterday due to acute onset of rectal bleeding.  Patient noted bright red blood on his bedding.  Was brought to the hospital by EMS.    Workup from the ED was reviewed.  Initial hemoglobin 11.3 which dropped overnight as low as 8.6.  He did have an EGD today which did not show active source of bleeding.  High sensitive troponin was 67  BMP reviewed, creatinine 1.7 with GFR 41    Imaging studies were reviewed.  Chest x-ray was nondiagnostic, mild cardiomegaly with no acute pulmonary process  CT abdomen/pelvis showed extensive colonic diverticulosis with sigmoid colonic wall thickening  Recommend colonoscopy    Spoke with patient in his room today.  He denies any issues from a cardiac standpoint.  No current chest pain, chest tightness, palpitations or shortness of breath.  Denies lightheaded/dizziness nausea vomiting, chills, diaphoresis.  Reports he is very sleepy today.  His blood pressures have been borderline hypotensive however he denies symptoms of low blood pressure.    Previous cardiac history reviewed.  On he has known history of coronary disease with previous CABG: Ischemic  dysfunction  (restrictive pattern) is present. Lateral E' is 6.35 cm/s. Medial E' is  2.69 cm/s.    Left Atrium: Left atrium is moderately dilated. Left atrium volume  index is moderately increased. The left atrial volume index is 44.0 mL/m2.    Right Atrium: Right atrium is mildly dilated. The right atrial area is  25.0 cm2.    Aortic Valve: There is mild regurgitation. There is no evidence of  aortic valve stenosis.    Mitral Valve: There is moderate regurgitation. There is no evidence of  mitral valve stenosis.    Tricuspid Valve: There is mild regurgitation. There is no evidence of  tricuspid valve stenosis. The right ventricular systolic pressure is  moderately elevated. RVSP calculated at 46 mmHg. RVSP is based on RA  pressure of 8 mmHg. There is moderate pulmonary hypertension.       Assessment  GI bleed reticulosis  2.   Normocytic anemia  3.   History of coronary artery disease: Remote CABG  > High-sensitivity troponin 67, was previously 90 on admission from 2/23/2024  > Likely type II NSTEMI, demand ischemia with anemia/acute GI bleed  4.   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   Paroxysmal atrial fibrillation: Currently in sinus rhythm: First-degree block  > Eliquis held as well as aspirin  6.   Hypertension  7.   Hyperlipidemia  8.   Diabetes, type II      Plan  Hold Eliquis, aspirin, Farxiga for acute GI bleed  Patient cleared for colonoscopy tomorrow from cardiac standpoint at a moderate risk, no plans for further cardiac testing to lower this risk further  Hold losartan for hypotension  Hold Coreg for hypotension however use if additional rate control is needed and blood pressure allows  Cardiology will continue to follow along      The note was completed using EMR. Every effort was made to ensure accuracy; however, inadvertent computerized transcription errors may be present.  Disclaimer: This note was dictated by speech recognition. Minor errors in  transcription may be present

## 2024-02-29 NOTE — CONSULTS
[facility] PULMONARY, CRITICAL CARE & SLEEP   Eliel Mccarthy MD/ Israel Watt MD/ Dagoberto Rob MD/ Dr Caridad GRIFFIN AGACNP-BC NP-C  Anabell Rodriges APRN NP-C  Rebeka Peña APRN NP-C   CONSULT NOTE      Date of Admission: 2/27/2024  5:01 PM    Chief complaint: Postcardiac arrest    Referring Physician: * No referring provider recorded for this case *  PCP: Viridiana Taveras     History of Present Illness: Kb Wilson is a 78 y.o. male who initially presented to the emergency room on February 27 with reports of hematochezia.  There was reported 3 episodes of jennifer blood per rectum occurring about 1 hour prior to arrival.  He had an additional bright red stool in the emergency department.  He was complaining of left lower quadrant pain.  He was recently admitted from 2/22 through 2/25/2024 secondary to weakness and fall.  He has a known history of coronary artery disease with CABG, ischemic cardiomyopathy with EF in 25% range, AICD, diabetes, hypertension, hyperlipidemia, PAF and chronic kidney disease.  He is chronically on aspirin and Eliquis.  He was seen by cardiology during that admission with no change in medication.  He normally follows with Dr. Sarah Beth Little.  he was evaluated for possible viral/bacterial infection.  Viral PCR was negative.  Blood cultures were negative.    Workup in ER with CT of the abdomen and pelvis showed evidence of extensive colonic diverticulosis with sigmoid wall thickening concerning for mild diverticulitis or underlying mass.  He was reportedly pale and symptomatic with dizziness.  He was evaluated yesterday by GI.  Initial hemoglobin was 15 but dropped to 9.6.  He was given a unit of blood due to symptomatology.  He underwent EGD yesterday with GI which showed erythematous gastropathy with biopsies obtained and large diverticula in the duodenum.  No active bleeding.    He was seen by cardiology preop from colonoscopy this morning.  He did have  pneumothorax. The cardiomediastinal silhouette is stable. The osseous structures are stable.     No acute process.     XR CHEST PORTABLE    Result Date: 2/25/2024  EXAMINATION: ONE XRAY VIEW OF THE CHEST 2/25/2024 10:56 am COMPARISON: Chest x-ray dated 02/22/2024. HISTORY: ORDERING SYSTEM PROVIDED HISTORY: Sob/ hypoxia TECHNOLOGIST PROVIDED HISTORY: Sob/ hypoxia Reason for Exam: hypoxia sob FINDINGS: LINES/TUBES/OTHER: Redemonstration of left subclavian AICD and median sternotomy wires. HEART/MEDIASTINUM: The cardiac silhouette is enlarged, but stable.. PLEURA/LUNGS: There are no focal consolidations or pleural effusions. There is no appreciable pneumothorax. BONES/SOFT TISSUE: No acute abnormality.     No radiographic evidence of acute pulmonary disease. Stable cardiomegaly.     XR CHEST PORTABLE    Result Date: 2/23/2024  EXAMINATION: ONE XRAY VIEW OF THE CHEST 2/22/2024 7:28 pm COMPARISON: None. HISTORY: ORDERING SYSTEM PROVIDED HISTORY: Sepsis TECHNOLOGIST PROVIDED HISTORY: Sepsis Reason for Exam: Sepsis FINDINGS: Cardiomegaly, postthoracotomy changes and pacemaker appear stable.  No evidence of pulmonary edema or focal infiltrates.  No active pleural disease.     1. No acute cardiopulmonary process. 2. Cardiomegaly.     2/29/2024       My Imaging Interpretation: Signs of worsening pulmonary edema, cardiomegaly, ET tube in good position            Assessment:    Cardiac arrest x 3 with ROSC x 3, multiple rounds of epinephrine, bicarb  Cardiogenic shock  Acute GI bleed, transfused 1 unit  Acute hypoxic respiratory failure, intubated during arrest, requiring full ventilator support with increased PEEP  Blood loss anemia  Diverticulosis/diverticulitis  History of coronary artery disease with remote CABG  Mild troponin elevation, likely type II  History of ischemic cardiomyopathy with EF 25%, follows with Dr. Sarah Beth Little  Paroxysmal atrial fibrillation, normally maintained on Eliquis  Chronic kidney

## 2024-02-29 NOTE — PLAN OF CARE
Problem: Safety - Medical Restraint  Goal: Remains free of injury from restraints (Restraint for Interference with Medical Device)  Description: INTERVENTIONS:  1. Determine that other, less restrictive measures have been tried or would not be effective before applying the restraint  2. Evaluate the patient's condition at the time of restraint application  3. Inform patient/family regarding the reason for restraint  4. Q2H: Monitor safety, psychosocial status, comfort, nutrition and hydration  Outcome: Progressing  Flowsheets  Taken 2/29/2024 1800  Remains free of injury from restraints (restraint for interference with medical device):   Determine that other, less restrictive measures have been tried or would not be effective before applying the restraint   Every 2 hours: Monitor safety, psychosocial status, comfort, nutrition and hydration  Taken 2/29/2024 1600  Remains free of injury from restraints (restraint for interference with medical device):   Determine that other, less restrictive measures have been tried or would not be effective before applying the restraint   Every 2 hours: Monitor safety, psychosocial status, comfort, nutrition and hydration  Taken 2/29/2024 1551  Remains free of injury from restraints (restraint for interference with medical device):   Determine that other, less restrictive measures have been tried or would not be effective before applying the restraint   Every 2 hours: Monitor safety, psychosocial status, comfort, nutrition and hydration     Problem: Chronic Conditions and Co-morbidities  Goal: Patient's chronic conditions and co-morbidity symptoms are monitored and maintained or improved  Outcome: Progressing  Flowsheets (Taken 2/29/2024 1538)  Care Plan - Patient's Chronic Conditions and Co-Morbidity Symptoms are Monitored and Maintained or Improved:   Monitor and assess patient's chronic conditions and comorbid symptoms for stability, deterioration, or improvement   Collaborate

## 2024-02-29 NOTE — SIGNIFICANT EVENT
I was called stat to see the patient.  Patient was actively having CPR as I entered the room.  We did a pulse check and found that the patient had regained a pulse.  I placed an emergency central line dictated in a separate note.  We adjusted the vasopressors and ventilator as best I could.  Chest x-ray shows diffuse pulmonary edema.  Stat labs repeat sent.  Lateral transfer to [facility] had already been initiated.  Transport team came to transport the patient but because the patient was relatively unstable they were hesitant to take him.  I spoke directly to Dr. Del Real and to Dr. Cortés.  They both agree that the best course of action is to simply transport the patient.  We are aware of the risk but there is nothing else that can be done.  I believe the patient had a cardiac event.    Hemoglobin is stable    Currently on Levophed and epinephrine.  I did add an order for vasopressin.  Should be is stable for transport.    >65m CCT not including procedures    Paresh Watt MD  Pulmonary and Critical Care  [phone] [employer]  [phone] Cell

## 2024-02-29 NOTE — DISCHARGE SUMMARY
[facility]  Office: [phone]  Omar Bills DO, Salvador San DO, Lewis Celeste DO, Neo Stallings DO, Jaz Castellanos MD, Stacia Aponte MD, Calr Barajas MD, Marlena Hedrick MD,  Christian Quintero MD, Gregoria Marie MD, Alcides Sandoval DO, Riri Dent MD,  Yi Clay DO, Bethany Hall MD, Acosta Jimenez MD, Jarrett Bills DO, Diann Mari MD,  Oscar Fofana DO, Karen Bryant MD, Jemma Osei MD, Bonny Pimentel MD, Christian Sauer MD,  Nash Ramires MD, Riki Small MD, Veto Webster MD, Guy Tijerina DO, Saurabh Montano MD,  Bartolo Field MD, Shirley Waterhouse, CNP,  Olga Lidia Van, CNP, Ana Anderson, CNP, Dagoberto Bobo, CNP,  Brandie Hammond, DNP, Misti Boone, CNP, Tania Sim, CNP, Kiah Montalvo, CNP, Donna Meade, CNP, Inés Rosas, CNP, Titi Lundberg PA-C, Jada Valerio, CNS, Tess Hernandez, CNP, Xena Bailon, CNP         [facility]   IN-PATIENT SERVICE   [facility]    Discharge Summary     Patient ID: Kb Wilson  :  1945   MRN: [id number]     ACCOUNT:  [id number]   Patient's PCP: Viridiana Taveras  Admit Date: 2024   Discharge Date: 2024  Length of Stay: 2  Code Status:  Full Code  Admitting Physician: Mick Cast MD  Discharge Physician: Mick Cast MD     Active Discharge Diagnoses:     Hospital Problem Lists:  Principal Problem:    GI bleed  Active Problems:    Hypertension    Diabetes mellitus (HCC)    Rectal bleeding    Acute anemia    Lower abdominal pain    Diverticulitis    Hx of CABG    PAF (paroxysmal atrial fibrillation) (HCC)    Stage 3 chronic kidney disease (HCC)  Resolved Problems:    * No resolved hospital problems. *      Admission Condition:  fair     Discharged Condition: poor    Hospital Stay:     Hospital Course:  Kb Wilson is a 78 y.o. male who was admitted for the management of GI bleed , presented to ER with Rectal Bleeding (Pt called EMS for c/o bleeding from rectum @ 1 hour  GI.    Diet:  N.p.o.    Activity: As tolerated    Instructions to Patient:     Discharge Medications:      Medication List        ASK your doctor about these medications      aspirin 81 MG chewable tablet  Take 1 tablet by mouth daily     atorvastatin 80 MG tablet  Commonly known as: LIPITOR  Take 1 tablet by mouth nightly     calcitRIOL 0.25 MCG capsule  Commonly known as: ROCALTROL     carvedilol 3.125 MG tablet  Commonly known as: COREG  Take 1 tablet by mouth 2 times daily (with meals)     dapagliflozin 10 MG tablet  Commonly known as: FARXIGA     Eliquis 5 MG Tabs tablet  Generic drug: apixaban     famotidine 40 MG tablet  Commonly known as: PEPCID     insulin glargine 100 UNIT/ML injection pen  Commonly known as: LANTUS;BASAGLAR     losartan 25 MG tablet  Commonly known as: COZAAR     mirtazapine 7.5 MG tablet  Commonly known as: REMERON     Ozempic (0.25 or 0.5 MG/DOSE) 2 MG/3ML Sopn  Generic drug: Semaglutide(0.25 or 0.5MG/DOS)     torsemide 20 MG tablet  Commonly known as: DEMADEX              Time spent on discharge is 50 mins in patient examination, evaluation, counseling as well as medication reconciliation, prescriptions for required medications, discharge plan and follow up.    Electronically signed by   Mick Cast MD  2/29/2024  3:24 PM      Thank you Viridiana Nixon for the opportunity to be involved in this patient's care.

## 2024-02-29 NOTE — H&P
Critical Care - History and Physical Examination    Patient's name:  Kb Wilson  Medical Record Number: [id number]  Patient's account/billing number: [id number]  Patient's YOB: 1945  Age: 78 y.o.  Date of Admission: 2/29/2024  3:40 PM  Reason of ICU admission:   Date of History and Physical Examination: 2/29/2024      Primary Care Physician: Viridiana Taveras  Attending Physician:    Code Status: Full Code    Chief complaint: Cardiac arrest    Reason for ICU admission:   Cardiac arrest    History Of Present Illness:   History was obtained from chart review.      Kb Wilson is a 78 y.o. with a history of:  CAD status post CABG on Plavix  CHF with ejection fraction 20% has AICD  Paroxysmal A-fib on Eliquis  Diabetes mellitus  Hypertension  Stage III kidney disease    Patient presented to [facility] emergency department on 2/27/2024 for bright red blood when he wiped when going to the bathroom just prior to presenting to the emergency room.  Concern for GI bleed.  CT abdomen pelvis without contrast suggest diverticulosis.  Patient was admitted for GI consult was started on Protonix infusion.  Patient had EGD that was normal.  Patient was prepped for colonoscopy today.  Patient was given propofol and the colonoscopy was about to begin when he cardiac arrested.  Cardiac arrested a total of 3 times in the OR.  He was intubated.  He was then transferred to the ICU where he coded 2 more times.  He had a CODE BLUE total of 5 times [facility].  He was transferred to [facility] for cardiac evaluation.    Patient was able to have some of the colonoscopy done that did not show any active bleeding however does show diverticula consistent with diverticulosis.  Recommendations from GI doctor at [facility] patient's hemoglobin drops again to get a CTA abdomen pelvis and transfuse.  Keep hemoglobin above 7.  Patient is on a Protonix drip.    Initial hemoglobin 8.5, white blood cell

## 2024-02-29 NOTE — PLAN OF CARE
Plan of care:        Seen and examined by bedside. On 3 vasopressors and off sedation. Intubated. Alert, awake and responds to questions.     Sees Dr Little as his cardiologist and had a heart cath 3 years ago. Asked RN to obtain records and interrogate AICD.     In NSR on examination.Will follow

## 2024-02-29 NOTE — OP NOTE
PROCEDURE NOTE    DATE OF PROCEDURE: 2/29/2024    SURGEON: Airam Olivia MD  Facility : [facility]   ASSISTANT: None  Anesthesia: Monitored anesthesia care  PREOPERATIVE DIAGNOSIS: GI bleed    POSTOPERATIVE DIAGNOSIS: as described below    OPERATION: Total colonoscopy     ANESTHESIA: Moderate Sedation    ESTIMATED BLOOD LOSS: less than 50     COMPLICATIONS: None.     SPECIMENS:  Was Not Obtained    HISTORY: The patient is a 78 y.o. year old male with history of above preop diagnosis.  I recommended colonoscopy with possible biopsy or polypectomy and I explained the risk, benefits, expected outcome, and alternatives to the procedure.  Risks included but are not limited to bleeding, infection, respiratory distress, hypotension, and perforation of the colon and possibility of missing a lesion.  The patient understands and is in agreement.        PROCEDURE: The patient was given IV conscious sedation.  The patient's SPO2 remained above 90% throughout the procedure.     The colonoscope was inserted per rectum and advanced under direct vision to the descending colon without difficulty. At this point, patient lost his peripheral pulse and heart monitor did not showed asystole, therefore, the procedure was aborted and colonoscope was pulled out. CPR was initiated and patient was intubated by anesthesia team. Patient was successfully resuscitated.    Post sedation note :See above.        The prep was fair.      Findings:    Descending/Sigmoid colon: abnormal: severe diverticulosis    Rectum/Anus: examined in normal and retroflexed positions and was normal    Withdrawal Time was (minutes): 1    Diverticulosis: severe diverticulosis    The colon was decompressed and the scope was removed.  The patient tolerated the procedure well.     Impression: Aborted procedure due to intra procedural cardiac arrest S/P resuscitation  Severe colonic diverticulosis- likely cause of bleed    Recommendations/Plan:   Transfer to

## 2024-02-29 NOTE — PROCEDURES
Procedure: Right femoral central line placement    Physician: YUDY Watt    Performed emergently    Description:  Right femoral site prepped and draped in sterile fashion.  Vein accessed on first attempt.  Guidewire inserted without difficulty.  Catheter inserted over guidewire.  All 3 ports flush and draw easily.  Line sutured in place with sterile dressing applied.  Labs drawn for testing.  Estimated blood loss 10 mL including blood sent to the lab    No immediate complications    Paresh Watt MD  Pulmonary and Critical Care  [phone] [facility] [employer]  [phone] Cell

## 2024-03-01 ENCOUNTER — APPOINTMENT (OUTPATIENT)
Dept: GENERAL RADIOLOGY | Age: 79
DRG: 981 | End: 2024-03-01
Attending: INTERNAL MEDICINE
Payer: COMMERCIAL

## 2024-03-01 LAB
ALLEN TEST: ABNORMAL
ANION GAP SERPL CALCULATED.3IONS-SCNC: 15 MMOL/L (ref 9–17)
BASOPHILS # BLD: <0.03 K/UL (ref 0–0.2)
BASOPHILS NFR BLD: 0 % (ref 0–2)
BNP SERPL-MCNC: ABNORMAL PG/ML
BUN SERPL-MCNC: 36 MG/DL (ref 8–23)
CALCIUM SERPL-MCNC: 7.7 MG/DL (ref 8.6–10.4)
CHLORIDE SERPL-SCNC: 109 MMOL/L (ref 98–107)
CO2 SERPL-SCNC: 20 MMOL/L (ref 20–31)
CREAT SERPL-MCNC: 1.8 MG/DL (ref 0.7–1.2)
EKG ATRIAL RATE: 69 BPM
EKG P AXIS: 42 DEGREES
EKG P-R INTERVAL: 238 MS
EKG Q-T INTERVAL: 526 MS
EKG QRS DURATION: 128 MS
EKG QTC CALCULATION (BAZETT): 563 MS
EKG R AXIS: -23 DEGREES
EKG T AXIS: 109 DEGREES
EKG VENTRICULAR RATE: 69 BPM
EOSINOPHIL # BLD: <0.03 K/UL (ref 0–0.44)
EOSINOPHILS RELATIVE PERCENT: 0 % (ref 1–4)
ERYTHROCYTE [DISTWIDTH] IN BLOOD BY AUTOMATED COUNT: 18.6 % (ref 11.8–14.4)
FIO2: 60
FIO2: 60
GFR SERPL CREATININE-BSD FRML MDRD: 38 ML/MIN/1.73M2
GLUCOSE BLD-MCNC: 175 MG/DL (ref 75–110)
GLUCOSE BLD-MCNC: 196 MG/DL (ref 75–110)
GLUCOSE BLD-MCNC: 227 MG/DL (ref 75–110)
GLUCOSE BLD-MCNC: 228 MG/DL (ref 75–110)
GLUCOSE BLD-MCNC: 262 MG/DL (ref 75–110)
GLUCOSE BLD-MCNC: 269 MG/DL (ref 74–100)
GLUCOSE BLD-MCNC: 274 MG/DL (ref 75–110)
GLUCOSE BLD-MCNC: 289 MG/DL (ref 74–100)
GLUCOSE SERPL-MCNC: 290 MG/DL (ref 70–99)
HCT VFR BLD AUTO: 25.7 % (ref 40.7–50.3)
HCT VFR BLD AUTO: 25.8 % (ref 40.7–50.3)
HCT VFR BLD AUTO: 26 % (ref 40.7–50.3)
HCT VFR BLD AUTO: 27 % (ref 40.7–50.3)
HCT VFR BLD AUTO: 28.9 % (ref 40.7–50.3)
HGB BLD-MCNC: 8 G/DL (ref 13–17)
HGB BLD-MCNC: 8 G/DL (ref 13–17)
HGB BLD-MCNC: 8.3 G/DL (ref 13–17)
HGB BLD-MCNC: 8.7 G/DL (ref 13–17)
HGB BLD-MCNC: 9 G/DL (ref 13–17)
IMM GRANULOCYTES # BLD AUTO: 0.11 K/UL (ref 0–0.3)
IMM GRANULOCYTES NFR BLD: 1 %
LACTIC ACID, WHOLE BLOOD: 1 MMOL/L (ref 0.7–2.1)
LACTIC ACID, WHOLE BLOOD: 1.4 MMOL/L (ref 0.7–2.1)
LACTIC ACID, WHOLE BLOOD: 1.5 MMOL/L (ref 0.7–2.1)
LACTIC ACID, WHOLE BLOOD: 1.6 MMOL/L (ref 0.7–2.1)
LYMPHOCYTES NFR BLD: 1.26 K/UL (ref 1.1–3.7)
LYMPHOCYTES RELATIVE PERCENT: 11 % (ref 24–43)
MCH RBC QN AUTO: 28.8 PG (ref 25.2–33.5)
MCHC RBC AUTO-ENTMCNC: 32.2 G/DL (ref 28.4–34.8)
MCV RBC AUTO: 89.4 FL (ref 82.6–102.9)
MODE: ABNORMAL
MONOCYTES NFR BLD: 0.99 K/UL (ref 0.1–1.2)
MONOCYTES NFR BLD: 8 % (ref 3–12)
MRSA, DNA, NASAL: NEGATIVE
NEGATIVE BASE EXCESS, ART: 2.6 MMOL/L (ref 0–2)
NEGATIVE BASE EXCESS, ART: 3.9 MMOL/L (ref 0–2)
NEUTROPHILS NFR BLD: 80 % (ref 36–65)
NEUTS SEG NFR BLD: 9.35 K/UL (ref 1.5–8.1)
NRBC BLD-RTO: 0.2 PER 100 WBC
O2 DELIVERY DEVICE: ABNORMAL
PATIENT TEMP: 38.3
PATIENT TEMP: 38.3
PLATELET # BLD AUTO: 203 K/UL (ref 138–453)
PMV BLD AUTO: 9.8 FL (ref 8.1–13.5)
POC HCO3: 21.3 MMOL/L (ref 21–28)
POC HCO3: 21.7 MMOL/L (ref 21–28)
POC O2 SATURATION: 99.7 % (ref 94–98)
POC O2 SATURATION: 99.8 % (ref 94–98)
POC PCO2 TEMP: 34.4 MM HG
POC PCO2 TEMP: 42.8 MM HG
POC PCO2: 32.5 MM HG (ref 35–48)
POC PCO2: 40.4 MM HG (ref 35–48)
POC PH TEMP: 7.32
POC PH TEMP: 7.41
POC PH: 7.34 (ref 7.35–7.45)
POC PH: 7.42 (ref 7.35–7.45)
POC PO2 TEMP: 215.2 MM HG
POC PO2 TEMP: 228.5 MM HG
POC PO2: 208.6 MM HG (ref 83–108)
POC PO2: 222 MM HG (ref 83–108)
POTASSIUM SERPL-SCNC: 4.9 MMOL/L (ref 3.7–5.3)
RBC # BLD AUTO: 3.02 M/UL (ref 4.21–5.77)
RBC # BLD: ABNORMAL 10*6/UL
SAMPLE SITE: ABNORMAL
SODIUM SERPL-SCNC: 144 MMOL/L (ref 135–144)
SPECIMEN DESCRIPTION: NORMAL
SURGICAL PATHOLOGY REPORT: NORMAL
TROPONIN I SERPL HS-MCNC: 195 NG/L (ref 0–22)
TROPONIN I SERPL HS-MCNC: 199 NG/L (ref 0–22)
TROPONIN I SERPL HS-MCNC: 248 NG/L (ref 0–22)
TROPONIN I SERPL HS-MCNC: 257 NG/L (ref 0–22)
TROPONIN I SERPL HS-MCNC: 268 NG/L (ref 0–22)
TROPONIN I SERPL HS-MCNC: 273 NG/L (ref 0–22)
TROPONIN I SERPL HS-MCNC: 277 NG/L (ref 0–22)
TROPONIN I SERPL HS-MCNC: 288 NG/L (ref 0–22)
WBC OTHER # BLD: 11.7 K/UL (ref 3.5–11.3)

## 2024-03-01 PROCEDURE — 85014 HEMATOCRIT: CPT

## 2024-03-01 PROCEDURE — 6360000002 HC RX W HCPCS

## 2024-03-01 PROCEDURE — 80048 BASIC METABOLIC PNL TOTAL CA: CPT

## 2024-03-01 PROCEDURE — A4216 STERILE WATER/SALINE, 10 ML: HCPCS

## 2024-03-01 PROCEDURE — 36415 COLL VENOUS BLD VENIPUNCTURE: CPT

## 2024-03-01 PROCEDURE — 71045 X-RAY EXAM CHEST 1 VIEW: CPT

## 2024-03-01 PROCEDURE — 84484 ASSAY OF TROPONIN QUANT: CPT

## 2024-03-01 PROCEDURE — 94003 VENT MGMT INPAT SUBQ DAY: CPT

## 2024-03-01 PROCEDURE — 85025 COMPLETE CBC W/AUTO DIFF WBC: CPT

## 2024-03-01 PROCEDURE — 82803 BLOOD GASES ANY COMBINATION: CPT

## 2024-03-01 PROCEDURE — 99291 CRITICAL CARE FIRST HOUR: CPT | Performed by: INTERNAL MEDICINE

## 2024-03-01 PROCEDURE — 94761 N-INVAS EAR/PLS OXIMETRY MLT: CPT

## 2024-03-01 PROCEDURE — 2500000003 HC RX 250 WO HCPCS

## 2024-03-01 PROCEDURE — 6370000000 HC RX 637 (ALT 250 FOR IP)

## 2024-03-01 PROCEDURE — 99223 1ST HOSP IP/OBS HIGH 75: CPT | Performed by: INTERNAL MEDICINE

## 2024-03-01 PROCEDURE — APPNB180 APP NON BILLABLE TIME > 60 MINS: Performed by: NURSE PRACTITIONER

## 2024-03-01 PROCEDURE — C9113 INJ PANTOPRAZOLE SODIUM, VIA: HCPCS

## 2024-03-01 PROCEDURE — 83605 ASSAY OF LACTIC ACID: CPT

## 2024-03-01 PROCEDURE — 82947 ASSAY GLUCOSE BLOOD QUANT: CPT

## 2024-03-01 PROCEDURE — 87040 BLOOD CULTURE FOR BACTERIA: CPT

## 2024-03-01 PROCEDURE — 2580000003 HC RX 258

## 2024-03-01 PROCEDURE — 2700000000 HC OXYGEN THERAPY PER DAY

## 2024-03-01 PROCEDURE — 37799 UNLISTED PX VASCULAR SURGERY: CPT

## 2024-03-01 PROCEDURE — 2000000000 HC ICU R&B

## 2024-03-01 PROCEDURE — 85018 HEMOGLOBIN: CPT

## 2024-03-01 PROCEDURE — 83880 ASSAY OF NATRIURETIC PEPTIDE: CPT

## 2024-03-01 RX ADMIN — ACETAMINOPHEN 650 MG: 325 TABLET ORAL at 00:29

## 2024-03-01 RX ADMIN — PANTOPRAZOLE SODIUM 40 MG: 40 INJECTION, POWDER, FOR SOLUTION INTRAVENOUS at 21:15

## 2024-03-01 RX ADMIN — VASOPRESSIN 0.03 UNITS/MIN: 0.2 INJECTION INTRAVENOUS at 07:54

## 2024-03-01 RX ADMIN — Medication 20 MCG/MIN: at 05:00

## 2024-03-01 RX ADMIN — INSULIN LISPRO 2 UNITS: 100 INJECTION, SOLUTION INTRAVENOUS; SUBCUTANEOUS at 08:53

## 2024-03-01 RX ADMIN — SODIUM CHLORIDE, PRESERVATIVE FREE 10 ML: 5 INJECTION INTRAVENOUS at 21:15

## 2024-03-01 RX ADMIN — INSULIN LISPRO 2 UNITS: 100 INJECTION, SOLUTION INTRAVENOUS; SUBCUTANEOUS at 11:47

## 2024-03-01 RX ADMIN — ACETAMINOPHEN 650 MG: 325 TABLET ORAL at 11:33

## 2024-03-01 RX ADMIN — SODIUM CHLORIDE, PRESERVATIVE FREE 10 ML: 5 INJECTION INTRAVENOUS at 08:54

## 2024-03-01 RX ADMIN — PANTOPRAZOLE SODIUM 40 MG: 40 INJECTION, POWDER, FOR SOLUTION INTRAVENOUS at 08:53

## 2024-03-01 ASSESSMENT — PULMONARY FUNCTION TESTS
PIF_VALUE: 25
PIF_VALUE: 29
PIF_VALUE: 16
PIF_VALUE: 30
PIF_VALUE: 28
PIF_VALUE: 18
PIF_VALUE: 30
PIF_VALUE: 16
PIF_VALUE: 15

## 2024-03-01 NOTE — PROCEDURES
PROCEDURE NOTE - ARTERIAL LINE PLACEMENT    PATIENT NAME: Kb Wilson  MEDICAL RECORD NO. [id number]  DATE: 2/29/2024  ATTENDING PHYSICIAN:  .     PREOPERATIVE DIAGNOSIS:  Need for blood pressure monitoring  POSTOPERATIVE DIAGNOSIS:  Same  PROCEDURE PERFORMED: Right Radial Arterial Line Insertion  PERFORMING PHYSICIAN:  Farhad Segal MD  ESTIMATED BLOOD LOSS:  Less than 25 ml  COMPLICATIONS:  None immediately appreciated.     DISCUSSION:  Kb Wilson is a 78 y.o. male who requires invasive pressure monitoring. The history and physical examination were reviewed and confirmed.      CONSENT: Unable to be obtained due to the emergent nature of this procedure. Patient's Son was informed about procedure, all questions were answered to satisfaction and he agreed to proceed with art line placement.    INDICATION : Accurate Blood pressure monitoring. Blood Samples.     PROCEDURE:  A timeout was initiated by the bedside nurse and was confirmed by those present.  The patient was placed in a supine position. The skin overlying the Right Radial was prepped with chlorhexadine. Through this region, the introducer needle through catheter was inserted into right radial artery until pulsatile bright blood was seen in collection tubing. Guidewire was advanced with no resistance. Catheter was advanced into the artery, wire was pulled with brisk bleeding noted. Pressure monitoring setup was connected to the catheter, it aspirated and flushed easily. The catheter was secured to the wrist with 3-0 silk.     No immediate complication was evident.  All sponge, instrument and needle counts were correct at the completion of the procedure.    Electronically signed by Farhad Segal MD on 2/29/2024 at 8:18 PM    Farhad Segal MD  Internal Medicine Resident, PGY- 2  [facility],  [place], [place].  8:18 PM     This note is created with the assistance of a speech-recognition program. While intending to  generate a document that actually reflects the content of the visit, no guarantees can be provided that every mistake has been identified and corrected by editing.

## 2024-03-01 NOTE — PROGRESS NOTES
03/01/24 0747   Vent Information   Vent Mode (S)  CPAP/PS   Ventilator Settings   PEEP/CPAP (cmH2O) (S)  10   FiO2  (S)  40 %   Pressure Support (cm H2O) (S)  8 cm H2O

## 2024-03-01 NOTE — PROGRESS NOTES
03/01/24 0434   Ventilator Settings   Vt (Set, mL) (S)  670 mL   Resp Rate (Set) (S)  18 bpm   PEEP/CPAP (cmH2O) (S)  10   FiO2  (S)  60 %     Vent changes adjust to pt 8ml/kg. Will obtain abg in 30mins. Dr. Carpenter informed.

## 2024-03-01 NOTE — CONSULTS
[facility]  Consultation Note     .  Chief Complaint:  Rectal bleeding    Reason for consult:    GI bleed  Resume anticoagulation after GI bleed    History of present illness:   HPI taken from staff and chart as patient is currently intubated, able to talk but communication is limited  This is a 78 y.o. male with PMH including CAD s/p CABG on Plavix, CHF with a EF of 20% s/p AICD, paroxysmal A-fib on Eliquis, DM type II, HTN, CKD who originally presented to [facility] for bright red blood per rectum.  Patient was seen per GI who plan for colonoscopy because EGD identified a large diverticula noted in the third part of the duodenum but no active bleeding noted.    During the colonoscopy patient cardiac arrested total of 3 times which she was intubated and then transferred to ICU where he coded 2 more times.  He was transferred to [facility] for higher level of care including cardiac evaluation    Patient was able to have partial colonoscopy done that showed severe diverticula consistent with diverticulosis.  Plans at that time were for if patient rebleeds obtain CTA abdomen and pelvis, maintain hemoglobin greater than 7 and continue Protonix drip.     Upon arrival to [facility] today patient was intubated, on 2 pressors, able to mouth a few words over ET tube.  No rectal bleeding noted.  Abdomen was soft and nondistended    Currently hemoglobin is 8.3 g/dL, WBCs 11.7 iron sat 47%,  BMP shows BUN of 36, creatinine 1.8, high-sensitivity troponin 257  LFTs yesterday show ALT of 126, , INR 1.2    Previous GI history:   2/28/2024 EGD  Impression:    Erythematous gastropathy, biopsies obtained.  A large diverticula noted in 2nd-3rd part of the duodenum   No active GI bleeding noted throughout the upper endoscopy.     Recommendations:  The likely source of GI bleed is lower.  Will start the patient on a bowel prep with plans for colonoscopy the next day, if the patient is active GI bleed,  obtain bleeding scan vs CTA abdomen/pelvis and consider transfer for IR intervention  Trend H&H and keep hemoglobin > 7  Protonix 40 mg twice daily  Past Medical/Social/Family History:  Past Medical History:   Diagnosis Date    CAD (coronary artery disease)     CHF (congestive heart failure) (HCC)     Diabetes mellitus (HCC)     Hypertension      Past Surgical History:   Procedure Laterality Date    CARDIAC DEFIBRILLATOR PLACEMENT      COLONOSCOPY N/A 2/29/2024    COLONOSCOPY DIAGNOSTIC ABORTED performed by Airam Olivia MD at [facility] OR    CORONARY ARTERY BYPASS GRAFT      UPPER GASTROINTESTINAL ENDOSCOPY N/A 2/28/2024    ESOPHAGOGASTRODUODENOSCOPY BIOPSY performed by Chas Molina MD at [facility] OR     No family history on file.  Previous records/history/ and notes were reviewed    Allergies:  Allergies   Allergen Reactions    Barium-Containing Compounds     Iodinated Contrast Media     Hydromorphone Hallucinations     weird dreams       Home medications:  Prior to Admission medications    Medication Sig Start Date End Date Taking? Authorizing Provider   carvedilol (COREG) 3.125 MG tablet Take 1 tablet by mouth 2 times daily (with meals) 2/25/24   Marlena Hedrick MD   aspirin 81 MG chewable tablet Take 1 tablet by mouth daily  Patient not taking: Reported on 2/27/2024 2/26/24   Marlena Hedrick MD   atorvastatin (LIPITOR) 80 MG tablet Take 1 tablet by mouth nightly 2/25/24   Marlena Hedrick MD   calcitRIOL (ROCALTROL) 0.25 MCG capsule Take 1 capsule by mouth as needed Three times weekly 7/8/23   Laura More MD   apixaban (ELIQUIS) 5 MG TABS tablet Take 1 tablet by mouth 2 times daily 7/8/23   Laura More MD   famotidine (PEPCID) 40 MG tablet Take 1 tablet by mouth daily 7/8/23   Laura More MD   OZEMPIC, 0.25 OR 0.5 MG/DOSE, 2 MG/3ML SOPN  1/3/24   Laura More MD   insulin glargine (LANTUS;BASAGLAR) 100 UNIT/ML injection pen Inject 40 Units into the skin daily  (HCC)  Resolved Problems:    * No resolved hospital problems. *       GI Impression:    Suspected diverticular bleeding-resolved.  Patient coded multiple times during colonoscopy which was only able to be partially completed that showed very large abnormal diverticula suspicious for diverticular bleeding.  Procedure had to be aborted because patient coded 3 times required intubation and then transferred to ICU.  No further episodes of bleeding since arrival  Patient awaiting cardiology consultation    Recommendations and plan:    Given lack of overt bleeding and recent cardiac events with defer colonoscopy at this time  Nutrition per primary  Continue supportive care  Maintain MAP greater than 65  Daily labs.  Trend hemoglobin.  Transfuse as needed  GI will follow    This plan was formulated in collaboration with Dr. Amos MD  Please feel free to contact us with any questions or concerns      [facility]  Lawson Junior, ELIAS - CNP   [phone]  3/1/2024    2:37 PM     Estimated time of 60 mins reviewing chart, assessing patient and formulating plan of care    This note was created with the assistance of a speech-recognition program.  Although the intention is to generate a document that actually reflects the content of the visit, no guarantees can be provided that every mistake has been identified and corrected by editing.

## 2024-03-01 NOTE — PROGRESS NOTES
Order obtained for extubation.  SpO2 of 98 on 30% FiO2.   Patient extubated and placed on 4 liters/min via nasal cannula.   Post extubation SpO2 is 95% with HR  81 bpm and RR 18 breaths/min.    Patient had moderate cough that was productive of yellow and green sputum.  Extubation Well tolerated by patient..   Breath Sounds: clear    Izabella Payne RCP   12:52 PM

## 2024-03-01 NOTE — PROGRESS NOTES
03/01/24 1022   Vent Information   Vent Mode (S)  CPAP/PS   Ventilator Settings   PEEP/CPAP (cmH2O) (S)  8   FiO2  (S)  40 %   Pressure Support (cm H2O) (S)  8 cm H2O

## 2024-03-01 NOTE — PROGRESS NOTES
03/01/24 0509   Ventilator Settings   Vt (Set, mL) (S)  670 mL   Resp Rate (Set) (S)  18 bpm   PEEP/CPAP (cmH2O) (S)  10   FiO2  (S)  40 %     Per abg results. Dr. Carpenter informed.

## 2024-03-01 NOTE — PROGRESS NOTES
2031h- called [employer] & spoke to Edyta regarding AICD interrogation, she will call the representative on call today.

## 2024-03-01 NOTE — CONSULTS
[facility]    Consult                Today's Date: 3/1/2024  Patient Name: Kb Wilson  Date of admission: 2/29/2024  3:40 PM  Patient's age: 78 y.o., 1945  Admission Dx: Cardiac arrest (HCC) [I46.9]    Reason for Consult:  Cardiac evaluation of cardiac arrest    Requesting Physician: Judson Cortés MD    CHIEF COMPLAINT:  GI bleed     History Obtained From:  electronic medical record    HISTORY OF PRESENT ILLNESS:      The patient is a 78 y.o.  male with PMH significant of CAD s/p CABG, ischemic cardiomyopathy (EF 25% s/p AICD), paroxysmal A-fib (on Eliquis), DM, hypertension, admitted at [facility] due to concern of lower GI bleed.  CT abdomen showed extensive diverticulosis with possible sigmoid colon wall thickening.  GI was consulted, started on IV antibiotics and PPI.  He underwent EGD which was unremarkable.  GI recommended bowel prep for colonoscopy.  Cardiology cleared for colonoscopy at moderate risk.  He was taken to the OR for colonoscopy.  During the procedure, patient lost his pulses and procedure was aborted.  CPR was started, he was intubated, ROSC was achieved.  And the patient was brought back to the floor under ICU status, where he coded 2 more times.  Colonoscopy showed severe diverticulosis, likely cause of bleed.  GI recommended patient to be transferred to [facility] for further evaluation and management.  Chest x-ray was concerning for vascular congestion.  On the floor, patient coded again, asystole/PEA.  CPR was started, and eventually ROSC was achieved.  Life flight team was contacted and he was transferred to [facility] for further evaluation and management.    Past Medical History:   has a past medical history of CAD (coronary artery disease), CHF (congestive heart failure) (HCC), Diabetes mellitus (HCC), and Hypertension.    Past Surgical History:   has a past surgical history that includes Cardiac defibrillator  placement; Coronary artery bypass graft; and Upper gastrointestinal endoscopy (N/A, 2/28/2024).     Home Medications:    Prior to Admission medications    Medication Sig Start Date End Date Taking? Authorizing Provider   carvedilol (COREG) 3.125 MG tablet Take 1 tablet by mouth 2 times daily (with meals) 2/25/24   Marlena Hedrick MD   aspirin 81 MG chewable tablet Take 1 tablet by mouth daily  Patient not taking: Reported on 2/27/2024 2/26/24   Marlena Hedrick MD   atorvastatin (LIPITOR) 80 MG tablet Take 1 tablet by mouth nightly 2/25/24   Marlena Hedrick MD   calcitRIOL (ROCALTROL) 0.25 MCG capsule Take 1 capsule by mouth as needed Three times weekly 7/8/23   Laura More MD   apixaban (ELIQUIS) 5 MG TABS tablet Take 1 tablet by mouth 2 times daily 7/8/23   Laura More MD   famotidine (PEPCID) 40 MG tablet Take 1 tablet by mouth daily 7/8/23   Laura More MD   OZEMPIC, 0.25 OR 0.5 MG/DOSE, 2 MG/3ML SOPN  1/3/24   Laura More MD   insulin glargine (LANTUS;BASAGLAR) 100 UNIT/ML injection pen Inject 40 Units into the skin daily (with breakfast) 2/8/24   Laura More MD   dapagliflozin (FARXIGA) 10 MG tablet Take 1 tablet by mouth daily 12/7/23   Laura More MD   losartan (COZAAR) 25 MG tablet Take 1 tablet by mouth daily 4/7/23   Laura More MD   mirtazapine (REMERON) 7.5 MG tablet Take 1 tablet by mouth nightly 2/18/23   Laura More MD   torsemide (DEMADEX) 20 MG tablet Take 1 tablet by mouth daily 1/19/24   Laura More MD      Current Facility-Administered Medications: EPINEPHrine 5 mg in sodium chloride 0.9 % 250 mL infusion, 1-20 mcg/min, IntraVENous, Continuous  sodium chloride flush 0.9 % injection 5-40 mL, 5-40 mL, IntraVENous, 2 times per day  sodium chloride flush 0.9 % injection 5-40 mL, 5-40 mL, IntraVENous, PRN  0.9 % sodium chloride infusion, , IntraVENous, PRN  potassium chloride 20 mEq/50 mL IVPB (Central Line), 20  loss. There's been No change in energy level, No change in activity level.     Eyes: No visual changes or diplopia. No scleral icterus.  ENT: No Headaches  Cardiovascular: No cardiac history  Respiratory: No previous pulmonary problems, No cough  Gastrointestinal: No abdominal pain.  No change in bowel or bladder habits.  Genitourinary: No dysuria, trouble voiding, or hematuria.  Musculoskeletal:  No gait disturbance, No weakness or joint complaints.  Integumentary: No rash or pruritis.  Neurological: No headache, diplopia, change in muscle strength, numbness or tingling. No change in gait, balance, coordination, mood, affect, memory, mentation, behavior.  Psychiatric: No anxiety, or depression.  Endocrine: No temperature intolerance. No excessive thirst, fluid intake, or urination. No tremor.  Hematologic/Lymphatic: No abnormal bruising or bleeding, blood clots or swollen lymph nodes.  Allergic/Immunologic: No nasal congestion or hives.      PHYSICAL EXAM:      /61   Pulse 70   Temp 100.4 °F (38 °C)   Resp 16   Wt 125.5 kg (276 lb 11.2 oz)   SpO2 100%   BMI 34.59 kg/m²    Constitutional and General Appearance: alert, cooperative, no distress and appears stated age  HEENT: PERRL, no cervical lymphadenopathy. No masses palpable. Normal oral mucosa  Respiratory:  Normal excursion and expansion without use of accessory muscles  Resp Auscultation: Good respiratory effort. No for increased work of breathing. On auscultation: clear to auscultation bilaterally  Cardiovascular:  The apical impulse is not displaced  Heart tones are crisp and normal. regular S1 and S2.  Jugular venous pulsation Normal  The carotid upstroke is normal in amplitude and contour without delay or bruit  Peripheral pulses are symmetrical and full   Abdomen:   No masses or tenderness  Bowel sounds present  Extremities:   No Cyanosis or Clubbing   Lower extremity edema: No   Skin: Warm and dry  Neurological:  Alert and oriented.  Moves all

## 2024-03-01 NOTE — PROGRESS NOTES
[facility]   Department of Internal Medicine & Critical Care - Staff Internal Medicine Teaching Service     Critical Care - Daily Progress Note      Date and time: 3/1/2024 6:57 AM  Patient's name:  Kb Wilson  Medical Record Number: [id number]  Patient's account/billing number: [id number]  Patient's YOB: 1945  Age: 78 y.o.  Date of Admission: 2/29/2024  3:40 PM  Length of stay during current admission: 1  Primary Care Physician: Viridiana Taveras  ICU Attending Physician: Judson Cortés MD    Code Status: Full Code  Reason for ICU admission: cardiac arrest   Subjective:     Pt was seen and examined at bedside.  Intubated, sedated and mechanically ventilated.  Vitals stable.  Labs reviewed.    OVERNIGHT EVENTS:           Troponin 67 -->233 --> 268 --> 273  Urine output is 1 L over 24 hours  Hb stable,no signs of bleeding  GI consult for GI bleed      CURRENT VENTILATION STATUS:  Mechanical Ventilation     SEDATION AND ANALGESIA:   Fentanyl and Versed    PARALYZED: No    VASOPRESSORS:   Norepinephrine at 15 and Vasopressin at 0.03    URINE OUTPUT:   [x] Good  [] Low  [] Anuric      CONSULT SERVICES: CARDIOLOGY    BRIEF SUMMARY:    Kb Wilson is a 78 y.o. with a history of:  CAD status post CABG on Plavix  CHF with ejection fraction 20% has AICD  Paroxysmal A-fib on Eliquis  Diabetes mellitus  Hypertension  Stage III kidney disease     Patient presented to [place] emergency department on 2/27/2024 for bright red blood when he wiped when going to the bathroom just prior to presenting to the emergency room.  Concern for GI bleed.  CT abdomen pelvis without contrast suggest diverticulosis.  Patient was admitted for GI consult was started on Protonix infusion.  Patient had EGD that was normal.  Patient was prepped for colonoscopy today.  Patient was given propofol and the colonoscopy was about to begin when he cardiac arrested.  Cardiac arrested a

## 2024-03-01 NOTE — PROGRESS NOTES
2147H- AICD interrogated by Cesar (company representative), print out summary kept on patient's file.

## 2024-03-01 NOTE — PROGRESS NOTES
SPIRITUAL CARE DEPARTMENT - [facility]  PROGRESS NOTE    Room # 3003/3003-01   Name: Kb Wilson              Reason for visit: Routine    I visited the patient.    Admit Date & Time: 2/29/2024  3:40 PM    Assessment:  bK Wilson is a 78 y.o. male.  asked to follow up . Follow up visit. Upon entering the room patient is intubated, yet very responsive. Family states about PT:'s  medical condition, states struggles with their medical situation. States worries, fears frustrations.Family states  Patient is well , treated well. Patient has good family support. RN and family states PT: is more stable. Possible extubation tomorrow.     Intervention:   provided a ministry presence, listening, support for the family.     Outcome:  Patient family open to visit.     Plan:  Chaplains will remain available to offer spiritual and emotional support as needed.    Electronically signed by Chaplain Keara, on 2/29/2024 at 9:33 PM.  Spiritual Care Department  [facility]      02/29/24 2131   Encounter Summary   Encounter Overview/Reason  Crisis   Service Provided For: Patient and family together   Referral/Consult From: Other    Support System Family members   Last Encounter  02/29/24   Complexity of Encounter High   Begin Time 0640   End Time  0655   Total Time Calculated 15 min   Spiritual/Emotional needs   Type Spiritual Support   Assessment/Intervention/Outcome   Assessment Calm;Coping;Hopeful   Intervention Active listening;Discussed illness injury and it’s impact;Discussed belief system/Tenriism practices/clari;Sustaining Presence/Ministry of presence   Outcome Engaged in conversation;Expressed feelings, needs, and concerns;Expressed Gratitude;Receptive

## 2024-03-01 NOTE — PLAN OF CARE
Problem: Safety - Medical Restraint  Goal: Remains free of injury from restraints (Restraint for Interference with Medical Device)  Description: INTERVENTIONS:  1. Determine that other, less restrictive measures have been tried or would not be effective before applying the restraint  2. Evaluate the patient's condition at the time of restraint application  3. Inform patient/family regarding the reason for restraint  4. Q2H: Monitor safety, psychosocial status, comfort, nutrition and hydration  2/29/2024 1949 by Florence Mcgregor RN  Outcome: Progressing  2/29/2024 1843 by Chapincito Haddad RN  Outcome: Progressing  Flowsheets  Taken 2/29/2024 1800  Remains free of injury from restraints (restraint for interference with medical device):   Determine that other, less restrictive measures have been tried or would not be effective before applying the restraint   Every 2 hours: Monitor safety, psychosocial status, comfort, nutrition and hydration  Taken 2/29/2024 1600  Remains free of injury from restraints (restraint for interference with medical device):   Determine that other, less restrictive measures have been tried or would not be effective before applying the restraint   Every 2 hours: Monitor safety, psychosocial status, comfort, nutrition and hydration  Taken 2/29/2024 1551  Remains free of injury from restraints (restraint for interference with medical device):   Determine that other, less restrictive measures have been tried or would not be effective before applying the restraint   Every 2 hours: Monitor safety, psychosocial status, comfort, nutrition and hydration     Problem: Chronic Conditions and Co-morbidities  Goal: Patient's chronic conditions and co-morbidity symptoms are monitored and maintained or improved  2/29/2024 1949 by Florence Mcgregor RN  Outcome: Progressing  2/29/2024 1843 by Chapincito Haddad RN  Outcome: Progressing  Flowsheets (Taken 2/29/2024 1538)  Care Plan - Patient's Chronic  RN  Outcome: Progressing  2/29/2024 1843 by Chapincito Haddad RN  Outcome: Progressing     Problem: ABCDS Injury Assessment  Goal: Absence of physical injury  Outcome: Progressing  Flowsheets (Taken 2/29/2024 1842 by Chapicnito Haddad, RN)  Absence of Physical Injury: Implement safety measures based on patient assessment

## 2024-03-01 NOTE — PLAN OF CARE
Problem: Safety - Medical Restraint  Goal: Remains free of injury from restraints (Restraint for Interference with Medical Device)  Description: INTERVENTIONS:  1. Determine that other, less restrictive measures have been tried or would not be effective before applying the restraint  2. Evaluate the patient's condition at the time of restraint application  3. Inform patient/family regarding the reason for restraint  4. Q2H: Monitor safety, psychosocial status, comfort, nutrition and hydration  Outcome: Completed  Flowsheets  Taken 3/1/2024 1200 by Chapincito Haddad RN  Remains free of injury from restraints (restraint for interference with medical device):   Determine that other, less restrictive measures have been tried or would not be effective before applying the restraint   Every 2 hours: Monitor safety, psychosocial status, comfort, nutrition and hydration  Taken 3/1/2024 1000 by Chapincito Haddad RN  Remains free of injury from restraints (restraint for interference with medical device):   Determine that other, less restrictive measures have been tried or would not be effective before applying the restraint   Every 2 hours: Monitor safety, psychosocial status, comfort, nutrition and hydration  Taken 3/1/2024 0800 by Chapincito Haddad RN  Remains free of injury from restraints (restraint for interference with medical device):   Determine that other, less restrictive measures have been tried or would not be effective before applying the restraint   Every 2 hours: Monitor safety, psychosocial status, comfort, nutrition and hydration  Taken 3/1/2024 0600 by Florence Mcgregor RN  Remains free of injury from restraints (restraint for interference with medical device): Every 2 hours: Monitor safety, psychosocial status, comfort, nutrition and hydration  Taken 3/1/2024 0400 by Florence Mcgregor RN  Remains free of injury from restraints (restraint for interference with medical device): Every 2 hours: Monitor  safety, psychosocial status, comfort, nutrition and hydration  Taken 3/1/2024 0200 by Florence Mcgregor RN  Remains free of injury from restraints (restraint for interference with medical device): Every 2 hours: Monitor safety, psychosocial status, comfort, nutrition and hydration  Taken 3/1/2024 0000 by Florence Mcgregor RN  Remains free of injury from restraints (restraint for interference with medical device): Every 2 hours: Monitor safety, psychosocial status, comfort, nutrition and hydration

## 2024-03-01 NOTE — PROGRESS NOTES
03/01/24 0751   Vent Information   Vent Mode (S)  AC/PRVC       Placed back on full support due to low RR.

## 2024-03-02 PROBLEM — D62 ACUTE BLOOD LOSS ANEMIA: Status: ACTIVE | Noted: 2024-03-02

## 2024-03-02 PROBLEM — I21.4 NSTEMI (NON-ST ELEVATED MYOCARDIAL INFARCTION) (HCC): Status: ACTIVE | Noted: 2024-03-02

## 2024-03-02 PROBLEM — K57.31 DIVERTICULAR HEMORRHAGE: Status: ACTIVE | Noted: 2024-03-02

## 2024-03-02 LAB
ANION GAP SERPL CALCULATED.3IONS-SCNC: 9 MMOL/L (ref 9–17)
BASOPHILS # BLD: <0.03 K/UL (ref 0–0.2)
BASOPHILS NFR BLD: 0 % (ref 0–2)
BUN SERPL-MCNC: 31 MG/DL (ref 8–23)
CALCIUM SERPL-MCNC: 7.9 MG/DL (ref 8.6–10.4)
CHLORIDE SERPL-SCNC: 109 MMOL/L (ref 98–107)
CO2 SERPL-SCNC: 24 MMOL/L (ref 20–31)
CREAT SERPL-MCNC: 1.6 MG/DL (ref 0.7–1.2)
EOSINOPHIL # BLD: 0.03 K/UL (ref 0–0.44)
EOSINOPHILS RELATIVE PERCENT: 0 % (ref 1–4)
ERYTHROCYTE [DISTWIDTH] IN BLOOD BY AUTOMATED COUNT: 19.2 % (ref 11.8–14.4)
GFR SERPL CREATININE-BSD FRML MDRD: 44 ML/MIN/1.73M2
GLUCOSE BLD-MCNC: 172 MG/DL (ref 75–110)
GLUCOSE BLD-MCNC: 212 MG/DL (ref 75–110)
GLUCOSE BLD-MCNC: 253 MG/DL (ref 75–110)
GLUCOSE BLD-MCNC: 267 MG/DL (ref 75–110)
GLUCOSE SERPL-MCNC: 190 MG/DL (ref 70–99)
HCT VFR BLD AUTO: 25.7 % (ref 40.7–50.3)
HCT VFR BLD AUTO: 26.2 % (ref 40.7–50.3)
HCT VFR BLD AUTO: 30.4 % (ref 40.7–50.3)
HGB BLD-MCNC: 7.8 G/DL (ref 13–17)
HGB BLD-MCNC: 7.8 G/DL (ref 13–17)
HGB BLD-MCNC: 8.3 G/DL (ref 13–17)
IMM GRANULOCYTES # BLD AUTO: 0.05 K/UL (ref 0–0.3)
IMM GRANULOCYTES NFR BLD: 1 %
LACTIC ACID, WHOLE BLOOD: 1 MMOL/L (ref 0.7–2.1)
LYMPHOCYTES NFR BLD: 1.27 K/UL (ref 1.1–3.7)
LYMPHOCYTES RELATIVE PERCENT: 13 % (ref 24–43)
MCH RBC QN AUTO: 28.6 PG (ref 25.2–33.5)
MCHC RBC AUTO-ENTMCNC: 30.4 G/DL (ref 28.4–34.8)
MCV RBC AUTO: 94.1 FL (ref 82.6–102.9)
MONOCYTES NFR BLD: 0.72 K/UL (ref 0.1–1.2)
MONOCYTES NFR BLD: 8 % (ref 3–12)
NEUTROPHILS NFR BLD: 78 % (ref 36–65)
NEUTS SEG NFR BLD: 7.37 K/UL (ref 1.5–8.1)
NRBC BLD-RTO: 0.2 PER 100 WBC
PLATELET # BLD AUTO: 154 K/UL (ref 138–453)
PMV BLD AUTO: 10.2 FL (ref 8.1–13.5)
POTASSIUM SERPL-SCNC: 4.7 MMOL/L (ref 3.7–5.3)
RBC # BLD AUTO: 2.73 M/UL (ref 4.21–5.77)
RBC # BLD: ABNORMAL 10*6/UL
SODIUM SERPL-SCNC: 142 MMOL/L (ref 135–144)
TROPONIN I SERPL HS-MCNC: 223 NG/L (ref 0–22)
TROPONIN I SERPL HS-MCNC: 319 NG/L (ref 0–22)
TROPONIN I SERPL HS-MCNC: 364 NG/L (ref 0–22)
TROPONIN I SERPL HS-MCNC: 419 NG/L (ref 0–22)
WBC OTHER # BLD: 9.5 K/UL (ref 3.5–11.3)

## 2024-03-02 PROCEDURE — 99232 SBSQ HOSP IP/OBS MODERATE 35: CPT | Performed by: INTERNAL MEDICINE

## 2024-03-02 PROCEDURE — 6360000002 HC RX W HCPCS

## 2024-03-02 PROCEDURE — 2500000003 HC RX 250 WO HCPCS

## 2024-03-02 PROCEDURE — 2000000000 HC ICU R&B

## 2024-03-02 PROCEDURE — 36415 COLL VENOUS BLD VENIPUNCTURE: CPT

## 2024-03-02 PROCEDURE — 84484 ASSAY OF TROPONIN QUANT: CPT

## 2024-03-02 PROCEDURE — 2580000003 HC RX 258

## 2024-03-02 PROCEDURE — 6370000000 HC RX 637 (ALT 250 FOR IP)

## 2024-03-02 PROCEDURE — 85018 HEMOGLOBIN: CPT

## 2024-03-02 PROCEDURE — 83605 ASSAY OF LACTIC ACID: CPT

## 2024-03-02 PROCEDURE — 99291 CRITICAL CARE FIRST HOUR: CPT | Performed by: INTERNAL MEDICINE

## 2024-03-02 PROCEDURE — 2700000000 HC OXYGEN THERAPY PER DAY

## 2024-03-02 PROCEDURE — 85014 HEMATOCRIT: CPT

## 2024-03-02 PROCEDURE — 94761 N-INVAS EAR/PLS OXIMETRY MLT: CPT

## 2024-03-02 PROCEDURE — C9113 INJ PANTOPRAZOLE SODIUM, VIA: HCPCS

## 2024-03-02 PROCEDURE — 99233 SBSQ HOSP IP/OBS HIGH 50: CPT | Performed by: INTERNAL MEDICINE

## 2024-03-02 PROCEDURE — 85025 COMPLETE CBC W/AUTO DIFF WBC: CPT

## 2024-03-02 PROCEDURE — A4216 STERILE WATER/SALINE, 10 ML: HCPCS

## 2024-03-02 PROCEDURE — APPSS45 APP SPLIT SHARED TIME 31-45 MINUTES: Performed by: INTERNAL MEDICINE

## 2024-03-02 PROCEDURE — 82947 ASSAY GLUCOSE BLOOD QUANT: CPT

## 2024-03-02 PROCEDURE — 80048 BASIC METABOLIC PNL TOTAL CA: CPT

## 2024-03-02 RX ORDER — LIDOCAINE 4 G/G
1 PATCH TOPICAL DAILY
Status: COMPLETED | OUTPATIENT
Start: 2024-03-02 | End: 2024-03-03

## 2024-03-02 RX ORDER — MIDODRINE HYDROCHLORIDE 5 MG/1
10 TABLET ORAL
Status: DISCONTINUED | OUTPATIENT
Start: 2024-03-03 | End: 2024-03-15 | Stop reason: HOSPADM

## 2024-03-02 RX ORDER — MIDODRINE HYDROCHLORIDE 5 MG/1
5 TABLET ORAL ONCE
Status: COMPLETED | OUTPATIENT
Start: 2024-03-02 | End: 2024-03-02

## 2024-03-02 RX ORDER — MIDODRINE HYDROCHLORIDE 5 MG/1
5 TABLET ORAL
Status: DISCONTINUED | OUTPATIENT
Start: 2024-03-02 | End: 2024-03-02

## 2024-03-02 RX ORDER — NOREPINEPHRINE BITARTRATE 0.06 MG/ML
1-100 INJECTION, SOLUTION INTRAVENOUS CONTINUOUS
Status: DISCONTINUED | OUTPATIENT
Start: 2024-03-02 | End: 2024-03-07 | Stop reason: SDUPTHER

## 2024-03-02 RX ADMIN — SODIUM CHLORIDE, PRESERVATIVE FREE 10 ML: 5 INJECTION INTRAVENOUS at 08:02

## 2024-03-02 RX ADMIN — INSULIN LISPRO 2 UNITS: 100 INJECTION, SOLUTION INTRAVENOUS; SUBCUTANEOUS at 16:03

## 2024-03-02 RX ADMIN — PANTOPRAZOLE SODIUM 40 MG: 40 INJECTION, POWDER, FOR SOLUTION INTRAVENOUS at 20:33

## 2024-03-02 RX ADMIN — PANTOPRAZOLE SODIUM 40 MG: 40 INJECTION, POWDER, FOR SOLUTION INTRAVENOUS at 08:01

## 2024-03-02 RX ADMIN — SODIUM CHLORIDE, PRESERVATIVE FREE 5 ML: 5 INJECTION INTRAVENOUS at 08:02

## 2024-03-02 RX ADMIN — SODIUM CHLORIDE, PRESERVATIVE FREE 5 ML: 5 INJECTION INTRAVENOUS at 08:05

## 2024-03-02 RX ADMIN — MIDODRINE HYDROCHLORIDE 5 MG: 5 TABLET ORAL at 12:18

## 2024-03-02 RX ADMIN — MIDODRINE HYDROCHLORIDE 5 MG: 5 TABLET ORAL at 20:33

## 2024-03-02 RX ADMIN — SODIUM CHLORIDE, PRESERVATIVE FREE 10 ML: 5 INJECTION INTRAVENOUS at 08:03

## 2024-03-02 RX ADMIN — MIDODRINE HYDROCHLORIDE 5 MG: 5 TABLET ORAL at 16:03

## 2024-03-02 RX ADMIN — Medication 6 MCG/MIN: at 19:06

## 2024-03-02 RX ADMIN — INSULIN LISPRO 4 UNITS: 100 INJECTION, SOLUTION INTRAVENOUS; SUBCUTANEOUS at 12:36

## 2024-03-02 RX ADMIN — SODIUM CHLORIDE, PRESERVATIVE FREE 10 ML: 5 INJECTION INTRAVENOUS at 08:04

## 2024-03-02 RX ADMIN — SODIUM CHLORIDE, PRESERVATIVE FREE 10 ML: 5 INJECTION INTRAVENOUS at 20:36

## 2024-03-02 ASSESSMENT — PAIN SCALES - GENERAL
PAINLEVEL_OUTOF10: 0

## 2024-03-02 NOTE — CARE COORDINATION
Transitional Planning  VMM received from [facility] [address], patient is a current patient and able to accept back.

## 2024-03-02 NOTE — CARE COORDINATION
03/02/24 0838   Readmission Assessment   Number of Days since last admission? 1-7 days   Previous Disposition Home Alone   Who is being Interviewed Patient   What was the patient's/caregiver's perception as to why they think they needed to return back to the hospital? Other (Comment)  (\"legs went out and I went to the floor then called for help\")   Did you visit your Primary Care Physician after you left the hospital, before you returned this time? No   Why weren't you able to visit your PCP? Other (Comment)  (appointment coming up)   Did you see a specialist, such as Cardiac, Pulmonary, Orthopedic Physician, etc. after you left the hospital? Yes  (Podiatrist)   Who advised the patient to return to the hospital? Self-referral   Does the patient report anything that got in the way of taking their medications? No   In our efforts to provide the best possible care to you and others like you, can you think of anything that we could have done to help you after you left the hospital the first time, so that you might not have needed to return so soon? Other (Comment)  (Nothing)

## 2024-03-02 NOTE — PROGRESS NOTES
[facility]   Department of Internal Medicine & Critical Care - Staff Internal Medicine Teaching Service     Critical Care - Daily Progress Note      Date and time: 3/2/2024 7:03 AM  Patient's name:  Kb Wilson  Medical Record Number: [id number]  Patient's account/billing number: [id number]  Patient's YOB: 1945  Age: 78 y.o.  Date of Admission: 2/29/2024  3:40 PM  Length of stay during current admission: 2  Primary Care Physician: Viridiana Taveras  ICU Attending Physician: Judson Cortés MD    Code Status: Full Code  Reason for ICU admission: cardiac arrest   Subjective:     Pt was seen and examined at bedside.  Intubated, sedated and mechanically ventilated.  Vitals stable.  Labs reviewed.    OVERNIGHT EVENTS:           Doing well post extubation  1 L urine output over 24 hours  No signs of active bleeding      CURRENT VENTILATION STATUS:  Mechanical Ventilation     SEDATION AND ANALGESIA:   No Sedation/Analgesia    PARALYZED: No    VASOPRESSORS:   Norepinephrine at 7    URINE OUTPUT:   [x] Good  [] Low  [] Anuric      CONSULT SERVICES: CARDIOLOGY    BRIEF SUMMARY:    Kb Wilson is a 78 y.o. with a history of:  CAD status post CABG on Plavix  CHF with ejection fraction 20% has AICD  Paroxysmal A-fib on Eliquis  Diabetes mellitus  Hypertension  Stage III kidney disease     Patient presented to [place] emergency department on 2/27/2024 for bright red blood when he wiped when going to the bathroom just prior to presenting to the emergency room.  Concern for GI bleed.  CT abdomen pelvis without contrast suggest diverticulosis.  Patient was admitted for GI consult was started on Protonix infusion.  Patient had EGD that was normal.  Patient was prepped for colonoscopy today.  Patient was given propofol and the colonoscopy was about to begin when he cardiac arrested.  Cardiac arrested a total of 3 times in the OR.  He was intubated.  He was then  leucocytosis  Antimicrobials: None  Blood cultures negative so far  Hematology:  Transfuse if Hb drops below 7 or as clinically indicated.   Endocrine:   Type 2 DM  Medium dose sliding scale  Monitor glucose levels  DVT Prophylaxis  EPC Cuffs  On hold due to GI bleed    CONSULT SERVICES: CARDIOLOGY and GASTROENTEROLOGY    DISPOSITION:  [x] To remain ICU  [] OK for out of ICU from Critical Care standpoint    Raleigh Almodovar  PGY-2, Internal Medicine Resident  [facility], [place]         3/2/2024, 7:03 AM   Attending Physician Statement  I have discussed the care of Kb Wilson, including pertinent history and exam findings,  with the resident. I have seen and examined the patient and the key elements of all parts of the encounter have been performed by me.  I agree with the assessment, plan and orders as documented by the resident with additions .    Cardiogenic shock   Post cardiac arrest during colonoscopy   Severe ischemic cardiomyopathy   Plan   Wean levophed keep SBP >90   He has Diana extubation   Increase midodrine   Cardiology note reviewed . Will need to d/w gi regarding plan for clearance to start anti platelets if required .    Total critical care time caring for this patient with life threatening, unstable organ failure, including direct patient contact, management of life support systems, review of data including imaging and labs, discussions with other team members and physicians at least 35 Min so far today, excluding procedures.     Electronically signed by BHASKAR VÁSQUEZ MD on   3/2/24 at 4:31 PM EST    Please note that this chart was generated using voice recognition Dragon dictation software.  Although every effort was made to ensure the accuracy of this automated transcription, some errors in transcription may have occurred.

## 2024-03-02 NOTE — PROGRESS NOTES
[facility] Gastroenterology Progress Note    Kb Wilson is a 78 y.o. male patient.  Hospitalization Day:2      Chief consult reason: Rectal bleed      Subjective: Patient seen and examined.  Staff reports patient had a greenish-brown stool overnight.  No signs of bright red blood.  Patient is reporting chest soreness from CPR yesterday otherwise feels well      Objective:   VITALS:  BP (!) 101/54   Pulse 98   Temp 98.1 °F (36.7 °C)   Resp 25   Wt 125.5 kg (276 lb 11.2 oz)   SpO2 92%   BMI 34.59 kg/m²   TEMPERATURE:  Current - Temp: 98.1 °F (36.7 °C); Max - Temp  Av.5 °F (37.5 °C)  Min: 98.1 °F (36.7 °C)  Max: 100.6 °F (38.1 °C)    Physical Assessment:  General appearance:  alert, cooperative and no distress  Mental Status:  oriented to person, place and time and normal affect  Lungs:  clear to auscultation bilaterally, normal effort, chest soreness  Heart:  regular rate and rhythm, no murmur  Abdomen:  soft, obese, nontender, nondistended, normal bowel sounds, no masses  Extremities:  no edema, no redness, No clubbing  Skin:  warm, dry, no gross lesions or rashes    CURRENT MEDICATIONS:  Scheduled Meds:   sodium chloride flush  5-40 mL IntraVENous 2 times per day    pantoprazole (PROTONIX) 40 mg in sodium chloride (PF) 0.9 % 10 mL injection  40 mg IntraVENous BID    insulin lispro  0-8 Units SubCUTAneous TID WC    insulin lispro  0-4 Units SubCUTAneous Nightly    midazolam  2 mg IntraVENous Once     Continuous Infusions:   EPINEPHrine      sodium chloride 10 mL/hr at 24 1704    norepinephrine 3 mcg/min (24 0640)    vasopressin Stopped (24 2121)    fentaNYL 50 mcg/mL Stopped (24 0746)    midazolam Stopped (24 0745)    dextrose       PRN Meds:sodium chloride flush, sodium chloride, potassium chloride **OR** potassium chloride, magnesium sulfate, ondansetron **OR** ondansetron, polyethylene glycol, acetaminophen **OR** acetaminophen, glucose, dextrose bolus **OR**  dextrose bolus, glucagon (rDNA), dextrose      Data Review:  LABS and IMAGING:     CBC  Recent Labs     02/29/24  0145 02/29/24  0836 02/29/24  1429 02/29/24 2139 03/01/24  0545 03/01/24  0947 03/01/24  1520 03/01/24 2118 03/02/24  0343   WBC 8.7  --  20.8*  --  11.7*  --   --   --  9.5   HGB 8.6*   < > 8.5*   < > 8.7* 8.3* 8.0* 8.0* 7.8*   HCT 26.3*   < > 26.6*   < > 27.0* 25.7* 25.8* 26.0* 25.7*   MCV 87.5  --  88.2  --  89.4  --   --   --  94.1   MCHC 32.7  --  32.1  --  32.2  --   --   --  30.4   RDW 19.3*  --  19.2*  --  18.6*  --   --   --  19.2*     --  202  --  203  --   --   --  154    < > = values in this interval not displayed.       Immature PLTs   No results found for: \"PLTFLUORE\"    PT/INR  Recent Labs     02/29/24 2139   PROTIME 15.2*   INR 1.2       ANEMIA STUDIES  No results for input(s): \"TIBC\", \"IRON\", \"FERRITIN\", \"[id number]\", \"FOLATE\", \"OCCULTBLD\" in the last 72 hours.    Invalid input(s): \"LABIRON\"    BMP  Recent Labs     02/29/24 2139 02/29/24 2221 03/01/24  0545 03/02/24  0343   NA  --  144 144 142   K  --  4.5 4.9 4.7   CL  --  107 109* 109*   CO2  --  20 20 24   BUN  --  39* 36* 31*   CREATININE  --  2.0* 1.8* 1.6*   GLUCOSE  --  330* 290* 190*   CALCIUM  --  7.3* 7.7* 7.9*   MG 2.1  --   --   --        LFTS  Recent Labs     02/29/24  1429   ALKPHOS 77   *   *   BILITOT 0.6   LABALBU 2.5*       AMYLASE/LIPASE/AMMONIA  No results for input(s): \"AMYLASE\", \"LIPASE\", \"AMMONIA\" in the last 72 hours.    Acute Hepatitis Panel   No results found for: \"HEPBSAG\", \"HEPCAB\", \"HEPBIGM\", \"HEPAIGM\"    HCV Genotype   No components found for: \"HEPATITISCGENOTYPE\"    HCV Quantitative   No results found for: \"HCVQNT\"    LIVER WORK UP:    AFP  No results found for: \"AFP\"    Alpha 1 antitrypsin   No results found for: \"A1A\"    Anti - Liver/Kidney Ab  No results found for: \"LIVER-KIDNEYMICROSOMALAB\"    ANCA  No results found for: \"ANCA\"    AMA  No results found for: \"MITOAB\"    ASMA  No

## 2024-03-02 NOTE — PLAN OF CARE
Problem: Chronic Conditions and Co-morbidities  Goal: Patient's chronic conditions and co-morbidity symptoms are monitored and maintained or improved  3/2/2024 1030 by Sabra Martinez RN  Outcome: Progressing     Problem: Safety - Adult  Goal: Free from fall injury  3/2/2024 1030 by Sabra Martinez RN  Outcome: Progressing     Problem: Discharge Planning  Goal: Discharge to home or other facility with appropriate resources  3/2/2024 1030 by Sabra Martinez, RN  Outcome: Progressing     Problem: Respiratory - Adult  Goal: Achieves optimal ventilation and oxygenation  3/2/2024 1030 by Sabra Martinez RN  Outcome: Progressing     Problem: Skin/Tissue Integrity  Goal: Absence of new skin breakdown  Description: 1.  Monitor for areas of redness and/or skin breakdown  2.  Assess vascular access sites hourly  3.  Every 4-6 hours minimum:  Change oxygen saturation probe site  4.  Every 4-6 hours:  If on nasal continuous positive airway pressure, respiratory therapy assess nares and determine need for appliance change or resting period.  3/2/2024 1030 by Sabra Martinez, RN  Outcome: Progressing

## 2024-03-02 NOTE — CARE COORDINATION
Case Management Assessment  Initial Evaluation    Date/Time of Evaluation: 3/2/2024 8:33 AM  Assessment Completed by: Betty Kimball    If patient is discharged prior to next notation, then this note serves as note for discharge by case management.    Patient Name: Kb Wilson                   YOB: 1945  Diagnosis: Cardiac arrest (HCC) [I46.9]                   Date / Time: 2/29/2024  3:40 PM    Patient Admission Status: Inpatient   Readmission Risk (Low < 19, Mod (19-27), High > 27): Readmission Risk Score: 21.9    Current PCP: Viridiana Taveras  PCP verified by CM? (P) Yes    Chart Reviewed: Yes      History Provided by: (P) Patient  Patient Orientation: (P) Alert and Oriented    Patient Cognition: (P) Alert    Hospitalization in the last 30 days (Readmission):  Yes    If yes, Readmission Assessment in CM Navigator will be completed.    Advance Directives:      Code Status: Full Code   Patient's Primary Decision Maker is: (P) Legal Next of Kin      Discharge Planning:    Patient lives with: (P) Alone Type of Home: (P) Apartment  Primary Care Giver: (P) Self  Patient Support Systems include: (P) Children, Family Members   Current Financial resources: (P) Other (Comment) ([employer])  Current community resources: (P) None  Current services prior to admission: (P) Durable Medical Equipment, Oxygen Therapy            Current DME: (P) Walker            Type of Home Care services:       ADLS  Prior functional level: (P) Independent in ADLs/IADLs  Current functional level: (P) Independent in ADLs/IADLs    PT AM-PAC:   /24  OT AM-PAC:   /24    Family can provide assistance at DC: (P) No  Would you like Case Management to discuss the discharge plan with any other family members/significant others, and if so, who? (P) No  Plans to Return to Present Housing: (P) Yes  Other Identified Issues/Barriers to RETURNING to current housing: Medical complications  Potential Assistance needed at discharge: (P) Home

## 2024-03-02 NOTE — PLAN OF CARE
Problem: Chronic Conditions and Co-morbidities  Goal: Patient's chronic conditions and co-morbidity symptoms are monitored and maintained or improved  Outcome: Progressing     Problem: Safety - Adult  Goal: Free from fall injury  Outcome: Progressing     Problem: Discharge Planning  Goal: Discharge to home or other facility with appropriate resources  Outcome: Progressing     Problem: Respiratory - Adult  Goal: Achieves optimal ventilation and oxygenation  Outcome: Progressing     Problem: Pain  Goal: Verbalizes/displays adequate comfort level or baseline comfort level  Outcome: Progressing     Problem: Skin/Tissue Integrity  Goal: Absence of new skin breakdown  Description: 1.  Monitor for areas of redness and/or skin breakdown  2.  Assess vascular access sites hourly  3.  Every 4-6 hours minimum:  Change oxygen saturation probe site  4.  Every 4-6 hours:  If on nasal continuous positive airway pressure, respiratory therapy assess nares and determine need for appliance change or resting period.  Outcome: Progressing     Problem: ABCDS Injury Assessment  Goal: Absence of physical injury  Outcome: Progressing  Flowsheets (Taken 3/1/2024 9195)  Absence of Physical Injury: Implement safety measures based on patient assessment

## 2024-03-02 NOTE — PROGRESS NOTES
[facility]    Progress               Today's Date: 3/2/2024  Patient Name: Kb Wilson  Date of admission: 2/29/2024  3:40 PM  Patient's age: 78 y.o., 1945  Admission Dx: Cardiac arrest (HCC) [I46.9]    Subjective:  Patient seen and examined at bedside.  Currently normal sinus rhythm.  Currently on Levophed for blood pressure support.  Requiring nasal cannula oxygen.  No chest pain    Past Medical History:   has a past medical history of CAD (coronary artery disease), CHF (congestive heart failure) ([facility]), Diabetes mellitus (HCC), and Hypertension.    Past Surgical History:   has a past surgical history that includes Cardiac defibrillator placement; Coronary artery bypass graft; Upper gastrointestinal endoscopy (N/A, 2/28/2024); and Colonoscopy (N/A, 2/29/2024).     Home Medications:    Prior to Admission medications    Medication Sig Start Date End Date Taking? Authorizing Provider   carvedilol (COREG) 3.125 MG tablet Take 1 tablet by mouth 2 times daily (with meals) 2/25/24   Marlena Hedrick MD   aspirin 81 MG chewable tablet Take 1 tablet by mouth daily  Patient not taking: Reported on 2/27/2024 2/26/24   Marlena Hedrick MD   atorvastatin (LIPITOR) 80 MG tablet Take 1 tablet by mouth nightly 2/25/24   Marlena Hedrick MD   calcitRIOL (ROCALTROL) 0.25 MCG capsule Take 1 capsule by mouth as needed Three times weekly 7/8/23   Laura More MD   apixaban (ELIQUIS) 5 MG TABS tablet Take 1 tablet by mouth 2 times daily 7/8/23   Laura More MD   famotidine (PEPCID) 40 MG tablet Take 1 tablet by mouth daily 7/8/23   Laura More MD   OZEMPIC, 0.25 OR 0.5 MG/DOSE, 2 MG/3ML SOPN  1/3/24   Laura More MD   insulin glargine (LANTUS;BASAGLAR) 100 UNIT/ML injection pen Inject 40 Units into the skin daily (with breakfast) 2/8/24   Laura More MD   dapagliflozin (FARXIGA) 10 MG tablet Take 1 tablet by mouth daily 12/7/23   Laura More MD   losartan  (COZAAR) 25 MG tablet Take 1 tablet by mouth daily 4/7/23   Laura More MD   mirtazapine (REMERON) 7.5 MG tablet Take 1 tablet by mouth nightly 2/18/23   Laura More MD   torsemide (DEMADEX) 20 MG tablet Take 1 tablet by mouth daily 1/19/24   Laura More MD      Current Facility-Administered Medications: EPINEPHrine 5 mg in sodium chloride 0.9 % 250 mL infusion, 1-20 mcg/min, IntraVENous, Continuous  sodium chloride flush 0.9 % injection 5-40 mL, 5-40 mL, IntraVENous, 2 times per day  sodium chloride flush 0.9 % injection 5-40 mL, 5-40 mL, IntraVENous, PRN  0.9 % sodium chloride infusion, , IntraVENous, PRN  potassium chloride 20 mEq/50 mL IVPB (Central Line), 20 mEq, IntraVENous, PRN **OR** potassium chloride 10 mEq/100 mL IVPB (Peripheral Line), 10 mEq, IntraVENous, PRN  magnesium sulfate 2000 mg in 50 mL IVPB premix, 2,000 mg, IntraVENous, PRN  ondansetron (ZOFRAN-ODT) disintegrating tablet 4 mg, 4 mg, Oral, Q8H PRN **OR** ondansetron (ZOFRAN) injection 4 mg, 4 mg, IntraVENous, Q6H PRN  polyethylene glycol (GLYCOLAX) packet 17 g, 17 g, Oral, Daily PRN  acetaminophen (TYLENOL) tablet 650 mg, 650 mg, Oral, Q6H PRN **OR** acetaminophen (TYLENOL) suppository 650 mg, 650 mg, Rectal, Q6H PRN  norepinephrine (LEVOPHED) 16 mg in sodium chloride 0.9 % 250 mL infusion, 1-100 mcg/min, IntraVENous, Continuous  vasopressin (VASOSTRICT) 20 units in dextrose 5% 100 mL infusion, 0.03 Units/min, IntraVENous, Continuous  fentaNYL 50 mcg/mL 50 mL infusion,  mcg/hr, IntraVENous, Continuous  midazolam (VERSED) 100mg/100mL in NS infusion, 1-10 mg/hr, IntraVENous, Continuous  pantoprazole (PROTONIX) 40 mg in sodium chloride (PF) 0.9 % 10 mL injection, 40 mg, IntraVENous, BID  insulin lispro (HUMALOG) injection vial 0-8 Units, 0-8 Units, SubCUTAneous, TID WC  insulin lispro (HUMALOG) injection vial 0-4 Units, 0-4 Units, SubCUTAneous, Nightly  glucose chewable tablet 16 g, 4 tablet, Oral, PRN  dextrose

## 2024-03-03 PROBLEM — I24.9 ACS (ACUTE CORONARY SYNDROME) (HCC): Status: ACTIVE | Noted: 2024-02-29

## 2024-03-03 LAB
ANION GAP SERPL CALCULATED.3IONS-SCNC: 7 MMOL/L (ref 9–17)
ANION GAP SERPL CALCULATED.3IONS-SCNC: 8 MMOL/L (ref 9–17)
ANTI-XA UNFRAC HEPARIN: 0.25 IU/L
ANTI-XA UNFRAC HEPARIN: <0.1 IU/L
BASOPHILS # BLD: <0.03 K/UL (ref 0–0.2)
BASOPHILS NFR BLD: 0 % (ref 0–2)
BUN SERPL-MCNC: 33 MG/DL (ref 8–23)
BUN SERPL-MCNC: 33 MG/DL (ref 8–23)
CALCIUM SERPL-MCNC: 7.8 MG/DL (ref 8.6–10.4)
CALCIUM SERPL-MCNC: 7.8 MG/DL (ref 8.6–10.4)
CHLORIDE SERPL-SCNC: 107 MMOL/L (ref 98–107)
CHLORIDE SERPL-SCNC: 108 MMOL/L (ref 98–107)
CO2 SERPL-SCNC: 19 MMOL/L (ref 20–31)
CO2 SERPL-SCNC: 23 MMOL/L (ref 20–31)
CREAT SERPL-MCNC: 1.5 MG/DL (ref 0.7–1.2)
CREAT SERPL-MCNC: 1.5 MG/DL (ref 0.7–1.2)
EOSINOPHIL # BLD: 0.07 K/UL (ref 0–0.44)
EOSINOPHILS RELATIVE PERCENT: 1 % (ref 1–4)
ERYTHROCYTE [DISTWIDTH] IN BLOOD BY AUTOMATED COUNT: 19.2 % (ref 11.8–14.4)
GFR SERPL CREATININE-BSD FRML MDRD: 48 ML/MIN/1.73M2
GFR SERPL CREATININE-BSD FRML MDRD: 48 ML/MIN/1.73M2
GLUCOSE BLD-MCNC: 207 MG/DL (ref 75–110)
GLUCOSE BLD-MCNC: 217 MG/DL (ref 75–110)
GLUCOSE BLD-MCNC: 311 MG/DL (ref 75–110)
GLUCOSE BLD-MCNC: 371 MG/DL (ref 75–110)
GLUCOSE SERPL-MCNC: 223 MG/DL (ref 70–99)
GLUCOSE SERPL-MCNC: 310 MG/DL (ref 70–99)
HCT VFR BLD AUTO: 26.8 % (ref 40.7–50.3)
HCT VFR BLD AUTO: 29.2 % (ref 40.7–50.3)
HCT VFR BLD AUTO: 29.2 % (ref 40.7–50.3)
HGB BLD-MCNC: 8 G/DL (ref 13–17)
HGB BLD-MCNC: 8.2 G/DL (ref 13–17)
HGB BLD-MCNC: 8.8 G/DL (ref 13–17)
IMM GRANULOCYTES # BLD AUTO: 0.07 K/UL (ref 0–0.3)
IMM GRANULOCYTES NFR BLD: 1 %
INR PPP: 1.1
LYMPHOCYTES NFR BLD: 1.12 K/UL (ref 1.1–3.7)
LYMPHOCYTES RELATIVE PERCENT: 14 % (ref 24–43)
MAGNESIUM SERPL-MCNC: 2.4 MG/DL (ref 1.6–2.6)
MCH RBC QN AUTO: 28.7 PG (ref 25.2–33.5)
MCHC RBC AUTO-ENTMCNC: 28.1 G/DL (ref 28.4–34.8)
MCV RBC AUTO: 102.1 FL (ref 82.6–102.9)
MONOCYTES NFR BLD: 0.53 K/UL (ref 0.1–1.2)
MONOCYTES NFR BLD: 7 % (ref 3–12)
NEUTROPHILS NFR BLD: 77 % (ref 36–65)
NEUTS SEG NFR BLD: 6.05 K/UL (ref 1.5–8.1)
NRBC BLD-RTO: 0.4 PER 100 WBC
PARTIAL THROMBOPLASTIN TIME: 28.9 SEC (ref 23–36.5)
PARTIAL THROMBOPLASTIN TIME: 57.1 SEC (ref 23–36.5)
PLATELET # BLD AUTO: 147 K/UL (ref 138–453)
PMV BLD AUTO: 9.8 FL (ref 8.1–13.5)
POTASSIUM SERPL-SCNC: 4.7 MMOL/L (ref 3.7–5.3)
POTASSIUM SERPL-SCNC: 5 MMOL/L (ref 3.7–5.3)
PROTHROMBIN TIME: 13.9 SEC (ref 11.7–14.9)
RBC # BLD AUTO: 2.86 M/UL (ref 4.21–5.77)
RBC # BLD: ABNORMAL 10*6/UL
RBC # BLD: ABNORMAL 10*6/UL
SODIUM SERPL-SCNC: 135 MMOL/L (ref 135–144)
SODIUM SERPL-SCNC: 137 MMOL/L (ref 135–144)
TROPONIN I SERPL HS-MCNC: 469 NG/L (ref 0–22)
TROPONIN I SERPL HS-MCNC: 483 NG/L (ref 0–22)
WBC OTHER # BLD: 8 K/UL (ref 3.5–11.3)

## 2024-03-03 PROCEDURE — 84484 ASSAY OF TROPONIN QUANT: CPT

## 2024-03-03 PROCEDURE — 80048 BASIC METABOLIC PNL TOTAL CA: CPT

## 2024-03-03 PROCEDURE — 6360000002 HC RX W HCPCS

## 2024-03-03 PROCEDURE — 85610 PROTHROMBIN TIME: CPT

## 2024-03-03 PROCEDURE — 2700000000 HC OXYGEN THERAPY PER DAY

## 2024-03-03 PROCEDURE — 99232 SBSQ HOSP IP/OBS MODERATE 35: CPT | Performed by: INTERNAL MEDICINE

## 2024-03-03 PROCEDURE — 99233 SBSQ HOSP IP/OBS HIGH 50: CPT | Performed by: INTERNAL MEDICINE

## 2024-03-03 PROCEDURE — 85025 COMPLETE CBC W/AUTO DIFF WBC: CPT

## 2024-03-03 PROCEDURE — 2000000000 HC ICU R&B

## 2024-03-03 PROCEDURE — A4216 STERILE WATER/SALINE, 10 ML: HCPCS

## 2024-03-03 PROCEDURE — 85520 HEPARIN ASSAY: CPT

## 2024-03-03 PROCEDURE — 6370000000 HC RX 637 (ALT 250 FOR IP): Performed by: INTERNAL MEDICINE

## 2024-03-03 PROCEDURE — 85730 THROMBOPLASTIN TIME PARTIAL: CPT

## 2024-03-03 PROCEDURE — 85014 HEMATOCRIT: CPT

## 2024-03-03 PROCEDURE — 85018 HEMOGLOBIN: CPT

## 2024-03-03 PROCEDURE — APPSS45 APP SPLIT SHARED TIME 31-45 MINUTES: Performed by: INTERNAL MEDICINE

## 2024-03-03 PROCEDURE — 94761 N-INVAS EAR/PLS OXIMETRY MLT: CPT

## 2024-03-03 PROCEDURE — 99291 CRITICAL CARE FIRST HOUR: CPT | Performed by: INTERNAL MEDICINE

## 2024-03-03 PROCEDURE — 83735 ASSAY OF MAGNESIUM: CPT

## 2024-03-03 PROCEDURE — 6370000000 HC RX 637 (ALT 250 FOR IP)

## 2024-03-03 PROCEDURE — C9113 INJ PANTOPRAZOLE SODIUM, VIA: HCPCS

## 2024-03-03 PROCEDURE — 36415 COLL VENOUS BLD VENIPUNCTURE: CPT

## 2024-03-03 PROCEDURE — 82947 ASSAY GLUCOSE BLOOD QUANT: CPT

## 2024-03-03 PROCEDURE — 2580000003 HC RX 258

## 2024-03-03 RX ORDER — ASPIRIN 81 MG/1
81 TABLET, CHEWABLE ORAL DAILY
Status: DISCONTINUED | OUTPATIENT
Start: 2024-03-03 | End: 2024-03-15 | Stop reason: HOSPADM

## 2024-03-03 RX ORDER — HEPARIN SODIUM 1000 [USP'U]/ML
4000 INJECTION, SOLUTION INTRAVENOUS; SUBCUTANEOUS ONCE
Status: COMPLETED | OUTPATIENT
Start: 2024-03-03 | End: 2024-03-03

## 2024-03-03 RX ORDER — BENZONATATE 100 MG/1
100 CAPSULE ORAL 3 TIMES DAILY PRN
Status: DISCONTINUED | OUTPATIENT
Start: 2024-03-03 | End: 2024-03-15 | Stop reason: HOSPADM

## 2024-03-03 RX ORDER — HEPARIN SODIUM 10000 [USP'U]/100ML
5-30 INJECTION, SOLUTION INTRAVENOUS CONTINUOUS
Status: DISCONTINUED | OUTPATIENT
Start: 2024-03-03 | End: 2024-03-03

## 2024-03-03 RX ORDER — INSULIN GLARGINE 100 [IU]/ML
5 INJECTION, SOLUTION SUBCUTANEOUS NIGHTLY
Status: DISCONTINUED | OUTPATIENT
Start: 2024-03-03 | End: 2024-03-04

## 2024-03-03 RX ORDER — INSULIN LISPRO 100 [IU]/ML
0-16 INJECTION, SOLUTION INTRAVENOUS; SUBCUTANEOUS
Status: DISCONTINUED | OUTPATIENT
Start: 2024-03-03 | End: 2024-03-05

## 2024-03-03 RX ORDER — HEPARIN SODIUM 1000 [USP'U]/ML
2000 INJECTION, SOLUTION INTRAVENOUS; SUBCUTANEOUS PRN
Status: DISCONTINUED | OUTPATIENT
Start: 2024-03-03 | End: 2024-03-07

## 2024-03-03 RX ORDER — HEPARIN SODIUM 10000 [USP'U]/100ML
5-30 INJECTION, SOLUTION INTRAVENOUS CONTINUOUS
Status: DISCONTINUED | OUTPATIENT
Start: 2024-03-03 | End: 2024-03-07

## 2024-03-03 RX ORDER — HEPARIN SODIUM 1000 [USP'U]/ML
4000 INJECTION, SOLUTION INTRAVENOUS; SUBCUTANEOUS PRN
Status: DISCONTINUED | OUTPATIENT
Start: 2024-03-03 | End: 2024-03-07

## 2024-03-03 RX ORDER — INSULIN LISPRO 100 [IU]/ML
0-4 INJECTION, SOLUTION INTRAVENOUS; SUBCUTANEOUS NIGHTLY
Status: DISCONTINUED | OUTPATIENT
Start: 2024-03-03 | End: 2024-03-05

## 2024-03-03 RX ADMIN — MIDODRINE HYDROCHLORIDE 10 MG: 5 TABLET ORAL at 12:28

## 2024-03-03 RX ADMIN — SODIUM CHLORIDE, PRESERVATIVE FREE 5 ML: 5 INJECTION INTRAVENOUS at 08:02

## 2024-03-03 RX ADMIN — ACETAMINOPHEN 650 MG: 325 TABLET ORAL at 20:40

## 2024-03-03 RX ADMIN — SODIUM CHLORIDE, PRESERVATIVE FREE 10 ML: 5 INJECTION INTRAVENOUS at 08:01

## 2024-03-03 RX ADMIN — HEPARIN SODIUM 2000 UNITS: 1000 INJECTION INTRAVENOUS; SUBCUTANEOUS at 19:23

## 2024-03-03 RX ADMIN — INSULIN LISPRO 2 UNITS: 100 INJECTION, SOLUTION INTRAVENOUS; SUBCUTANEOUS at 08:03

## 2024-03-03 RX ADMIN — SODIUM CHLORIDE, PRESERVATIVE FREE 10 ML: 5 INJECTION INTRAVENOUS at 20:40

## 2024-03-03 RX ADMIN — MIDODRINE HYDROCHLORIDE 10 MG: 5 TABLET ORAL at 08:03

## 2024-03-03 RX ADMIN — HEPARIN SODIUM 7 UNITS/KG/HR: 10000 INJECTION, SOLUTION INTRAVENOUS at 13:57

## 2024-03-03 RX ADMIN — PANTOPRAZOLE SODIUM 40 MG: 40 INJECTION, POWDER, FOR SOLUTION INTRAVENOUS at 20:40

## 2024-03-03 RX ADMIN — HEPARIN SODIUM 4000 UNITS: 1000 INJECTION INTRAVENOUS; SUBCUTANEOUS at 13:55

## 2024-03-03 RX ADMIN — INSULIN LISPRO 16 UNITS: 100 INJECTION, SOLUTION INTRAVENOUS; SUBCUTANEOUS at 13:52

## 2024-03-03 RX ADMIN — SODIUM CHLORIDE, PRESERVATIVE FREE 10 ML: 5 INJECTION INTRAVENOUS at 08:00

## 2024-03-03 RX ADMIN — ASPIRIN 81 MG 81 MG: 81 TABLET ORAL at 12:37

## 2024-03-03 RX ADMIN — PANTOPRAZOLE SODIUM 40 MG: 40 INJECTION, POWDER, FOR SOLUTION INTRAVENOUS at 08:03

## 2024-03-03 RX ADMIN — BENZONATATE 100 MG: 100 CAPSULE ORAL at 14:57

## 2024-03-03 RX ADMIN — INSULIN LISPRO 12 UNITS: 100 INJECTION, SOLUTION INTRAVENOUS; SUBCUTANEOUS at 17:19

## 2024-03-03 RX ADMIN — MIDODRINE HYDROCHLORIDE 10 MG: 5 TABLET ORAL at 17:19

## 2024-03-03 ASSESSMENT — PAIN DESCRIPTION - PAIN TYPE: TYPE: ACUTE PAIN

## 2024-03-03 ASSESSMENT — PAIN - FUNCTIONAL ASSESSMENT: PAIN_FUNCTIONAL_ASSESSMENT: PREVENTS OR INTERFERES SOME ACTIVE ACTIVITIES AND ADLS

## 2024-03-03 ASSESSMENT — PAIN SCALES - GENERAL
PAINLEVEL_OUTOF10: 0
PAINLEVEL_OUTOF10: 8
PAINLEVEL_OUTOF10: 5
PAINLEVEL_OUTOF10: 0

## 2024-03-03 ASSESSMENT — PAIN DESCRIPTION - FREQUENCY: FREQUENCY: INTERMITTENT

## 2024-03-03 ASSESSMENT — PAIN DESCRIPTION - ONSET: ONSET: GRADUAL

## 2024-03-03 ASSESSMENT — PAIN DESCRIPTION - LOCATION: LOCATION: RIB CAGE

## 2024-03-03 ASSESSMENT — PAIN DESCRIPTION - DESCRIPTORS: DESCRIPTORS: ACHING

## 2024-03-03 NOTE — PROGRESS NOTES
[facility] Gastroenterology Progress Note    Kb Wilson is a 78 y.o. male patient.  Hospitalization Day:3      Chief consult reason: Rectal bleed      Subjective: Patient seen and examined.  Patient denies any further episodes of rectal bleeding.  Continues to report upper abdominal pain from CPR.    Objective:   VITALS:  BP (!) 120/58   Pulse 96   Temp 98.8 °F (37.1 °C) (Bladder)   Resp 23   Wt 129.2 kg (284 lb 13.4 oz)   SpO2 96%   BMI 35.60 kg/m²   TEMPERATURE:  Current - Temp: 98.8 °F (37.1 °C); Max - Temp  Av.5 °F (36.9 °C)  Min: 98.2 °F (36.8 °C)  Max: 99.3 °F (37.4 °C)    Physical Assessment:  General appearance:  alert, cooperative and no distress  Mental Status:  oriented to person, place and time and normal affect  Lungs:  clear to auscultation bilaterally, normal effort, chest soreness  Heart:  regular rate and rhythm, no murmur  Abdomen:  soft, obese, nontender, nondistended, normal bowel sounds, no masses  Extremities:  no edema, no redness, No clubbing  Skin:  warm, dry, no gross lesions or rashes    CURRENT MEDICATIONS:  Scheduled Meds:   midodrine  10 mg Oral TID WC    sodium chloride flush  5-40 mL IntraVENous 2 times per day    pantoprazole (PROTONIX) 40 mg in sodium chloride (PF) 0.9 % 10 mL injection  40 mg IntraVENous BID    insulin lispro  0-8 Units SubCUTAneous TID WC    insulin lispro  0-4 Units SubCUTAneous Nightly    midazolam  2 mg IntraVENous Once     Continuous Infusions:   norepinephrine 6 mcg/min (24 0605)    sodium chloride 10 mL/hr at 24 1704    vasopressin Stopped (24)    dextrose       PRN Meds:sodium chloride flush, sodium chloride, potassium chloride **OR** potassium chloride, magnesium sulfate, ondansetron **OR** ondansetron, polyethylene glycol, acetaminophen **OR** acetaminophen, glucose, dextrose bolus **OR** dextrose bolus, glucagon (rDNA), dextrose      Data Review:  LABS and IMAGING:     CBC  Recent Labs     24  1429  02/29/24 2139 03/01/24  0545 03/01/24  0947 03/01/24 2118 03/02/24  0343 03/02/24  0905 03/02/24  1802 03/03/24  0100   WBC 20.8*  --  11.7*  --   --  9.5  --   --  8.0   HGB 8.5*   < > 8.7*   < > 8.0* 7.8* 7.8* 8.3* 8.2*   HCT 26.6*   < > 27.0*   < > 26.0* 25.7* 26.2* 30.4* 29.2*   MCV 88.2  --  89.4  --   --  94.1  --   --  102.1   MCHC 32.1  --  32.2  --   --  30.4  --   --  28.1*   RDW 19.2*  --  18.6*  --   --  19.2*  --   --  19.2*     --  203  --   --  154  --   --  147    < > = values in this interval not displayed.         Immature PLTs   No results found for: \"PLTFLUORE\"    PT/INR  Recent Labs     02/29/24 2139   PROTIME 15.2*   INR 1.2         ANEMIA STUDIES  No results for input(s): \"TIBC\", \"IRON\", \"FERRITIN\", \"[id number]\", \"FOLATE\", \"OCCULTBLD\" in the last 72 hours.    Invalid input(s): \"LABIRON\"    BMP  Recent Labs     02/29/24 2139 02/29/24 2221 03/01/24  0545 03/02/24  0343 03/03/24  0100   NA  --    < > 144 142 135   K  --    < > 4.9 4.7 5.0   CL  --    < > 109* 109* 108*   CO2  --    < > 20 24 19*   BUN  --    < > 36* 31* 33*   CREATININE  --    < > 1.8* 1.6* 1.5*   GLUCOSE  --    < > 290* 190* 223*   CALCIUM  --    < > 7.7* 7.9* 7.8*   MG 2.1  --   --   --   --     < > = values in this interval not displayed.         LFTS  Recent Labs     02/29/24  1429   ALKPHOS 77   *   *   BILITOT 0.6   LABALBU 2.5*         AMYLASE/LIPASE/AMMONIA  No results for input(s): \"AMYLASE\", \"LIPASE\", \"AMMONIA\" in the last 72 hours.    Acute Hepatitis Panel   No results found for: \"HEPBSAG\", \"HEPCAB\", \"HEPBIGM\", \"HEPAIGM\"    HCV Genotype   No components found for: \"HEPATITISCGENOTYPE\"    HCV Quantitative   No results found for: \"HCVQNT\"    LIVER WORK UP:    AFP  No results found for: \"AFP\"    Alpha 1 antitrypsin   No results found for: \"A1A\"    Anti - Liver/Kidney Ab  No results found for: \"LIVER-KIDNEYMICROSOMALAB\"    ANCA  No results found for: \"ANCA\"    AMA  No results found for:

## 2024-03-03 NOTE — PLAN OF CARE
Problem: Chronic Conditions and Co-morbidities  Goal: Patient's chronic conditions and co-morbidity symptoms are monitored and maintained or improved  Outcome: Progressing     Problem: Safety - Adult  Goal: Free from fall injury  Outcome: Progressing     Problem: Discharge Planning  Goal: Discharge to home or other facility with appropriate resources  Outcome: Progressing     Problem: Respiratory - Adult  Goal: Achieves optimal ventilation and oxygenation  Outcome: Progressing     Problem: Skin/Tissue Integrity  Goal: Absence of new skin breakdown  Description: 1.  Monitor for areas of redness and/or skin breakdown  2.  Assess vascular access sites hourly  3.  Every 4-6 hours minimum:  Change oxygen saturation probe site  4.  Every 4-6 hours:  If on nasal continuous positive airway pressure, respiratory therapy assess nares and determine need for appliance change or resting period.  Outcome: Progressing     Problem: ABCDS Injury Assessment  Goal: Absence of physical injury  Outcome: Progressing

## 2024-03-03 NOTE — PROGRESS NOTES
Code    OVERNIGHT EVENTS:      No acute events overnight    TODAY:     AWAKE & FOLLOWING COMMANDS: []   No  [x]   Yes                           SECRETIONS                 Amount:  []   Small []   Moderate []   Large []   None        Color: []   White []   Colored []   Bloody                         SEDATION:                 RAAS Score: []   +1 to -1 []   -2 []   -3 []   -4        Sedation Agent: []   Propofol gtt []   Versed gtt  []   Ativan gtt  []   No Sedation        Paralytic Agent: []   Precedex []   Norcuron []   Nimbex []                         VASOPRESSORS:  []   No  []   Yes            Vasopressor Agent []   Levophed []   Dopamine []   Vasopressin []   Dobutamine  []   Phenylephrine  []   Epinephrine     OBJECTIVE:     VITAL SIGNS:  Patient Vitals for the past 8 hrs:   BP Temp Temp src Pulse Resp SpO2   03/03/24 1415 102/63 99.5 °F (37.5 °C) -- (!) 101 24 97 %   03/03/24 1400 106/63 99.5 °F (37.5 °C) -- (!) 101 21 96 %   03/03/24 1345 112/68 99.5 °F (37.5 °C) -- (!) 111 26 95 %   03/03/24 1330 113/70 99.3 °F (37.4 °C) -- (!) 104 26 98 %   03/03/24 1315 (!) 113/59 99.3 °F (37.4 °C) -- 99 23 95 %   03/03/24 1300 111/69 99.3 °F (37.4 °C) -- 100 30 (!) 85 %   03/03/24 1245 101/65 99.3 °F (37.4 °C) -- (!) 101 25 99 %   03/03/24 1230 (!) 109/58 99.1 °F (37.3 °C) -- (!) 102 24 95 %   03/03/24 1215 96/79 99.3 °F (37.4 °C) -- (!) 104 23 96 %   03/03/24 1200 (!) 101/53 99.3 °F (37.4 °C) Bladder (!) 104 22 96 %   03/03/24 1145 103/70 99.5 °F (37.5 °C) -- (!) 101 23 94 %   03/03/24 1130 114/62 99.5 °F (37.5 °C) -- (!) 102 23 98 %   03/03/24 1115 102/61 99.5 °F (37.5 °C) -- 100 24 99 %   03/03/24 1100 107/61 99.5 °F (37.5 °C) -- 100 24 96 %   03/03/24 1045 (!) 123/103 99.5 °F (37.5 °C) -- 99 23 94 %   03/03/24 1030 (!) 111/59 99.5 °F (37.5 °C) -- (!) 103 26 97 %   03/03/24 1015 98/60 99.5 °F (37.5 °C) -- (!) 104 28 93 %   03/03/24 1000 99/83 99.5 °F (37.5 °C) -- (!) 102 29 92 %   03/03/24 0945 115/65 99.3 °F (37.4 °C)  \"ALKPHOS\", \"ALT\", \"AST\", \"PROT\", \"BILITOT\", \"BILIDIR\", \"LABALBU\" in the last 72 hours.    Pancreatic functions:No results for input(s): \"LACTA\", \"AMYLASE\" in the last 72 hours.  S. Lactic Acid: No results for input(s): \"LACTA\" in the last 72 hours.  Cardiac enzymes:No results for input(s): \"CKTOTAL\", \"CKMB\", \"CKMBINDEX\", \"TROPONINI\" in the last 72 hours.  BNP:No results for input(s): \"BNP\" in the last 72 hours.  Lipid profile: No results for input(s): \"CHOL\", \"TRIG\", \"HDL\", \"LDL\", \"LDLCALC\" in the last 72 hours.  Blood Gases: No results found for: \"PH\", \"PCO2\", \"PO2\", \"HCO3\", \"O2SAT\"  Thyroid functions:   Lab Results   Component Value Date/Time    TSH 2.96 02/23/2024 05:00 AM        Urinalysis: N/A    Microbiology: Cultures during this admission:     Blood cultures:                 [] None drawn      [x] Negative             []  Positive (Details:  )  Urine Culture:                   [x] None drawn      [] Negative             []  Positive (Details:  )  Sputum Culture:               [x] None drawn       [] Negative             []  Positive (Details:  )   Endotracheal aspirate:     [x] None drawn       [] Negative             []  Positive (Details:  )     Radiology/Imaging:  XR CHEST PORTABLE   Final Result   No acute cardiopulmonary process.      Support tubes as described above.         XR CHEST PORTABLE   Final Result   Endotracheal tube tip is 5.3 cm above the annamaria.  Improved aeration of the   lungs compared with earlier today.         XR ABDOMEN FOR NG/OG/NE TUBE PLACEMENT   Final Result   Enteric tube side hole overlying the stomach.  Distal tip beyond the field of   view.             ASSESSMENT:     Patient Active Problem List    Diagnosis Date Noted    NSTEMI (non-ST elevated myocardial infarction) ([facility]) 03/02/2024    Diverticular hemorrhage 03/02/2024    Acute blood loss anemia 03/02/2024    Cardiac arrest ([facility]) 02/29/2024    ACS (acute coronary syndrome) ([facility]) 02/29/2024    Rectal bleeding 02/28/2024     members and physicians at least 30   Min so far today, excluding procedures.        Electronically signed by BHASKAR VÁSQUEZ MD on   3/3/24 at 8:45 PM EST    Please note that this chart was generated using voice recognition Dragon dictation software.  Although every effort was made to ensure the accuracy of this automated transcription, some errors in transcription may have occurred.

## 2024-03-03 NOTE — PROGRESS NOTES
[facility]    Progress               Today's Date: 3/3/2024  Patient Name: Kb Wilson  Date of admission: 2/29/2024  3:40 PM  Patient's age: 78 y.o., 1945  Admission Dx: Cardiac arrest (HCC) [I46.9]    Subjective:  Patient seen and examined at bedside.  Currently normal sinus rhythm.  Currently on Levophed at 6 for blood pressure support.  No chest pain currently     Past Medical History:   has a past medical history of CAD (coronary artery disease), CHF (congestive heart failure) ([facility]), Diabetes mellitus (HCC), and Hypertension.    Past Surgical History:   has a past surgical history that includes Cardiac defibrillator placement; Coronary artery bypass graft; Upper gastrointestinal endoscopy (N/A, 2/28/2024); and Colonoscopy (N/A, 2/29/2024).     Home Medications:    Prior to Admission medications    Medication Sig Start Date End Date Taking? Authorizing Provider   carvedilol (COREG) 3.125 MG tablet Take 1 tablet by mouth 2 times daily (with meals) 2/25/24   Marlena Hedrick MD   aspirin 81 MG chewable tablet Take 1 tablet by mouth daily  Patient not taking: Reported on 2/27/2024 2/26/24   Marlena Hedrick MD   atorvastatin (LIPITOR) 80 MG tablet Take 1 tablet by mouth nightly 2/25/24   Marlena Hedrick MD   calcitRIOL (ROCALTROL) 0.25 MCG capsule Take 1 capsule by mouth as needed Three times weekly 7/8/23   Laura More MD   apixaban (ELIQUIS) 5 MG TABS tablet Take 1 tablet by mouth 2 times daily 7/8/23   Laura More MD   famotidine (PEPCID) 40 MG tablet Take 1 tablet by mouth daily 7/8/23   Laura More MD   OZEMPIC, 0.25 OR 0.5 MG/DOSE, 2 MG/3ML SOPN  1/3/24   Laura More MD   insulin glargine (LANTUS;BASAGLAR) 100 UNIT/ML injection pen Inject 40 Units into the skin daily (with breakfast) 2/8/24   Laura More MD   dapagliflozin (FARXIGA) 10 MG tablet Take 1 tablet by mouth daily 12/7/23   Laura More MD   losartan (COZAAR) 25 MG  PRN  midazolam PF (VERSED) injection 2 mg, 2 mg, IntraVENous, Once    Allergies:  Barium-containing compounds, Iodinated contrast media, and Hydromorphone    Social History:   reports that he has never smoked. He has never used smokeless tobacco. He reports current alcohol use. He reports that he does not use drugs.     Family History: family history is not on file. No h/o sudden cardiac death.No for premature CAD    REVIEW OF SYSTEMS:    Constitutional: there has been no unanticipated weight loss. There's been No change in energy level, No change in activity level.     Eyes: No visual changes or diplopia. No scleral icterus.  ENT: No Headaches  Cardiovascular: No cardiac history  Respiratory: No previous pulmonary problems, No cough  Gastrointestinal: No abdominal pain.  No change in bowel or bladder habits.  Genitourinary: No dysuria, trouble voiding, or hematuria.  Musculoskeletal:  No gait disturbance, No weakness or joint complaints.  Integumentary: No rash or pruritis.  Neurological: No headache, diplopia, change in muscle strength, numbness or tingling. No change in gait, balance, coordination, mood, affect, memory, mentation, behavior.  Psychiatric: No anxiety, or depression.  Endocrine: No temperature intolerance. No excessive thirst, fluid intake, or urination. No tremor.  Hematologic/Lymphatic: No abnormal bruising or bleeding, blood clots or swollen lymph nodes.  Allergic/Immunologic: No nasal congestion or hives.      PHYSICAL EXAM:      BP (!) 120/58   Pulse 96   Temp 98.8 °F (37.1 °C) (Bladder)   Resp 23   Wt 129.2 kg (284 lb 13.4 oz)   SpO2 96%   BMI 35.60 kg/m²    Constitutional and General Appearance: alert, cooperative, no distress and appears stated age  HEENT: PERRL, no cervical lymphadenopathy. No masses palpable. Normal oral mucosa  Respiratory:  Normal excursion and expansion without use of accessory muscles  Resp Auscultation: Good respiratory effort. No for increased work of

## 2024-03-04 ENCOUNTER — APPOINTMENT (OUTPATIENT)
Age: 79
DRG: 981 | End: 2024-03-04
Attending: INTERNAL MEDICINE
Payer: COMMERCIAL

## 2024-03-04 LAB
ANION GAP SERPL CALCULATED.3IONS-SCNC: 9 MMOL/L (ref 9–17)
ANTI-XA UNFRAC HEPARIN: 0.3 IU/L
ANTI-XA UNFRAC HEPARIN: 0.36 IU/L
BACTERIA URNS QL MICRO: NORMAL
BASOPHILS # BLD: 0.04 K/UL (ref 0–0.2)
BASOPHILS NFR BLD: 0 % (ref 0–2)
BILIRUB UR QL STRIP: NEGATIVE
BUN SERPL-MCNC: 38 MG/DL (ref 8–23)
CALCIUM SERPL-MCNC: 8.3 MG/DL (ref 8.6–10.4)
CASTS #/AREA URNS LPF: NORMAL /LPF (ref 0–8)
CHLORIDE SERPL-SCNC: 104 MMOL/L (ref 98–107)
CLARITY UR: CLEAR
CO2 SERPL-SCNC: 23 MMOL/L (ref 20–31)
COLOR UR: YELLOW
CREAT SERPL-MCNC: 1.6 MG/DL (ref 0.7–1.2)
CREAT UR-MCNC: 109.7 MG/DL (ref 39–259)
ECHO AO ASC DIAM: 4.6 CM
ECHO AO ASCENDING AORTA INDEX: 1.81 CM/M2
ECHO AO ROOT DIAM: 4 CM
ECHO AO ROOT INDEX: 1.57 CM/M2
ECHO AV AREA PEAK VELOCITY: 3.3 CM2
ECHO AV AREA VTI: 3.4 CM2
ECHO AV AREA/BSA PEAK VELOCITY: 1.3 CM2/M2
ECHO AV AREA/BSA VTI: 1.3 CM2/M2
ECHO AV MEAN GRADIENT: 3 MMHG
ECHO AV MEAN VELOCITY: 0.8 M/S
ECHO AV PEAK GRADIENT: 5 MMHG
ECHO AV PEAK VELOCITY: 1.1 M/S
ECHO AV VELOCITY RATIO: 0.82
ECHO AV VTI: 19.3 CM
ECHO BSA: 2.6 M2
ECHO EST RA PRESSURE: 15 MMHG
ECHO LA AREA 2C: 33 CM2
ECHO LA AREA 4C: 29.3 CM2
ECHO LA DIAMETER INDEX: 1.93 CM/M2
ECHO LA DIAMETER: 4.9 CM
ECHO LA MAJOR AXIS: 6.9 CM
ECHO LA MINOR AXIS: 6.2 CM
ECHO LA TO AORTIC ROOT RATIO: 1.23
ECHO LA VOL BP: 130 ML (ref 18–58)
ECHO LA VOL MOD A2C: 146 ML (ref 18–58)
ECHO LA VOL MOD A4C: 108 ML (ref 18–58)
ECHO LA VOL/BSA BIPLANE: 51 ML/M2 (ref 16–34)
ECHO LA VOLUME INDEX MOD A2C: 57 ML/M2 (ref 16–34)
ECHO LA VOLUME INDEX MOD A4C: 43 ML/M2 (ref 16–34)
ECHO LV E' SEPTAL VELOCITY: 5 CM/S
ECHO LV EDV A2C: 201 ML
ECHO LV EDV A4C: 206 ML
ECHO LV EDV INDEX A4C: 81 ML/M2
ECHO LV EDV NDEX A2C: 79 ML/M2
ECHO LV EJECTION FRACTION A2C: 23 %
ECHO LV EJECTION FRACTION A4C: 31 %
ECHO LV EJECTION FRACTION BIPLANE: 25 % (ref 55–100)
ECHO LV ESV A2C: 155 ML
ECHO LV ESV A4C: 142 ML
ECHO LV ESV INDEX A2C: 61 ML/M2
ECHO LV ESV INDEX A4C: 56 ML/M2
ECHO LV FRACTIONAL SHORTENING: 13 % (ref 28–44)
ECHO LV INTERNAL DIMENSION DIASTOLE INDEX: 2.48 CM/M2
ECHO LV INTERNAL DIMENSION DIASTOLIC: 6.3 CM (ref 4.2–5.9)
ECHO LV INTERNAL DIMENSION SYSTOLIC INDEX: 2.17 CM/M2
ECHO LV INTERNAL DIMENSION SYSTOLIC: 5.5 CM
ECHO LV IVSD: 1.4 CM (ref 0.6–1)
ECHO LV MASS 2D: 340.4 G (ref 88–224)
ECHO LV MASS INDEX 2D: 134 G/M2 (ref 49–115)
ECHO LV POSTERIOR WALL DIASTOLIC: 1 CM (ref 0.6–1)
ECHO LV RELATIVE WALL THICKNESS RATIO: 0.32
ECHO LVOT AREA: 4.2 CM2
ECHO LVOT AV VTI INDEX: 0.82
ECHO LVOT DIAM: 2.3 CM
ECHO LVOT MEAN GRADIENT: 1 MMHG
ECHO LVOT PEAK GRADIENT: 3 MMHG
ECHO LVOT PEAK VELOCITY: 0.9 M/S
ECHO LVOT STROKE VOLUME INDEX: 26 ML/M2
ECHO LVOT SV: 66 ML
ECHO LVOT VTI: 15.9 CM
ECHO MV AREA VTI: 2.3 CM2
ECHO MV LVOT VTI INDEX: 1.79
ECHO MV MAX VELOCITY: 1.6 M/S
ECHO MV MEAN GRADIENT: 4 MMHG
ECHO MV MEAN VELOCITY: 0.9 M/S
ECHO MV PEAK GRADIENT: 10 MMHG
ECHO MV VTI: 28.5 CM
ECHO PV MAX VELOCITY: 0.7 M/S
ECHO PV PEAK GRADIENT: 2 MMHG
ECHO RIGHT VENTRICULAR SYSTOLIC PRESSURE (RVSP): 58 MMHG
ECHO RV BASAL DIMENSION: 5 CM
ECHO RV FREE WALL PEAK S': 9 CM/S
ECHO RV TAPSE: 1.4 CM (ref 1.7–?)
ECHO TV REGURGITANT MAX VELOCITY: 3.26 M/S
ECHO TV REGURGITANT PEAK GRADIENT: 43 MMHG
EOSINOPHIL # BLD: 0.09 K/UL (ref 0–0.44)
EOSINOPHILS RELATIVE PERCENT: 1 % (ref 1–4)
EPI CELLS #/AREA URNS HPF: NORMAL /HPF (ref 0–5)
ERYTHROCYTE [DISTWIDTH] IN BLOOD BY AUTOMATED COUNT: 19.6 % (ref 11.8–14.4)
GFR SERPL CREATININE-BSD FRML MDRD: 44 ML/MIN/1.73M2
GLUCOSE BLD-MCNC: 196 MG/DL (ref 75–110)
GLUCOSE BLD-MCNC: 255 MG/DL (ref 75–110)
GLUCOSE BLD-MCNC: 278 MG/DL (ref 75–110)
GLUCOSE BLD-MCNC: 361 MG/DL (ref 75–110)
GLUCOSE BLD-MCNC: 367 MG/DL (ref 75–110)
GLUCOSE BLD-MCNC: 387 MG/DL (ref 75–110)
GLUCOSE BLD-MCNC: 394 MG/DL (ref 75–110)
GLUCOSE BLD-MCNC: 405 MG/DL (ref 75–110)
GLUCOSE SERPL-MCNC: 248 MG/DL (ref 70–99)
GLUCOSE UR STRIP-MCNC: ABNORMAL MG/DL
HCT VFR BLD AUTO: 27.6 % (ref 40.7–50.3)
HCT VFR BLD AUTO: 29 % (ref 40.7–50.3)
HCT VFR BLD AUTO: 30.9 % (ref 40.7–50.3)
HCT VFR BLD AUTO: 32.1 % (ref 40.7–50.3)
HGB BLD-MCNC: 8.4 G/DL (ref 13–17)
HGB BLD-MCNC: 8.5 G/DL (ref 13–17)
HGB BLD-MCNC: 8.9 G/DL (ref 13–17)
HGB BLD-MCNC: 9.3 G/DL (ref 13–17)
HGB UR QL STRIP.AUTO: ABNORMAL
IMM GRANULOCYTES # BLD AUTO: 0.16 K/UL (ref 0–0.3)
IMM GRANULOCYTES NFR BLD: 1 %
KETONES UR STRIP-MCNC: NEGATIVE MG/DL
LEUKOCYTE ESTERASE UR QL STRIP: NEGATIVE
LYMPHOCYTES NFR BLD: 1.99 K/UL (ref 1.1–3.7)
LYMPHOCYTES RELATIVE PERCENT: 16 % (ref 24–43)
MCH RBC QN AUTO: 28.8 PG (ref 25.2–33.5)
MCHC RBC AUTO-ENTMCNC: 28.8 G/DL (ref 28.4–34.8)
MCV RBC AUTO: 100 FL (ref 82.6–102.9)
MONOCYTES NFR BLD: 0.75 K/UL (ref 0.1–1.2)
MONOCYTES NFR BLD: 6 % (ref 3–12)
NEUTROPHILS NFR BLD: 75 % (ref 36–65)
NEUTS SEG NFR BLD: 9.13 K/UL (ref 1.5–8.1)
NITRITE UR QL STRIP: NEGATIVE
NRBC BLD-RTO: 0.6 PER 100 WBC
PARTIAL THROMBOPLASTIN TIME: 51.6 SEC (ref 23–36.5)
PARTIAL THROMBOPLASTIN TIME: 76.7 SEC (ref 23–36.5)
PARTIAL THROMBOPLASTIN TIME: 79.5 SEC (ref 23–36.5)
PH UR STRIP: 5.5 [PH] (ref 5–8)
PLATELET # BLD AUTO: 230 K/UL (ref 138–453)
PMV BLD AUTO: 10 FL (ref 8.1–13.5)
POTASSIUM SERPL-SCNC: 5 MMOL/L (ref 3.7–5.3)
PROT UR STRIP-MCNC: ABNORMAL MG/DL
RBC # BLD AUTO: 3.09 M/UL (ref 4.21–5.77)
RBC # BLD: ABNORMAL 10*6/UL
RBC #/AREA URNS HPF: NORMAL /HPF (ref 0–4)
SODIUM SERPL-SCNC: 136 MMOL/L (ref 135–144)
SP GR UR STRIP: 1.03 (ref 1–1.03)
TOTAL PROTEIN, URINE: 26 MG/DL
TROPONIN I SERPL HS-MCNC: 444 NG/L (ref 0–22)
TROPONIN I SERPL HS-MCNC: 624 NG/L (ref 0–22)
URINE TOTAL PROTEIN CREATININE RATIO: 0.24 (ref 0–0.2)
UROBILINOGEN UR STRIP-ACNC: NORMAL EU/DL (ref 0–1)
WBC #/AREA URNS HPF: NORMAL /HPF (ref 0–5)
WBC OTHER # BLD: 12.2 K/UL (ref 3.5–11.3)

## 2024-03-04 PROCEDURE — 6360000002 HC RX W HCPCS: Performed by: INTERNAL MEDICINE

## 2024-03-04 PROCEDURE — 99152 MOD SED SAME PHYS/QHP 5/>YRS: CPT | Performed by: INTERNAL MEDICINE

## 2024-03-04 PROCEDURE — C1769 GUIDE WIRE: HCPCS | Performed by: INTERNAL MEDICINE

## 2024-03-04 PROCEDURE — 82947 ASSAY GLUCOSE BLOOD QUANT: CPT

## 2024-03-04 PROCEDURE — 6360000002 HC RX W HCPCS

## 2024-03-04 PROCEDURE — 81001 URINALYSIS AUTO W/SCOPE: CPT

## 2024-03-04 PROCEDURE — B2131ZZ FLUOROSCOPY OF MULTIPLE CORONARY ARTERY BYPASS GRAFTS USING LOW OSMOLAR CONTRAST: ICD-10-PCS | Performed by: INTERNAL MEDICINE

## 2024-03-04 PROCEDURE — B2111ZZ FLUOROSCOPY OF MULTIPLE CORONARY ARTERIES USING LOW OSMOLAR CONTRAST: ICD-10-PCS | Performed by: INTERNAL MEDICINE

## 2024-03-04 PROCEDURE — 6360000004 HC RX CONTRAST MEDICATION: Performed by: INTERNAL MEDICINE

## 2024-03-04 PROCEDURE — 93306 TTE W/DOPPLER COMPLETE: CPT | Performed by: INTERNAL MEDICINE

## 2024-03-04 PROCEDURE — 2700000000 HC OXYGEN THERAPY PER DAY

## 2024-03-04 PROCEDURE — 027135Z DILATION OF CORONARY ARTERY, TWO ARTERIES WITH TWO DRUG-ELUTING INTRALUMINAL DEVICES, PERCUTANEOUS APPROACH: ICD-10-PCS | Performed by: INTERNAL MEDICINE

## 2024-03-04 PROCEDURE — 99291 CRITICAL CARE FIRST HOUR: CPT | Performed by: INTERNAL MEDICINE

## 2024-03-04 PROCEDURE — 80048 BASIC METABOLIC PNL TOTAL CA: CPT

## 2024-03-04 PROCEDURE — 82570 ASSAY OF URINE CREATININE: CPT

## 2024-03-04 PROCEDURE — 2500000003 HC RX 250 WO HCPCS: Performed by: INTERNAL MEDICINE

## 2024-03-04 PROCEDURE — 2580000003 HC RX 258

## 2024-03-04 PROCEDURE — 4A023N7 MEASUREMENT OF CARDIAC SAMPLING AND PRESSURE, LEFT HEART, PERCUTANEOUS APPROACH: ICD-10-PCS | Performed by: INTERNAL MEDICINE

## 2024-03-04 PROCEDURE — 85520 HEPARIN ASSAY: CPT

## 2024-03-04 PROCEDURE — 36415 COLL VENOUS BLD VENIPUNCTURE: CPT

## 2024-03-04 PROCEDURE — 93455 CORONARY ART/GRFT ANGIO S&I: CPT | Performed by: INTERNAL MEDICINE

## 2024-03-04 PROCEDURE — 99153 MOD SED SAME PHYS/QHP EA: CPT | Performed by: INTERNAL MEDICINE

## 2024-03-04 PROCEDURE — 6370000000 HC RX 637 (ALT 250 FOR IP): Performed by: INTERNAL MEDICINE

## 2024-03-04 PROCEDURE — 85730 THROMBOPLASTIN TIME PARTIAL: CPT

## 2024-03-04 PROCEDURE — 85025 COMPLETE CBC W/AUTO DIFF WBC: CPT

## 2024-03-04 PROCEDURE — 93306 TTE W/DOPPLER COMPLETE: CPT

## 2024-03-04 PROCEDURE — C9113 INJ PANTOPRAZOLE SODIUM, VIA: HCPCS

## 2024-03-04 PROCEDURE — 2500000003 HC RX 250 WO HCPCS

## 2024-03-04 PROCEDURE — 94660 CPAP INITIATION&MGMT: CPT

## 2024-03-04 PROCEDURE — 6370000000 HC RX 637 (ALT 250 FOR IP)

## 2024-03-04 PROCEDURE — C1760 CLOSURE DEV, VASC: HCPCS | Performed by: INTERNAL MEDICINE

## 2024-03-04 PROCEDURE — C1892 INTRO/SHEATH,FIXED,PEEL-AWAY: HCPCS | Performed by: INTERNAL MEDICINE

## 2024-03-04 PROCEDURE — 2580000003 HC RX 258: Performed by: STUDENT IN AN ORGANIZED HEALTH CARE EDUCATION/TRAINING PROGRAM

## 2024-03-04 PROCEDURE — 6370000000 HC RX 637 (ALT 250 FOR IP): Performed by: STUDENT IN AN ORGANIZED HEALTH CARE EDUCATION/TRAINING PROGRAM

## 2024-03-04 PROCEDURE — 5A09357 ASSISTANCE WITH RESPIRATORY VENTILATION, LESS THAN 24 CONSECUTIVE HOURS, CONTINUOUS POSITIVE AIRWAY PRESSURE: ICD-10-PCS | Performed by: INTERNAL MEDICINE

## 2024-03-04 PROCEDURE — 84484 ASSAY OF TROPONIN QUANT: CPT

## 2024-03-04 PROCEDURE — 2709999900 HC NON-CHARGEABLE SUPPLY: Performed by: INTERNAL MEDICINE

## 2024-03-04 PROCEDURE — A4216 STERILE WATER/SALINE, 10 ML: HCPCS

## 2024-03-04 PROCEDURE — 2000000000 HC ICU R&B

## 2024-03-04 PROCEDURE — 93452 LEFT HRT CATH W/VENTRCLGRPHY: CPT | Performed by: INTERNAL MEDICINE

## 2024-03-04 PROCEDURE — 99233 SBSQ HOSP IP/OBS HIGH 50: CPT | Performed by: INTERNAL MEDICINE

## 2024-03-04 PROCEDURE — 99222 1ST HOSP IP/OBS MODERATE 55: CPT | Performed by: INTERNAL MEDICINE

## 2024-03-04 PROCEDURE — C1894 INTRO/SHEATH, NON-LASER: HCPCS | Performed by: INTERNAL MEDICINE

## 2024-03-04 PROCEDURE — 85014 HEMATOCRIT: CPT

## 2024-03-04 PROCEDURE — 94761 N-INVAS EAR/PLS OXIMETRY MLT: CPT

## 2024-03-04 PROCEDURE — B2181ZZ FLUOROSCOPY OF LEFT INTERNAL MAMMARY BYPASS GRAFT USING LOW OSMOLAR CONTRAST: ICD-10-PCS | Performed by: INTERNAL MEDICINE

## 2024-03-04 PROCEDURE — 85018 HEMOGLOBIN: CPT

## 2024-03-04 PROCEDURE — 84156 ASSAY OF PROTEIN URINE: CPT

## 2024-03-04 RX ORDER — ACETAMINOPHEN 325 MG/1
650 TABLET ORAL EVERY 4 HOURS PRN
Status: CANCELLED | OUTPATIENT
Start: 2024-03-04

## 2024-03-04 RX ORDER — ATORVASTATIN CALCIUM 80 MG/1
80 TABLET, FILM COATED ORAL NIGHTLY
Status: DISCONTINUED | OUTPATIENT
Start: 2024-03-04 | End: 2024-03-15 | Stop reason: HOSPADM

## 2024-03-04 RX ORDER — SODIUM CHLORIDE 9 MG/ML
INJECTION, SOLUTION INTRAVENOUS CONTINUOUS
Status: DISCONTINUED | OUTPATIENT
Start: 2024-03-04 | End: 2024-03-04

## 2024-03-04 RX ORDER — TORSEMIDE 20 MG/1
20 TABLET ORAL DAILY
Status: DISCONTINUED | OUTPATIENT
Start: 2024-03-05 | End: 2024-03-08

## 2024-03-04 RX ORDER — FENTANYL CITRATE 50 UG/ML
INJECTION, SOLUTION INTRAMUSCULAR; INTRAVENOUS PRN
Status: DISCONTINUED | OUTPATIENT
Start: 2024-03-04 | End: 2024-03-04 | Stop reason: HOSPADM

## 2024-03-04 RX ORDER — MIDAZOLAM HYDROCHLORIDE 1 MG/ML
INJECTION INTRAMUSCULAR; INTRAVENOUS PRN
Status: DISCONTINUED | OUTPATIENT
Start: 2024-03-04 | End: 2024-03-04 | Stop reason: HOSPADM

## 2024-03-04 RX ORDER — SODIUM CHLORIDE 9 MG/ML
INJECTION, SOLUTION INTRAVENOUS PRN
Status: CANCELLED | OUTPATIENT
Start: 2024-03-04

## 2024-03-04 RX ORDER — DEXTROSE MONOHYDRATE 100 MG/ML
INJECTION, SOLUTION INTRAVENOUS CONTINUOUS PRN
Status: DISCONTINUED | OUTPATIENT
Start: 2024-03-04 | End: 2024-03-15 | Stop reason: HOSPADM

## 2024-03-04 RX ORDER — INSULIN GLARGINE 100 [IU]/ML
5 INJECTION, SOLUTION SUBCUTANEOUS ONCE
Status: COMPLETED | OUTPATIENT
Start: 2024-03-04 | End: 2024-03-04

## 2024-03-04 RX ORDER — INSULIN GLARGINE 100 [IU]/ML
10 INJECTION, SOLUTION SUBCUTANEOUS NIGHTLY
Status: DISCONTINUED | OUTPATIENT
Start: 2024-03-04 | End: 2024-03-05

## 2024-03-04 RX ORDER — DIPHENHYDRAMINE HYDROCHLORIDE 50 MG/ML
INJECTION INTRAMUSCULAR; INTRAVENOUS PRN
Status: DISCONTINUED | OUTPATIENT
Start: 2024-03-04 | End: 2024-03-04 | Stop reason: HOSPADM

## 2024-03-04 RX ORDER — GLUCAGON 1 MG/ML
1 KIT INJECTION PRN
Status: DISCONTINUED | OUTPATIENT
Start: 2024-03-04 | End: 2024-03-15 | Stop reason: HOSPADM

## 2024-03-04 RX ORDER — SODIUM CHLORIDE 0.9 % (FLUSH) 0.9 %
5-40 SYRINGE (ML) INJECTION PRN
Status: CANCELLED | OUTPATIENT
Start: 2024-03-04

## 2024-03-04 RX ORDER — SODIUM CHLORIDE 0.9 % (FLUSH) 0.9 %
5-40 SYRINGE (ML) INJECTION EVERY 12 HOURS SCHEDULED
Status: CANCELLED | OUTPATIENT
Start: 2024-03-04

## 2024-03-04 RX ORDER — FENTANYL CITRATE 50 UG/ML
25 INJECTION, SOLUTION INTRAMUSCULAR; INTRAVENOUS ONCE
Status: COMPLETED | OUTPATIENT
Start: 2024-03-04 | End: 2024-03-04

## 2024-03-04 RX ORDER — FLUMAZENIL 0.1 MG/ML
INJECTION INTRAVENOUS PRN
Status: DISCONTINUED | OUTPATIENT
Start: 2024-03-04 | End: 2024-03-04 | Stop reason: HOSPADM

## 2024-03-04 RX ADMIN — SODIUM CHLORIDE, PRESERVATIVE FREE 10 ML: 5 INJECTION INTRAVENOUS at 08:44

## 2024-03-04 RX ADMIN — SODIUM CHLORIDE, PRESERVATIVE FREE 10 ML: 5 INJECTION INTRAVENOUS at 20:00

## 2024-03-04 RX ADMIN — PANTOPRAZOLE SODIUM 40 MG: 40 INJECTION, POWDER, FOR SOLUTION INTRAVENOUS at 08:45

## 2024-03-04 RX ADMIN — SODIUM CHLORIDE: 9 INJECTION, SOLUTION INTRAVENOUS at 11:00

## 2024-03-04 RX ADMIN — FENTANYL CITRATE 25 MCG: 50 INJECTION INTRAMUSCULAR; INTRAVENOUS at 00:26

## 2024-03-04 RX ADMIN — INSULIN GLARGINE 5 UNITS: 100 INJECTION, SOLUTION SUBCUTANEOUS at 21:32

## 2024-03-04 RX ADMIN — SODIUM CHLORIDE 6.14 UNITS/HR: 9 INJECTION, SOLUTION INTRAVENOUS at 23:50

## 2024-03-04 RX ADMIN — INSULIN GLARGINE 10 UNITS: 100 INJECTION, SOLUTION SUBCUTANEOUS at 20:13

## 2024-03-04 RX ADMIN — SODIUM CHLORIDE, PRESERVATIVE FREE 10 ML: 5 INJECTION INTRAVENOUS at 19:50

## 2024-03-04 RX ADMIN — Medication 4 MCG/MIN: at 00:10

## 2024-03-04 RX ADMIN — HEPARIN SODIUM 9 UNITS/KG/HR: 10000 INJECTION, SOLUTION INTRAVENOUS at 00:10

## 2024-03-04 RX ADMIN — SODIUM CHLORIDE: 9 INJECTION, SOLUTION INTRAVENOUS at 00:10

## 2024-03-04 RX ADMIN — MIDODRINE HYDROCHLORIDE 10 MG: 5 TABLET ORAL at 16:35

## 2024-03-04 RX ADMIN — MIDODRINE HYDROCHLORIDE 10 MG: 5 TABLET ORAL at 08:45

## 2024-03-04 RX ADMIN — ASPIRIN 81 MG 81 MG: 81 TABLET ORAL at 08:45

## 2024-03-04 RX ADMIN — INSULIN LISPRO 8 UNITS: 100 INJECTION, SOLUTION INTRAVENOUS; SUBCUTANEOUS at 16:35

## 2024-03-04 RX ADMIN — INSULIN LISPRO 8 UNITS: 100 INJECTION, SOLUTION INTRAVENOUS; SUBCUTANEOUS at 08:44

## 2024-03-04 RX ADMIN — ATORVASTATIN CALCIUM 80 MG: 80 TABLET, FILM COATED ORAL at 19:58

## 2024-03-04 RX ADMIN — PANTOPRAZOLE SODIUM 40 MG: 40 INJECTION, POWDER, FOR SOLUTION INTRAVENOUS at 19:58

## 2024-03-04 RX ADMIN — INSULIN LISPRO 4 UNITS: 100 INJECTION, SOLUTION INTRAVENOUS; SUBCUTANEOUS at 20:13

## 2024-03-04 ASSESSMENT — PAIN DESCRIPTION - DESCRIPTORS: DESCRIPTORS: SHARP

## 2024-03-04 ASSESSMENT — PAIN DESCRIPTION - LOCATION
LOCATION: RIB CAGE
LOCATION: RIB CAGE

## 2024-03-04 ASSESSMENT — PAIN DESCRIPTION - PAIN TYPE: TYPE: ACUTE PAIN

## 2024-03-04 ASSESSMENT — PAIN SCALES - GENERAL
PAINLEVEL_OUTOF10: 7
PAINLEVEL_OUTOF10: 0
PAINLEVEL_OUTOF10: 0
PAINLEVEL_OUTOF10: 10

## 2024-03-04 ASSESSMENT — PAIN - FUNCTIONAL ASSESSMENT: PAIN_FUNCTIONAL_ASSESSMENT: PREVENTS OR INTERFERES SOME ACTIVE ACTIVITIES AND ADLS

## 2024-03-04 ASSESSMENT — PAIN DESCRIPTION - FREQUENCY: FREQUENCY: INTERMITTENT

## 2024-03-04 ASSESSMENT — PAIN DESCRIPTION - ONSET: ONSET: ON-GOING

## 2024-03-04 NOTE — CONSULTS
Nephrology Consult Note    Reason for Consult:  Chronic kidney disease stage IIIB, status post cardiac catheterization and awaiting high-risk PCI  Requesting Physician:  Milana    Chief Complaint:  post cardiac catheterization  History Obtained From:  patient, electronic medical record    History of Present Illness:              This is a 78 y.o. male who presents with chronic kidney disease and had cardiac arrest and socially went for cardiac catheterization.  The patient has not been seeing a nephrologist.  He was referred to our office, but when we called them to set up an appointment, he did not answer.  The patient recently went to that.  Her emergency room on 2/27/2024 for bright red blood in the stool.  CT of the abdomen and pelvis without contrast suggested diverticulosis.  Patient had a normal EGD and was prepped for colonoscopy when the colonoscopy was but to start he had cardiac arrest total of 3 times in the OR and was intubated and transferred to the ICU included 2 more times in the ICU.  He then was transferred to see [facility].     The portion of the colonoscopy that was able to be performed showed diverticulosis but no active bleeding.  Patient can to the same medicines as he Protonix drip.  Chest x-ray did show some evidence of pulmonary vascular congestion. Review of patient's previous creatinine levels show baseline creatinine in the range of 1.6-1.8.  His creatinine has been in the 1.5-1.6 range on this hospitalization.     The patient underwent cardiac catheterization today.  Nephrology was consulted after the cardiac catheterization was performed.  The patient appeared to have some IV fluids started just before the cardiac catheterization. The results of the cardiac catheterization showed previous CABGs and left main coronary artery with a 70% distal stenosis, heavily calcified. The patient was noted on review of the bypass grafts to have only the LIMA to the LAD patent and  02/22/2024 09:14 PM    LEUKOCYTESUR NEGATIVE 02/22/2024 09:14 PM    UROBILINOGEN Normal 02/22/2024 09:14 PM    BILIRUBINUR NEGATIVE 02/22/2024 09:14 PM    GLUCOSEU 3+ 02/22/2024 09:14 PM    KETUA NEGATIVE 02/22/2024 09:14 PM         Radiology:  Reviewed as available.    Assessment:  1. Chronic kidney disease stage IIIB with baseline creatinine 1.6-1.8, likely secondary to diabetes mellitus and hypertension  2. Ischemic cardiomyopathy  3. Recent cardiac arrest x5 at outside hospital in the setting of an attempted colonoscopy   4. Anemia of chronic disease and blood loss  5. History of previous CABG  6. Cardiac catheterization earlier today with results above  7. Plan for staged PCI of left main coronary artery and left circumflex in the next few days         Plan:  1. Check a renal ultrasound   2. Urinalysis along with random urine for protein and creatinine ratio  3. Check serum protein immunofixation, serum free light chains with ratio, C3, C4, ANCA and ANCA  4. Follow intake and output  5. Resume torsemide 20 mg oral daily starting tomorrow  6. Consider restarting losartan and Farxiga after staged PCI    Thank you for the consultation.  Please do not hesitate to call with questions.    Electronically signed by Kaleb Adams MD on 3/4/2024 at 3:36 PM

## 2024-03-04 NOTE — PROGRESS NOTES
[facility]   Department of Internal Medicine & Critical Care - Staff Internal Medicine Teaching Service     Critical Care - Daily Progress Note      Date and time: 3/4/2024 8:05 AM  Patient's name:  Kb Wilson  Medical Record Number: [id number]  Patient's account/billing number: [id number]  Patient's YOB: 1945  Age: 78 y.o.  Date of Admission: 2/29/2024  3:40 PM  Length of stay during current admission: 4  Primary Care Physician: Viridiana Taveras  ICU Attending Physician: Judson Cortés MD    Code Status: Full Code  Reason for ICU admission: cardiac arrest   Subjective:     Pt was seen and examined at bedside. Vitals stable but requiring levo 2  Labs reviewed.    Extubated on 3/1/24  Only tolerating BiPAP overnight for 30 minutes at at time.    Na 136, K 5, Cr 1.6 (stable), Glucose 248   Increased Lantus to 10 nightly, as glucose as been in 2-300s    WBC 8 > 12  Hgb 8.9, stable  Plt 230    Trop 223 >>> 483, on heparin gtt, planning for cath today    UOP 846cc/24 hr; 0.3cc/kg/hr    Cardiology recs: plan for cath 3/4  GI recs: lower GI bleed, likely diverticular, may advance diet as tolerated, ok for AC, f/u OP with GI    OVERNIGHT EVENTS:           No acute events overnight, did not tolerate bipap    URINE OUTPUT:   [] Good  [x] Low  [] Anuric      CONSULT SERVICES: CARDIOLOGY    BRIEF SUMMARY:    Kb Wilson is a 78 y.o. with a history of:  CAD status post CABG on Plavix  CHF with ejection fraction 20% has AICD  Paroxysmal A-fib on Eliquis  Diabetes mellitus  Hypertension  Stage III kidney disease     Patient presented to [place] emergency department on 2/27/2024 for bright red blood when he wiped when going to the bathroom just prior to presenting to the emergency room.  Concern for GI bleed.  CT abdomen pelvis without contrast suggest diverticulosis.  Patient was admitted for GI consult was started on Protonix infusion.  Patient had EGD that  was normal.  Patient was prepped for colonoscopy today.  Patient was given propofol and the colonoscopy was about to begin when he cardiac arrested.  Cardiac arrested a total of 3 times in the OR.  He was intubated.  He was then transferred to the ICU where he coded 2 more times.  He had a CODE BLUE total of 5 times [facility].  He was transferred to [facility] for cardiac evaluation.     Patient was able to have some of the colonoscopy done that did not show any active bleeding however does show diverticula consistent with diverticulosis.  Recommendations from GI doctor at [facility] patient's hemoglobin drops again to get a CTA abdomen pelvis and transfuse.  Keep hemoglobin above 7.  Patient is on a Protonix drip.     Initial hemoglobin 8.5, white blood cell count 20.8, chest x-ray shows pulmonary vascular congestion    3/1/2024: Extubate, consult GI, wean down pressors.  3/2/2024: Started on clear liquid diet  3/3/2024: NPO at midnight for cath on 3/4    REVIEW OF SYSTEMS  Review of Systems   Constitutional:  Negative for fever.   Respiratory:  Negative for cough and shortness of breath.    Cardiovascular:  Positive for chest pain. Negative for leg swelling.   Gastrointestinal:  Negative for abdominal pain, diarrhea, nausea and vomiting.   Musculoskeletal:  Negative for arthralgias and myalgias.   Psychiatric/Behavioral:  Negative for agitation and confusion.         OBJECTIVE:     VITAL SIGNS:  /85   Pulse 94   Temp 98.8 °F (37.1 °C) (Bladder)   Resp 23   Wt 127.8 kg (281 lb 12 oz)   SpO2 99%   BMI 35.22 kg/m²   Tmax over 24 hours:  Temp (24hrs), Av.3 °F (37.4 °C), Min:98.8 °F (37.1 °C), Max:99.5 °F (37.5 °C)        Intake/Output Summary (Last 24 hours) at 3/4/2024 0805  Last data filed at 3/4/2024 0700  Gross per 24 hour   Intake 1535.88 ml   Output 786 ml   Net 749.88 ml       Wt Readings from Last 3 Encounters:   24 127.8 kg (281 lb 12 oz)   24 123 kg

## 2024-03-04 NOTE — PROCEDURES
[facility]        Date:   3/4/2024  Patient name: Kb Wilson  Date of admission:  2/29/2024  3:40 PM  MRN:   [id number]  YOB: 1945    CARDIAC CATHETERIZATION    Operators:  Primary: Wendy Moreno MD.  Assistant:     Indications for cath: NSTEMI, cardiac arrest    Procedure performed: Cardiac cath.    Access: Left femoral artery      Procedure: After informed consent was obtained with explanation of the risks and benefits, patient was brought to the cath lab. The left groin was prepped and draped in sterile fashion. 1% lidocaine was used for local block. The Femoral artery was cannulated with 6  Fr sheath with brisk arterial blood return. The side port was frequently flushed and aspirated with normal saline.    EBL is 15 mL    Dominance is right    Findings:    Left main: 70% distal stenosis heavily calcified.    LAD: 100% ostial occlusion.  LIMA to the LAD is patent with 0% stenosis.  Mid and distal LAD with luminal irregularities 20 to 30% post anastomosis supplying collaterals into OM1 and right PDA into distal RCA  SVG to diagonal is 100% occluded at proximal anastomosis    LCX: 90% ostial stenosis, 80% long mid stenosis.  SVG to OM 2 is 100% occluded at the proximal anastomosis    RCA: 100% occluded in the mid section  SVG to RPDA is 100% occluded at the proximal anastomosis  RCA territory is getting left-to-right collaterals    The LV gram was not performed    Conclusions:  Left main and multivessel CAD  Only LIMA to the LAD is patent  Occluded SVG to diagonal, SVG to OM 2 and SVG to RPDA  LCx and RCA territories are getting collaterals circulation  LV was not performed    Recommendation:  Medical treatments.  Risk factors modifications.  Staged PCI to left main and LCx in 2 to 3 days due to renal function      Electronically signed by Wendy Moreno MD on 3/4/2024 at 10:26 AM      [facility]  [phone]

## 2024-03-04 NOTE — PLAN OF CARE
Problem: Chronic Conditions and Co-morbidities  Goal: Patient's chronic conditions and co-morbidity symptoms are monitored and maintained or improved  Outcome: Progressing     Problem: Safety - Adult  Goal: Free from fall injury  Outcome: Progressing     Problem: Discharge Planning  Goal: Discharge to home or other facility with appropriate resources  Outcome: Progressing     Problem: Respiratory - Adult  Goal: Achieves optimal ventilation and oxygenation  3/4/2024 0931 by Acosta Elam RN  Outcome: Progressing  3/4/2024 0925 by Kathya Olvera, RCP  Outcome: Progressing     Problem: Pain  Goal: Verbalizes/displays adequate comfort level or baseline comfort level  Outcome: Progressing     Problem: Skin/Tissue Integrity  Goal: Absence of new skin breakdown  Description: 1.  Monitor for areas of redness and/or skin breakdown  2.  Assess vascular access sites hourly  3.  Every 4-6 hours minimum:  Change oxygen saturation probe site  4.  Every 4-6 hours:  If on nasal continuous positive airway pressure, respiratory therapy assess nares and determine need for appliance change or resting period.  Outcome: Progressing     Problem: ABCDS Injury Assessment  Goal: Absence of physical injury  Outcome: Progressing

## 2024-03-04 NOTE — PROGRESS NOTES
[facility]    Progress               Today's Date: 3/4/2024  Patient Name: Kb Wilson  Date of admission: 2/29/2024  3:40 PM  Patient's age: 78 y.o., 1945  Admission Dx: Cardiac arrest (HCC) [I46.9]    Subjective:  Patient seen and examined at bedside.  Used BIPAP transiently overnight. Currently normal sinus rhythm.  Currently on Levophed at 2 for blood pressure support.  Denies any  chest pain.   Troponins trending up- 223-> 483-> 634     Past Medical History:   has a past medical history of CAD (coronary artery disease), CHF (congestive heart failure) ([facility]), Diabetes mellitus ([facility]), and Hypertension.    Past Surgical History:   has a past surgical history that includes Cardiac defibrillator placement; Coronary artery bypass graft; Upper gastrointestinal endoscopy (N/A, 2/28/2024); and Colonoscopy (N/A, 2/29/2024).     Home Medications:    Prior to Admission medications    Medication Sig Start Date End Date Taking? Authorizing Provider   carvedilol (COREG) 3.125 MG tablet Take 1 tablet by mouth 2 times daily (with meals) 2/25/24   Marlena Hedrick MD   aspirin 81 MG chewable tablet Take 1 tablet by mouth daily  Patient not taking: Reported on 2/27/2024 2/26/24   Marlena Hedrick MD   atorvastatin (LIPITOR) 80 MG tablet Take 1 tablet by mouth nightly 2/25/24   Marlena Hedrick MD   calcitRIOL (ROCALTROL) 0.25 MCG capsule Take 1 capsule by mouth as needed Three times weekly 7/8/23   Laura More MD   apixaban (ELIQUIS) 5 MG TABS tablet Take 1 tablet by mouth 2 times daily 7/8/23   Laura More MD   famotidine (PEPCID) 40 MG tablet Take 1 tablet by mouth daily 7/8/23   Laura More MD   OZEMPIC, 0.25 OR 0.5 MG/DOSE, 2 MG/3ML SOPN  1/3/24   Laura More MD   insulin glargine (LANTUS;BASAGLAR) 100 UNIT/ML injection pen Inject 40 Units into the skin daily (with breakfast) 2/8/24   Laura More MD   dapagliflozin (FARXIGA) 10 MG tablet Take 1 tablet by  suppository 650 mg, 650 mg, Rectal, Q6H PRN  pantoprazole (PROTONIX) 40 mg in sodium chloride (PF) 0.9 % 10 mL injection, 40 mg, IntraVENous, BID  glucose chewable tablet 16 g, 4 tablet, Oral, PRN  dextrose bolus 10% 125 mL, 125 mL, IntraVENous, PRN **OR** dextrose bolus 10% 250 mL, 250 mL, IntraVENous, PRN  glucagon injection 1 mg, 1 mg, SubCUTAneous, PRN  dextrose 10 % infusion, , IntraVENous, Continuous PRN  midazolam PF (VERSED) injection 2 mg, 2 mg, IntraVENous, Once    Allergies:  Barium-containing compounds, Iodinated contrast media, and Hydromorphone    Social History:   reports that he has never smoked. He has never used smokeless tobacco. He reports current alcohol use. He reports that he does not use drugs.     Family History: family history is not on file. No h/o sudden cardiac death.No for premature CAD    REVIEW OF SYSTEMS:    Constitutional: there has been no unanticipated weight loss. There's been No change in energy level, No change in activity level.     Eyes: No visual changes or diplopia. No scleral icterus.  ENT: No Headaches  Cardiovascular: No cardiac history  Respiratory: No previous pulmonary problems, No cough  Gastrointestinal: No abdominal pain.  No change in bowel or bladder habits.  Genitourinary: No dysuria, trouble voiding, or hematuria.  Musculoskeletal:  No gait disturbance, No weakness or joint complaints.  Integumentary: No rash or pruritis.  Neurological: No headache, diplopia, change in muscle strength, numbness or tingling. No change in gait, balance, coordination, mood, affect, memory, mentation, behavior.  Psychiatric: No anxiety, or depression.  Endocrine: No temperature intolerance. No excessive thirst, fluid intake, or urination. No tremor.  Hematologic/Lymphatic: No abnormal bruising or bleeding, blood clots or swollen lymph nodes.  Allergic/Immunologic: No nasal congestion or hives.      PHYSICAL EXAM:      /74   Pulse 92   Temp 98.8 °F (37.1 °C)   Resp 21   Wt  Diverticular hemorrhage    Acute blood loss anemia    ACS (acute coronary syndrome) ([facility])       IMPRESSION:    Cardiac arrest, likely PEA/asystole, during colonoscopy  NSTEMI, likely type II in the setting of cardiac arrest, LUZMA, and anemia, but cannot rule out type I.  GI bleeding - only partial colonoscopy completed  Shock, cardiogenic versus hypovolemic?  CAD s/p CABG (2008) and previous PCI  Ischemic cardiomyopathy (EF 25% s/p AICD in place),  LUZMA on CKD stage III  Acute hypoxic respiratory failure  Anemia    RECOMMENDATIONS:  Planned for coronary angiogram today for definitive diagnosis in the setting of NSTEMI  GI note reviewed, per GI okay for anticoagulation and antiplatelets.   Agree with midodrine, Wean vasopressors as tolerated.   Follow-up 2D echo to evaluate for underlying wall motion abnormalities, structural or valvular heart disease.  Continue rest of the management as per the critical care team.  Further recommendations to follow after the cardiac cath.     Karen Marie MD  Internal Medicine Resident, PGY-3  [facility]; [place], [place]  3/4/2024, 7:39 AM      Attending Physician Statement  I have discussed the care of the patient, including pertinent history and exam findings, with the resident. I have seen and examined the patient and the key elements of all parts of the encounter have been performed by me. I agree with the assessment, plan and orders as documented by the resident.  Patient had a heart cath done which shows multivessel disease left main all the grafts are occluded except LIMA to the LAD  Plan is to do PCI of the left main and left circumflex in 2 to 3 days  Patient is awake alert on the pain at the rib cage area due to CPR and broken ribs  Please watch BUN and creatinine tomorrow  It would be better if we can involve nephrology for another heart cath in 2 to 3 days  Anoop Bloom MD

## 2024-03-05 ENCOUNTER — APPOINTMENT (OUTPATIENT)
Dept: ULTRASOUND IMAGING | Age: 79
DRG: 981 | End: 2024-03-05
Attending: INTERNAL MEDICINE
Payer: COMMERCIAL

## 2024-03-05 LAB
ALBUMIN SERPL-MCNC: 2.6 G/DL (ref 3.5–5.2)
ALBUMIN/GLOB SERPL: 1 {RATIO} (ref 1–2.5)
ALP SERPL-CCNC: 58 U/L (ref 40–129)
ALT SERPL-CCNC: 29 U/L (ref 5–41)
ANION GAP SERPL CALCULATED.3IONS-SCNC: 7 MMOL/L (ref 9–17)
ANTI-XA UNFRAC HEPARIN: 0.51 IU/L
AST SERPL-CCNC: 13 U/L
BASOPHILS # BLD: <0.03 K/UL (ref 0–0.2)
BASOPHILS NFR BLD: 0 % (ref 0–2)
BILIRUB DIRECT SERPL-MCNC: 0.2 MG/DL
BILIRUB INDIRECT SERPL-MCNC: 0.4 MG/DL (ref 0–1)
BILIRUB SERPL-MCNC: 0.6 MG/DL (ref 0.3–1.2)
BUN SERPL-MCNC: 39 MG/DL (ref 8–23)
C3 SERPL-MCNC: 112 MG/DL (ref 90–180)
C4 SERPL-MCNC: 20 MG/DL (ref 10–40)
CALCIUM SERPL-MCNC: 8 MG/DL (ref 8.6–10.4)
CHLORIDE SERPL-SCNC: 105 MMOL/L (ref 98–107)
CO2 SERPL-SCNC: 26 MMOL/L (ref 20–31)
CREAT SERPL-MCNC: 1.6 MG/DL (ref 0.7–1.2)
EOSINOPHIL # BLD: <0.03 K/UL (ref 0–0.44)
EOSINOPHILS RELATIVE PERCENT: 0 % (ref 1–4)
ERYTHROCYTE [DISTWIDTH] IN BLOOD BY AUTOMATED COUNT: 19.3 % (ref 11.8–14.4)
EST. AVERAGE GLUCOSE BLD GHB EST-MCNC: 171 MG/DL
GFR SERPL CREATININE-BSD FRML MDRD: 44 ML/MIN/1.73M2
GLUCOSE BLD-MCNC: 122 MG/DL (ref 75–110)
GLUCOSE BLD-MCNC: 123 MG/DL (ref 75–110)
GLUCOSE BLD-MCNC: 125 MG/DL (ref 75–110)
GLUCOSE BLD-MCNC: 134 MG/DL (ref 75–110)
GLUCOSE BLD-MCNC: 135 MG/DL (ref 75–110)
GLUCOSE BLD-MCNC: 139 MG/DL (ref 75–110)
GLUCOSE BLD-MCNC: 150 MG/DL (ref 75–110)
GLUCOSE BLD-MCNC: 151 MG/DL (ref 75–110)
GLUCOSE BLD-MCNC: 160 MG/DL (ref 75–110)
GLUCOSE BLD-MCNC: 162 MG/DL (ref 75–110)
GLUCOSE BLD-MCNC: 178 MG/DL (ref 75–110)
GLUCOSE BLD-MCNC: 194 MG/DL (ref 75–110)
GLUCOSE BLD-MCNC: 197 MG/DL (ref 75–110)
GLUCOSE BLD-MCNC: 212 MG/DL (ref 75–110)
GLUCOSE BLD-MCNC: 243 MG/DL (ref 75–110)
GLUCOSE BLD-MCNC: 277 MG/DL (ref 75–110)
GLUCOSE BLD-MCNC: 278 MG/DL (ref 75–110)
GLUCOSE BLD-MCNC: 281 MG/DL (ref 75–110)
GLUCOSE BLD-MCNC: 335 MG/DL (ref 75–110)
GLUCOSE BLD-MCNC: 337 MG/DL (ref 75–110)
GLUCOSE SERPL-MCNC: 194 MG/DL (ref 70–99)
HBA1C MFR BLD: 7.6 % (ref 4–6)
HCT VFR BLD AUTO: 26.2 % (ref 40.7–50.3)
HCT VFR BLD AUTO: 26.8 % (ref 40.7–50.3)
HCT VFR BLD AUTO: 27.8 % (ref 40.7–50.3)
HCT VFR BLD AUTO: 28.9 % (ref 40.7–50.3)
HGB BLD-MCNC: 7.7 G/DL (ref 13–17)
HGB BLD-MCNC: 7.8 G/DL (ref 13–17)
HGB BLD-MCNC: 8.3 G/DL (ref 13–17)
HGB BLD-MCNC: 8.4 G/DL (ref 13–17)
IMM GRANULOCYTES # BLD AUTO: 0.07 K/UL (ref 0–0.3)
IMM GRANULOCYTES NFR BLD: 1 %
LYMPHOCYTES NFR BLD: 0.83 K/UL (ref 1.1–3.7)
LYMPHOCYTES RELATIVE PERCENT: 9 % (ref 24–43)
MCH RBC QN AUTO: 28.6 PG (ref 25.2–33.5)
MCHC RBC AUTO-ENTMCNC: 29.8 G/DL (ref 28.4–34.8)
MCV RBC AUTO: 96 FL (ref 82.6–102.9)
MONOCYTES NFR BLD: 0.33 K/UL (ref 0.1–1.2)
MONOCYTES NFR BLD: 3 % (ref 3–12)
NEUTROPHILS NFR BLD: 87 % (ref 36–65)
NEUTS SEG NFR BLD: 8.42 K/UL (ref 1.5–8.1)
NRBC BLD-RTO: 1 PER 100 WBC
PARTIAL THROMBOPLASTIN TIME: 156.7 SEC (ref 23–36.5)
PARTIAL THROMBOPLASTIN TIME: 34.2 SEC (ref 23–36.5)
PHOSPHATE SERPL-MCNC: 2.5 MG/DL (ref 2.5–4.5)
PLATELET # BLD AUTO: 227 K/UL (ref 138–453)
PMV BLD AUTO: 10.3 FL (ref 8.1–13.5)
POTASSIUM SERPL-SCNC: 5 MMOL/L (ref 3.7–5.3)
PROT SERPL-MCNC: 5.1 G/DL (ref 6.4–8.3)
PTH-INTACT SERPL-MCNC: 84.7 PG/ML (ref 14–72)
RBC # BLD AUTO: 2.73 M/UL (ref 4.21–5.77)
RBC # BLD: ABNORMAL 10*6/UL
SODIUM SERPL-SCNC: 138 MMOL/L (ref 135–144)
TROPONIN I SERPL HS-MCNC: 418 NG/L (ref 0–22)
TROPONIN I SERPL HS-MCNC: 499 NG/L (ref 0–22)
TROPONIN I SERPL HS-MCNC: 500 NG/L (ref 0–22)
TROPONIN I SERPL HS-MCNC: 554 NG/L (ref 0–22)
WBC OTHER # BLD: 9.7 K/UL (ref 3.5–11.3)

## 2024-03-05 PROCEDURE — 86334 IMMUNOFIX E-PHORESIS SERUM: CPT

## 2024-03-05 PROCEDURE — 84484 ASSAY OF TROPONIN QUANT: CPT

## 2024-03-05 PROCEDURE — 83516 IMMUNOASSAY NONANTIBODY: CPT

## 2024-03-05 PROCEDURE — 2700000000 HC OXYGEN THERAPY PER DAY

## 2024-03-05 PROCEDURE — 83036 HEMOGLOBIN GLYCOSYLATED A1C: CPT

## 2024-03-05 PROCEDURE — 86160 COMPLEMENT ANTIGEN: CPT

## 2024-03-05 PROCEDURE — 6370000000 HC RX 637 (ALT 250 FOR IP): Performed by: STUDENT IN AN ORGANIZED HEALTH CARE EDUCATION/TRAINING PROGRAM

## 2024-03-05 PROCEDURE — 86038 ANTINUCLEAR ANTIBODIES: CPT

## 2024-03-05 PROCEDURE — 82947 ASSAY GLUCOSE BLOOD QUANT: CPT

## 2024-03-05 PROCEDURE — 2580000003 HC RX 258

## 2024-03-05 PROCEDURE — 99232 SBSQ HOSP IP/OBS MODERATE 35: CPT | Performed by: INTERNAL MEDICINE

## 2024-03-05 PROCEDURE — 2000000000 HC ICU R&B

## 2024-03-05 PROCEDURE — 36415 COLL VENOUS BLD VENIPUNCTURE: CPT

## 2024-03-05 PROCEDURE — 85730 THROMBOPLASTIN TIME PARTIAL: CPT

## 2024-03-05 PROCEDURE — 99233 SBSQ HOSP IP/OBS HIGH 50: CPT | Performed by: INTERNAL MEDICINE

## 2024-03-05 PROCEDURE — 6370000000 HC RX 637 (ALT 250 FOR IP)

## 2024-03-05 PROCEDURE — 6370000000 HC RX 637 (ALT 250 FOR IP): Performed by: INTERNAL MEDICINE

## 2024-03-05 PROCEDURE — 94660 CPAP INITIATION&MGMT: CPT

## 2024-03-05 PROCEDURE — A4216 STERILE WATER/SALINE, 10 ML: HCPCS

## 2024-03-05 PROCEDURE — 85025 COMPLETE CBC W/AUTO DIFF WBC: CPT

## 2024-03-05 PROCEDURE — 85520 HEPARIN ASSAY: CPT

## 2024-03-05 PROCEDURE — 93005 ELECTROCARDIOGRAM TRACING: CPT

## 2024-03-05 PROCEDURE — 86225 DNA ANTIBODY NATIVE: CPT

## 2024-03-05 PROCEDURE — 76770 US EXAM ABDO BACK WALL COMP: CPT

## 2024-03-05 PROCEDURE — 83970 ASSAY OF PARATHORMONE: CPT

## 2024-03-05 PROCEDURE — 83521 IG LIGHT CHAINS FREE EACH: CPT

## 2024-03-05 PROCEDURE — 99291 CRITICAL CARE FIRST HOUR: CPT | Performed by: INTERNAL MEDICINE

## 2024-03-05 PROCEDURE — 85018 HEMOGLOBIN: CPT

## 2024-03-05 PROCEDURE — 80048 BASIC METABOLIC PNL TOTAL CA: CPT

## 2024-03-05 PROCEDURE — C9113 INJ PANTOPRAZOLE SODIUM, VIA: HCPCS

## 2024-03-05 PROCEDURE — 85014 HEMATOCRIT: CPT

## 2024-03-05 PROCEDURE — 6360000002 HC RX W HCPCS

## 2024-03-05 PROCEDURE — 94761 N-INVAS EAR/PLS OXIMETRY MLT: CPT

## 2024-03-05 PROCEDURE — 84100 ASSAY OF PHOSPHORUS: CPT

## 2024-03-05 PROCEDURE — 93005 ELECTROCARDIOGRAM TRACING: CPT | Performed by: STUDENT IN AN ORGANIZED HEALTH CARE EDUCATION/TRAINING PROGRAM

## 2024-03-05 PROCEDURE — 2500000003 HC RX 250 WO HCPCS

## 2024-03-05 PROCEDURE — 80076 HEPATIC FUNCTION PANEL: CPT

## 2024-03-05 RX ORDER — INSULIN GLARGINE 100 [IU]/ML
15 INJECTION, SOLUTION SUBCUTANEOUS NIGHTLY
Status: DISCONTINUED | OUTPATIENT
Start: 2024-03-05 | End: 2024-03-07

## 2024-03-05 RX ADMIN — HEPARIN SODIUM 10 UNITS/KG/HR: 10000 INJECTION, SOLUTION INTRAVENOUS at 21:06

## 2024-03-05 RX ADMIN — INSULIN GLARGINE 15 UNITS: 100 INJECTION, SOLUTION SUBCUTANEOUS at 20:07

## 2024-03-05 RX ADMIN — MIDODRINE HYDROCHLORIDE 10 MG: 5 TABLET ORAL at 08:38

## 2024-03-05 RX ADMIN — ATORVASTATIN CALCIUM 80 MG: 80 TABLET, FILM COATED ORAL at 20:06

## 2024-03-05 RX ADMIN — MIDODRINE HYDROCHLORIDE 10 MG: 5 TABLET ORAL at 18:17

## 2024-03-05 RX ADMIN — SODIUM CHLORIDE, PRESERVATIVE FREE 10 ML: 5 INJECTION INTRAVENOUS at 20:05

## 2024-03-05 RX ADMIN — PANTOPRAZOLE SODIUM 40 MG: 40 INJECTION, POWDER, FOR SOLUTION INTRAVENOUS at 20:07

## 2024-03-05 RX ADMIN — SODIUM CHLORIDE, PRESERVATIVE FREE 10 ML: 5 INJECTION INTRAVENOUS at 20:07

## 2024-03-05 RX ADMIN — SODIUM CHLORIDE, PRESERVATIVE FREE 10 ML: 5 INJECTION INTRAVENOUS at 08:43

## 2024-03-05 RX ADMIN — Medication 5 MCG/MIN: at 06:22

## 2024-03-05 RX ADMIN — MIDODRINE HYDROCHLORIDE 10 MG: 5 TABLET ORAL at 12:41

## 2024-03-05 RX ADMIN — ASPIRIN 81 MG 81 MG: 81 TABLET ORAL at 08:38

## 2024-03-05 RX ADMIN — TORSEMIDE 20 MG: 20 TABLET ORAL at 08:38

## 2024-03-05 RX ADMIN — SODIUM CHLORIDE 3.97 UNITS/HR: 9 INJECTION, SOLUTION INTRAVENOUS at 08:33

## 2024-03-05 RX ADMIN — PANTOPRAZOLE SODIUM 40 MG: 40 INJECTION, POWDER, FOR SOLUTION INTRAVENOUS at 08:38

## 2024-03-05 RX ADMIN — HEPARIN SODIUM 9 UNITS/KG/HR: 10000 INJECTION, SOLUTION INTRAVENOUS at 01:38

## 2024-03-05 RX ADMIN — SODIUM CHLORIDE, PRESERVATIVE FREE 10 ML: 5 INJECTION INTRAVENOUS at 08:06

## 2024-03-05 RX ADMIN — HEPARIN SODIUM 4000 UNITS: 1000 INJECTION INTRAVENOUS; SUBCUTANEOUS at 02:30

## 2024-03-05 NOTE — PLAN OF CARE
Problem: Chronic Conditions and Co-morbidities  Goal: Patient's chronic conditions and co-morbidity symptoms are monitored and maintained or improved  Outcome: Progressing     Problem: Safety - Adult  Goal: Free from fall injury  Outcome: Progressing     Problem: Discharge Planning  Goal: Discharge to home or other facility with appropriate resources  Outcome: Progressing     Problem: Respiratory - Adult  Goal: Achieves optimal ventilation and oxygenation  Outcome: Progressing     Problem: Pain  Goal: Verbalizes/displays adequate comfort level or baseline comfort level  Outcome: Progressing     Problem: Skin/Tissue Integrity  Goal: Absence of new skin breakdown  Description: 1.  Monitor for areas of redness and/or skin breakdown  2.  Assess vascular access sites hourly  3.  Every 4-6 hours minimum:  Change oxygen saturation probe site  4.  Every 4-6 hours:  If on nasal continuous positive airway pressure, respiratory therapy assess nares and determine need for appliance change or resting period.  Outcome: Progressing     Problem: ABCDS Injury Assessment  Goal: Absence of physical injury  Outcome: Progressing

## 2024-03-05 NOTE — PROGRESS NOTES
[facility]  [place]    Shift date: 3/4/2024  Shift day: Monday   Shift # 2    Room # 3003/3003-01   Name: Kb Wilson                Restorationism: [facility]   Place of Restorationist: Unknown    Referral: Routine Visit    Admit Date & Time: 2/29/2024  3:40 PM    Assessment:  Kb Wilson is a 78 y.o. male in the hospital because of cardiac arrest. Upon entering the room writer observes patient lying in bed. Patient presented as calm and coping and engaged briefly in conversation, sharing thoughts, feelings, hopes, and concerns.      Intervention:  Writer introduced self and title as , present to offer emotional and spiritual support.  provided active listening; caring presence; a parting blessing.      Outcome:  Patient was receptive to and expressed gratitude for  visit.    Plan:  Chaplains will remain available to offer spiritual and emotional support as needed.      Electronically signed by Masha Chilel [facility], , on 3/4/2024 at 10:59 PM.  [facility]  [phone]     03/04/24 9747   Encounter Summary   Encounter Overview/Reason  Follow-up   Service Provided For: Patient   Referral/Consult From: Rounding   Support System Family members   Last Encounter  03/04/24   Complexity of Encounter Low   Begin Time 2100   End Time  2106   Total Time Calculated 6 min   Spiritual/Emotional needs   Type Spiritual Support;Other (comment)  (Emotional Support)   Assessment/Intervention/Outcome   Assessment Calm;Coping   Intervention Active listening;Explored/Affirmed feelings, thoughts, concerns;Explored Coping Skills/Resources   Outcome Engaged in conversation;Expressed feelings, needs, and concerns;Encouraged;Receptive

## 2024-03-05 NOTE — PROGRESS NOTES
Nephrology Progress Note      SUBJECTIVE       The patient was seen and examined. No acute issues overnite. Stable hemodynamics .  Serum creatinine is stable at 1.6.  Breathing is reasonable.  He is on torsemide.  Torsemide dose is his home dose.  Angiotensin receptor blocker remains on hold.  Elena catheter is in place.  Cardiology is planning a staged PCI likely 3/6 or 3/7/2024.    No chest pain or shortness breath or nausea or vomiting.         OBJECTIVE      CURRENT TEMPERATURE:  Temp: 97.7 °F (36.5 °C)  MAXIMUM TEMPERATURE OVER 24HRS:  Temp (24hrs), Av.4 °F (36.9 °C), Min:97.5 °F (36.4 °C), Max:99.1 °F (37.3 °C)    CURRENT RESPIRATORY RATE:  Respirations: 21  CURRENT PULSE:  Pulse: 58  CURRENT BLOOD PRESSURE:  BP: (!) 105/58  24HR BLOOD PRESSURE RANGE:  Systolic (24hrs), Av , Min:68 , Max:143   ; Diastolic (24hrs), Av, Min:41, Max:132    24HR INTAKE/OUTPUT:    Intake/Output Summary (Last 24 hours) at 3/5/2024 1010  Last data filed at 3/5/2024 0900  Gross per 24 hour   Intake 1105.29 ml   Output 941 ml   Net 164.29 ml     WEIGHT :Patient Vitals for the past 96 hrs (Last 3 readings):   Weight   24 0436 (!) 138.3 kg (304 lb 14.3 oz)   24 1852 127.5 kg (281 lb)   24 0600 127.8 kg (281 lb 12 oz)     PHYSICAL EXAM      GENERAL APPEARANCE: Awake, alert, in no acute distress  SKIN: warm and dry, no rash or erythema  EYES: conjunctivae pale and sclera anicteric  ENT: no thrush, moist mucus membranes  NECK:   No JVD.  Trachea is midline, no accessory muscle use  PULMONARY: lungs are clear to auscultation. No wheezing, rales or rhonchi  CARDIOVASCULAR: Regular rate and rhythm with positive S1 and S2 and no rubs  ABDOMEN: soft nontender, bowel sounds present,  no ascites.       EXTREMITIES: no cyanosis, clubbing or edema     CURRENT MEDICATIONS      insulin glargine (LANTUS) injection vial 10 Units, Nightly  atorvastatin (LIPITOR) tablet 80 mg, Nightly  torsemide (DEMADEX) tablet 20 mg,  29.0* 28.9* 26.2*   .1  --  100.0  --   --   --  96.0   MCH 28.7  --  28.8  --   --   --  28.6   MCHC 28.1*  --  28.8  --   --   --  29.8   RDW 19.2*  --  19.6*  --   --   --  19.3*     --  230  --   --   --  227   MPV 9.8  --  10.0  --   --   --  10.3    < > = values in this interval not displayed.      BMP:   Recent Labs     03/03/24  1801 03/04/24  0438 03/05/24  0547    136 138   K 4.7 5.0 5.0    104 105   CO2 23 23 26   BUN 33* 38* 39*   CREATININE 1.5* 1.6* 1.6*   GLUCOSE 310* 248* 194*   CALCIUM 7.8* 8.3* 8.0*      BNP:No results found for: \"BNP\"  PHOSPHORUS:    Recent Labs     03/05/24  0547   PHOS 2.5     MAGNESIUM:   Recent Labs     03/03/24  1019   MG 2.4     ALBUMIN:   Recent Labs     03/05/24  0547   LABALBU 2.6*     IRON:    Lab Results   Component Value Date/Time    IRON 47 02/28/2024 03:45 AM     IRON SATURATION:  No components found for: \"LABIRON\"  TIBC:    Lab Results   Component Value Date/Time    TIBC 183 02/28/2024 03:45 AM     FERRITIN:  No results found for: \"FERRITIN\"  ANCA: No results found for: \"ANCA\"    SPEP:   Lab Results   Component Value Date/Time    PROT 5.1 03/05/2024 05:47 AM     UPEP: No results found for: \"TPU\"   HEPBSAG:No results found for: \"HEPBSAG\"  HEPCAB:No results found for: \"HEPCAB\"  C3:   Lab Results   Component Value Date    C3 112 03/05/2024     C4:   Lab Results   Component Value Date    C4 20 03/05/2024     MPO ANCA: No results found for: \"MPO\" .  PR3 ANCA:  No results found for: \"PR3\"  URINE SODIUM:  No results found for: \"ALFA\"   URINE CREATININE:    Lab Results   Component Value Date/Time    LABCREA 109.7 03/04/2024 09:39 PM     URINE EOSINOPHILS: No results found for: \"UREO\"  URINE PROTEIN:  No results found for: \"TPU\"  URINALYSIS:  U/A:   Lab Results   Component Value Date/Time    NITRU NEGATIVE 03/04/2024 09:39 PM    COLORU Yellow 03/04/2024 09:39 PM    PHUR 5.5 03/04/2024 09:39 PM    WBCUA 10 TO 20 03/04/2024 09:39 PM    RBCUA 50

## 2024-03-05 NOTE — PROGRESS NOTES
[facility]   Department of Internal Medicine & Critical Care - Staff Internal Medicine Teaching Service     Critical Care - Daily Progress Note      Date and time: 3/5/2024 7:57 AM  Patient's name:  Kb Wilson  Medical Record Number: [id number]  Patient's account/billing number: [id number]  Patient's YOB: 1945  Age: 78 y.o.  Date of Admission: 2/29/2024  3:40 PM  Length of stay during current admission: 5  Primary Care Physician: Viridiana Taveras  ICU Attending Physician: Judson Cortés MD    Code Status: Full Code  Reason for ICU admission: cardiac arrest   Subjective:     Pt was seen and examined at bedside. Vitals stable but requiring levo 5  Labs reviewed.    Extubated on 3/1/24  Minimally tolerant of BIPAP    Na 138, K 5, Cr 1.6 (stable), Glucose 194  Started on insulin gtt overnight     WBC stable 9.7  Hgb 78, stable  Plt 227    Trop 223 >>> 483 > 500, on heparin gtt  Planning for staged PCI    UOP 906cc/24 hr; 0.27cc/kg/hr    Cardiology recs: plan for cath 3/4  GI recs: lower GI bleed, likely diverticular, may advance diet as tolerated, ok for AC, f/u OP with GI      OVERNIGHT EVENTS:         Cath performed yesterday, only LIMA patent, planning for staged PCI  Overnight sugars continued to increase into 400s. Started on insulin gtt    URINE OUTPUT:   [] Good  [x] Low  [] Anuric      CONSULT SERVICES: CARDIOLOGY    BRIEF SUMMARY:    Kb Wilson is a 78 y.o. with a history of:  CAD status post CABG on Plavix  CHF with ejection fraction 20% has AICD  Paroxysmal A-fib on Eliquis  Diabetes mellitus  Hypertension  Stage III kidney disease     Patient presented to [place] emergency department on 2/27/2024 for bright red blood when he wiped when going to the bathroom just prior to presenting to the emergency room.  Concern for GI bleed.  CT abdomen pelvis without contrast suggest diverticulosis.  Patient was admitted for GI consult was started  occurred.      Attending Physician Statement  I have discussed the care of Kb Wilson, including pertinent history and exam findings with the resident. I have reviewed the key elements of all parts of the encounter with the resident. I have seen and examined the patient with the resident.  I agree with the assessment and plan and status of the problem list as documented.    I saw the patient during the rounds today, chart reviewed.  Overnight patient was started on insulin drip as his blood sugar was high his blood sugar was 215 units and insulin drip.  He is on low-dose Levophed unable to wean off Levophed.  Cardiac cath report seen and cardiology planning to do a staged PCI.  Scheduled for some IV started by nephrology creatinine stable at 1.6.  He is on BiPAP alternating with nasal cannula and is off BiPAP he is on 4 L.  His hemoglobin has been stable he is currently getting hemoglobin check every 6 hours.  He is on heparin drip.  Continue with aspirin and statin  CXR tomorrow  Discussed with nursing staff, treatment and plan discussed.      Total critical care time caring for this patient with life threatening, unstable organ failure, including direct patient contact, management of life support systems, review of data including imaging and labs, discussions with other team members and physicians at least 35  Min so far today, excluding procedures.       Please note that this chart was generated using voice recognition Dragon dictation software. Although every effort was made to ensure the accuracy of this automated transcription, some errors in transcription may have occurred.     Juan Luis Zelaya MD  3/5/2024 2:52 PM

## 2024-03-05 NOTE — PROGRESS NOTES
[facility]    Progress               Today's Date: 3/5/2024  Patient Name: Kb Wilson  Date of admission: 2/29/2024  3:40 PM  Patient's age: 78 y.o., 1945  Admission Dx: Cardiac arrest (HCC) [I46.9]    Subjective:  Patient seen and examined at bedside.  Used BIPAP overnight. Still on bipap.   Currently normal sinus rhythm, with PACs  Off pressure support.   He was started on insulin ggt overnight because of hyperglycemia.   Denies any chest pain.   Underwent cardiac cath yesterday which showed Left main and MVCAD. Only LIMA to LAD graft is patent. Occluded SVG to diagonal, OM2 and RPDA.  Nephrology was consulted yesterday. Home torsemide resumed  Stable Kidney functions, he is cleared for cardiac cath from nephrology standpoint.   2Decho result obtained this admission: EF of 20 - 25%, with moderate asymmetric hypertrophy. Unable to assess wall motion. Abnormal diastolic function    Past Medical History:   has a past medical history of CAD (coronary artery disease), CHF (congestive heart failure) ([facility]), Diabetes mellitus ([facility]), and Hypertension.    Past Surgical History:   has a past surgical history that includes Cardiac defibrillator placement; Coronary artery bypass graft; Upper gastrointestinal endoscopy (N/A, 2/28/2024); and Colonoscopy (N/A, 2/29/2024).     Home Medications:    Prior to Admission medications    Medication Sig Start Date End Date Taking? Authorizing Provider   carvedilol (COREG) 3.125 MG tablet Take 1 tablet by mouth 2 times daily (with meals) 2/25/24   Marlena Hedrick MD   aspirin 81 MG chewable tablet Take 1 tablet by mouth daily  Patient not taking: Reported on 2/27/2024 2/26/24   Marlena Hedrick MD   atorvastatin (LIPITOR) 80 MG tablet Take 1 tablet by mouth nightly 2/25/24   Marlena Hedrick MD   calcitRIOL (ROCALTROL) 0.25 MCG capsule Take 1 capsule by mouth as needed Three times weekly 7/8/23   ProviderLaura MD   apixaban (ELIQUIS) 5 MG TABS tablet Take      EKG:   nonspecific ST and T waves changes, unchanged from previous tracings.    ECHO:   reviewed.   3/1/24     Left Ventricle: Severely reduced left ventricular systolic function with a visually estimated EF of 20 - 25%. Left ventricle is mildly dilated. Findings consistent with moderate asymmetric hypertrophy. Unable to assess wall motion. Abnormal diastolic function.    Right Ventricle: Right ventricle is severely dilated. Lead present in the right ventricle. Mildly reduced systolic function. TAPSE is abnormal. TDI systolic excursion is abnormal.    Aortic Valve: Trileaflet valve. Mild sclerosis of the aortic valve cusp.    Mitral Valve: Mild to moderate regurgitation.    Tricuspid Valve: Moderate regurgitation. Moderately elevated RVSP, consistent with moderate pulmonary hypertension. The estimated RVSP is 58 mmHg.    Left Atrium: Left atrium is dilated.    Right Atrium: Lead present in the right atrium. Right atrium is severely dilated.    Aorta: Mildly dilated aortic root. Ao root diameter is 4.0 cm. Dilated ascending aorta. Ao ascending diameter is 4.6 cm.    Image quality is technically difficult. Technically difficult study with poor endocardial visualization.    Echo complete W/O contrast  Result Date: 12/8/2023    Left Ventricle: Left ventricle is mildly dilated. There is mild to moderately increased wall thickness/hypertrophy. Systolic function is severely decreased with an ejection fraction of 20-25%. See wall score diagram for wall motion abnormalities. Grade III diastolic dysfunction (restrictive pattern) is present. Lateral E' is 6.35 cm/s. Medial E' is 2.69 cm/s.   Left Atrium: Left atrium is moderately dilated. Left atrium volume index is moderately increased. The left atrial volume index is 44.0 mL/m2.   Right Atrium: Right atrium is mildly dilated. The right atrial area is 25.0 cm2.   Aortic Valve: There is mild regurgitation. There is no evidence of aortic valve stenosis.   Mitral Valve:  There is moderate regurgitation. There is no evidence of mitral valve stenosis.   Tricuspid Valve: There is mild regurgitation. There is no evidence of tricuspid valve stenosis. The right ventricular systolic pressure is moderately elevated. RVSP calculated at 46 mmHg. RVSP is based on RA pressure of 8 mmHg. There is moderate pulmonary hypertension.     Stress Test:   not obtained.    Cardiac Angiography:   not obtained.    Labs:     CBC:   Recent Labs     03/04/24 0438 03/04/24  1037 03/05/24  0132 03/05/24  0547   WBC 12.2*  --   --  9.7   HGB 8.9*   < > 8.4* 7.8*   HCT 30.9*   < > 28.9* 26.2*     --   --  227    < > = values in this interval not displayed.       BMP:   Recent Labs     03/04/24 0438 03/05/24  0547    138   K 5.0 5.0   CO2 23 26   BUN 38* 39*   CREATININE 1.6* 1.6*   LABGLOM 44* 44*   GLUCOSE 248* 194*       BNP: No results for input(s): \"BNP\" in the last 72 hours.  PT/INR:   Recent Labs     03/03/24  1156   PROTIME 13.9   INR 1.1       APTT:  Recent Labs     03/04/24 2004 03/05/24 0132   APTT 79.5* 34.2       CARDIAC ENZYMES:No results for input(s): \"CKTOTAL\", \"CKMB\", \"CKMBINDEX\", \"TROPONINI\" in the last 72 hours.  FASTING LIPID PANEL:  Lab Results   Component Value Date/Time    HDL 47 02/23/2024 05:00 AM    TRIG 136 02/23/2024 05:00 AM     LIVER PROFILE:  Recent Labs     03/05/24  0547   AST 13   ALT 29   LABALBU 2.6*             Patient Active Problem List   Diagnosis    Fall    Hypertension    Diabetes mellitus (HCC)    CHF (congestive heart failure) (HCC)    CAD, multiple vessel    Elevated troponin    Ischemic cardiomyopathy    AICD (automatic cardioverter/defibrillator) present    GI bleed    Rectal bleeding    Acute anemia    Lower abdominal pain    Diverticulitis    Hx of CABG    PAD (peripheral artery disease) (HCC)    Diabetic neuropathy (HCC)    PAF (paroxysmal atrial fibrillation) ([facility])    Stage 3 chronic kidney disease (HCC)    Cardiac arrest ([facility])    NSTEMI (non-ST

## 2024-03-05 NOTE — PLAN OF CARE
Problem: Chronic Conditions and Co-morbidities  Goal: Patient's chronic conditions and co-morbidity symptoms are monitored and maintained or improved  3/5/2024 0951 by Brian Lyn RN  Outcome: Progressing  3/5/2024 0646 by Jazmine Lowe RN  Outcome: Progressing     Problem: Safety - Adult  Goal: Free from fall injury  3/5/2024 0951 by Brian Lyn RN  Outcome: Progressing  3/5/2024 0646 by Jazmine Lowe RN  Outcome: Progressing     Problem: Discharge Planning  Goal: Discharge to home or other facility with appropriate resources  3/5/2024 0951 by Brian Lyn RN  Outcome: Progressing  3/5/2024 0646 by Jazmine Lowe RN  Outcome: Progressing     Problem: Respiratory - Adult  Goal: Achieves optimal ventilation and oxygenation  3/5/2024 0951 by Brian Lyn RN  Outcome: Progressing  3/5/2024 0646 by Jazmine Lowe RN  Outcome: Progressing     Problem: Pain  Goal: Verbalizes/displays adequate comfort level or baseline comfort level  3/5/2024 0951 by Brian Lyn RN  Outcome: Progressing  3/5/2024 0646 by Jazmine Lowe RN  Outcome: Progressing     Problem: Skin/Tissue Integrity  Goal: Absence of new skin breakdown  3/5/2024 0951 by Brian Lyn RN  Outcome: Progressing  3/5/2024 0646 by Jazmine Lowe RN  Outcome: Progressing     Problem: ABCDS Injury Assessment  Goal: Absence of physical injury  3/5/2024 0951 by Brian Lyn RN  Outcome: Progressing  3/5/2024 0646 by aJzmine Lowe RN  Outcome: Progressing

## 2024-03-06 ENCOUNTER — APPOINTMENT (OUTPATIENT)
Dept: GENERAL RADIOLOGY | Age: 79
DRG: 981 | End: 2024-03-06
Attending: INTERNAL MEDICINE
Payer: COMMERCIAL

## 2024-03-06 LAB
ANION GAP SERPL CALCULATED.3IONS-SCNC: 7 MMOL/L (ref 9–16)
ANTI-XA UNFRAC HEPARIN: 0.32 IU/L
ANTI-XA UNFRAC HEPARIN: 0.5 IU/L
BASOPHILS # BLD: 0 K/UL (ref 0–0.2)
BASOPHILS NFR BLD: 0 % (ref 0–2)
BUN SERPL-MCNC: 48 MG/DL (ref 8–23)
CALCIUM SERPL-MCNC: 7.9 MG/DL (ref 8.6–10.4)
CHLORIDE SERPL-SCNC: 108 MMOL/L (ref 98–107)
CO2 SERPL-SCNC: 26 MMOL/L (ref 20–31)
CREAT SERPL-MCNC: 1.7 MG/DL (ref 0.7–1.2)
EKG ATRIAL RATE: 110 BPM
EKG ATRIAL RATE: 79 BPM
EKG ATRIAL RATE: 80 BPM
EKG ATRIAL RATE: 81 BPM
EKG ATRIAL RATE: 82 BPM
EKG P AXIS: 20 DEGREES
EKG P AXIS: 23 DEGREES
EKG P AXIS: 29 DEGREES
EKG P AXIS: 56 DEGREES
EKG P-R INTERVAL: 192 MS
EKG P-R INTERVAL: 216 MS
EKG P-R INTERVAL: 224 MS
EKG P-R INTERVAL: 226 MS
EKG P-R INTERVAL: 232 MS
EKG Q-T INTERVAL: 360 MS
EKG Q-T INTERVAL: 416 MS
EKG Q-T INTERVAL: 420 MS
EKG Q-T INTERVAL: 430 MS
EKG Q-T INTERVAL: 434 MS
EKG QRS DURATION: 132 MS
EKG QRS DURATION: 148 MS
EKG QRS DURATION: 152 MS
EKG QRS DURATION: 154 MS
EKG QRS DURATION: 158 MS
EKG QTC CALCULATION (BAZETT): 484 MS
EKG QTC CALCULATION (BAZETT): 486 MS
EKG QTC CALCULATION (BAZETT): 487 MS
EKG QTC CALCULATION (BAZETT): 497 MS
EKG QTC CALCULATION (BAZETT): 499 MS
EKG R AXIS: -20 DEGREES
EKG R AXIS: -8 DEGREES
EKG R AXIS: -9 DEGREES
EKG R AXIS: 0 DEGREES
EKG R AXIS: 4 DEGREES
EKG T AXIS: 156 DEGREES
EKG T AXIS: 157 DEGREES
EKG T AXIS: 161 DEGREES
EKG T AXIS: 164 DEGREES
EKG T AXIS: 164 DEGREES
EKG VENTRICULAR RATE: 110 BPM
EKG VENTRICULAR RATE: 79 BPM
EKG VENTRICULAR RATE: 80 BPM
EKG VENTRICULAR RATE: 81 BPM
EKG VENTRICULAR RATE: 82 BPM
EOSINOPHIL # BLD: 0 K/UL (ref 0–0.4)
EOSINOPHILS RELATIVE PERCENT: 0 % (ref 1–4)
ERYTHROCYTE [DISTWIDTH] IN BLOOD BY AUTOMATED COUNT: 20.3 % (ref 11.8–14.4)
FREE KAPPA/LAMBDA RATIO: 2.06 (ref 0.26–1.65)
GFR SERPL CREATININE-BSD FRML MDRD: 40 ML/MIN/1.73M2
GLUCOSE BLD-MCNC: 143 MG/DL (ref 75–110)
GLUCOSE BLD-MCNC: 201 MG/DL (ref 75–110)
GLUCOSE BLD-MCNC: 207 MG/DL (ref 75–110)
GLUCOSE BLD-MCNC: 226 MG/DL (ref 75–110)
GLUCOSE BLD-MCNC: 299 MG/DL (ref 75–110)
GLUCOSE SERPL-MCNC: 202 MG/DL (ref 74–99)
HCT VFR BLD AUTO: 26.7 % (ref 40.7–50.3)
HCT VFR BLD AUTO: 27.5 % (ref 40.7–50.3)
HGB BLD-MCNC: 8.2 G/DL (ref 13–17)
HGB BLD-MCNC: 8.4 G/DL (ref 13–17)
IMM GRANULOCYTES # BLD AUTO: 0.13 K/UL (ref 0–0.3)
IMM GRANULOCYTES NFR BLD: 1 %
KAPPA LC FREE SER-MCNC: 49.3 MG/L (ref 3.7–19.4)
LAMBDA LC FREE SERPL-MCNC: 23.9 MG/L (ref 5.7–26.3)
LYMPHOCYTES NFR BLD: 2.19 K/UL (ref 1–4.8)
LYMPHOCYTES RELATIVE PERCENT: 17 % (ref 24–44)
MAGNESIUM SERPL-MCNC: 2.6 MG/DL (ref 1.6–2.4)
MCH RBC QN AUTO: 29 PG (ref 25.2–33.5)
MCHC RBC AUTO-ENTMCNC: 29.8 G/DL (ref 28.4–34.8)
MCV RBC AUTO: 97.2 FL (ref 82.6–102.9)
MICROORGANISM SPEC CULT: NORMAL
MICROORGANISM SPEC CULT: NORMAL
MONOCYTES NFR BLD: 0.9 K/UL (ref 0.1–0.8)
MONOCYTES NFR BLD: 7 % (ref 1–7)
MORPHOLOGY: ABNORMAL
MORPHOLOGY: ABNORMAL
NEUTROPHILS NFR BLD: 75 % (ref 36–66)
NEUTS SEG NFR BLD: 9.68 K/UL (ref 1.8–7.7)
NRBC BLD-RTO: 0.7 PER 100 WBC
NUCLEATED RED BLOOD CELLS: 1 PER 100 WBC
PLATELET # BLD AUTO: 260 K/UL (ref 138–453)
PMV BLD AUTO: 10.2 FL (ref 8.1–13.5)
POTASSIUM SERPL-SCNC: 5.2 MMOL/L (ref 3.7–5.3)
RBC # BLD AUTO: 2.83 M/UL (ref 4.21–5.77)
SERVICE CMNT-IMP: NORMAL
SERVICE CMNT-IMP: NORMAL
SODIUM SERPL-SCNC: 141 MMOL/L (ref 136–145)
SPECIMEN DESCRIPTION: NORMAL
SPECIMEN DESCRIPTION: NORMAL
TROPONIN I SERPL HS-MCNC: 672 NG/L (ref 0–22)
TROPONIN I SERPL HS-MCNC: 678 NG/L (ref 0–22)
TROPONIN I SERPL HS-MCNC: 748 NG/L (ref 0–22)
TROPONIN I SERPL HS-MCNC: 779 NG/L (ref 0–22)
WBC OTHER # BLD: 12.9 K/UL (ref 3.5–11.3)

## 2024-03-06 PROCEDURE — 85018 HEMOGLOBIN: CPT

## 2024-03-06 PROCEDURE — 83735 ASSAY OF MAGNESIUM: CPT

## 2024-03-06 PROCEDURE — 85520 HEPARIN ASSAY: CPT

## 2024-03-06 PROCEDURE — 6370000000 HC RX 637 (ALT 250 FOR IP)

## 2024-03-06 PROCEDURE — 6370000000 HC RX 637 (ALT 250 FOR IP): Performed by: INTERNAL MEDICINE

## 2024-03-06 PROCEDURE — 84484 ASSAY OF TROPONIN QUANT: CPT

## 2024-03-06 PROCEDURE — 99291 CRITICAL CARE FIRST HOUR: CPT | Performed by: INTERNAL MEDICINE

## 2024-03-06 PROCEDURE — 71045 X-RAY EXAM CHEST 1 VIEW: CPT

## 2024-03-06 PROCEDURE — A4216 STERILE WATER/SALINE, 10 ML: HCPCS

## 2024-03-06 PROCEDURE — 80048 BASIC METABOLIC PNL TOTAL CA: CPT

## 2024-03-06 PROCEDURE — 99232 SBSQ HOSP IP/OBS MODERATE 35: CPT | Performed by: INTERNAL MEDICINE

## 2024-03-06 PROCEDURE — 85025 COMPLETE CBC W/AUTO DIFF WBC: CPT

## 2024-03-06 PROCEDURE — 93005 ELECTROCARDIOGRAM TRACING: CPT | Performed by: INTERNAL MEDICINE

## 2024-03-06 PROCEDURE — 2000000000 HC ICU R&B

## 2024-03-06 PROCEDURE — C9113 INJ PANTOPRAZOLE SODIUM, VIA: HCPCS

## 2024-03-06 PROCEDURE — 93005 ELECTROCARDIOGRAM TRACING: CPT

## 2024-03-06 PROCEDURE — 6360000002 HC RX W HCPCS

## 2024-03-06 PROCEDURE — 99233 SBSQ HOSP IP/OBS HIGH 50: CPT | Performed by: INTERNAL MEDICINE

## 2024-03-06 PROCEDURE — 82947 ASSAY GLUCOSE BLOOD QUANT: CPT

## 2024-03-06 PROCEDURE — 36415 COLL VENOUS BLD VENIPUNCTURE: CPT

## 2024-03-06 PROCEDURE — 85014 HEMATOCRIT: CPT

## 2024-03-06 PROCEDURE — 94660 CPAP INITIATION&MGMT: CPT

## 2024-03-06 PROCEDURE — 94761 N-INVAS EAR/PLS OXIMETRY MLT: CPT

## 2024-03-06 PROCEDURE — 6370000000 HC RX 637 (ALT 250 FOR IP): Performed by: STUDENT IN AN ORGANIZED HEALTH CARE EDUCATION/TRAINING PROGRAM

## 2024-03-06 PROCEDURE — 2580000003 HC RX 258

## 2024-03-06 PROCEDURE — 2700000000 HC OXYGEN THERAPY PER DAY

## 2024-03-06 RX ORDER — INSULIN LISPRO 100 [IU]/ML
0-8 INJECTION, SOLUTION INTRAVENOUS; SUBCUTANEOUS EVERY 4 HOURS
Status: DISCONTINUED | OUTPATIENT
Start: 2024-03-06 | End: 2024-03-06

## 2024-03-06 RX ORDER — INSULIN LISPRO 100 [IU]/ML
0-4 INJECTION, SOLUTION INTRAVENOUS; SUBCUTANEOUS NIGHTLY
Status: DISCONTINUED | OUTPATIENT
Start: 2024-03-06 | End: 2024-03-06

## 2024-03-06 RX ORDER — LIDOCAINE 4 G/G
1 PATCH TOPICAL DAILY
Status: DISCONTINUED | OUTPATIENT
Start: 2024-03-06 | End: 2024-03-15 | Stop reason: HOSPADM

## 2024-03-06 RX ORDER — INSULIN LISPRO 100 [IU]/ML
0-4 INJECTION, SOLUTION INTRAVENOUS; SUBCUTANEOUS NIGHTLY
Status: DISCONTINUED | OUTPATIENT
Start: 2024-03-06 | End: 2024-03-15 | Stop reason: HOSPADM

## 2024-03-06 RX ORDER — INSULIN LISPRO 100 [IU]/ML
0-8 INJECTION, SOLUTION INTRAVENOUS; SUBCUTANEOUS
Status: DISCONTINUED | OUTPATIENT
Start: 2024-03-06 | End: 2024-03-06

## 2024-03-06 RX ORDER — INSULIN LISPRO 100 [IU]/ML
0-16 INJECTION, SOLUTION INTRAVENOUS; SUBCUTANEOUS
Status: DISCONTINUED | OUTPATIENT
Start: 2024-03-06 | End: 2024-03-15 | Stop reason: HOSPADM

## 2024-03-06 RX ADMIN — MIDODRINE HYDROCHLORIDE 10 MG: 5 TABLET ORAL at 11:40

## 2024-03-06 RX ADMIN — HEPARIN SODIUM 10 UNITS/KG/HR: 10000 INJECTION, SOLUTION INTRAVENOUS at 17:40

## 2024-03-06 RX ADMIN — INSULIN LISPRO 4 UNITS: 100 INJECTION, SOLUTION INTRAVENOUS; SUBCUTANEOUS at 17:09

## 2024-03-06 RX ADMIN — INSULIN GLARGINE 15 UNITS: 100 INJECTION, SOLUTION SUBCUTANEOUS at 22:56

## 2024-03-06 RX ADMIN — INSULIN LISPRO 2 UNITS: 100 INJECTION, SOLUTION INTRAVENOUS; SUBCUTANEOUS at 11:48

## 2024-03-06 RX ADMIN — INSULIN LISPRO 2 UNITS: 100 INJECTION, SOLUTION INTRAVENOUS; SUBCUTANEOUS at 09:09

## 2024-03-06 RX ADMIN — TORSEMIDE 20 MG: 20 TABLET ORAL at 08:33

## 2024-03-06 RX ADMIN — ATORVASTATIN CALCIUM 80 MG: 80 TABLET, FILM COATED ORAL at 20:02

## 2024-03-06 RX ADMIN — PANTOPRAZOLE SODIUM 40 MG: 40 INJECTION, POWDER, FOR SOLUTION INTRAVENOUS at 08:33

## 2024-03-06 RX ADMIN — MIDODRINE HYDROCHLORIDE 10 MG: 5 TABLET ORAL at 17:10

## 2024-03-06 RX ADMIN — ASPIRIN 81 MG 81 MG: 81 TABLET ORAL at 08:33

## 2024-03-06 RX ADMIN — SODIUM CHLORIDE, PRESERVATIVE FREE 10 ML: 5 INJECTION INTRAVENOUS at 20:03

## 2024-03-06 RX ADMIN — PANTOPRAZOLE SODIUM 40 MG: 40 INJECTION, POWDER, FOR SOLUTION INTRAVENOUS at 20:02

## 2024-03-06 RX ADMIN — MIDODRINE HYDROCHLORIDE 10 MG: 5 TABLET ORAL at 08:33

## 2024-03-06 RX ADMIN — SODIUM CHLORIDE, PRESERVATIVE FREE 10 ML: 5 INJECTION INTRAVENOUS at 08:34

## 2024-03-06 ASSESSMENT — PAIN DESCRIPTION - FREQUENCY: FREQUENCY: CONTINUOUS

## 2024-03-06 ASSESSMENT — PAIN DESCRIPTION - ORIENTATION
ORIENTATION: RIGHT
ORIENTATION: MID

## 2024-03-06 ASSESSMENT — PAIN DESCRIPTION - LOCATION
LOCATION: CHEST
LOCATION: CHEST

## 2024-03-06 ASSESSMENT — PAIN DESCRIPTION - DESCRIPTORS
DESCRIPTORS: ACHING
DESCRIPTORS: DISCOMFORT;OTHER (COMMENT)

## 2024-03-06 ASSESSMENT — PAIN DESCRIPTION - ONSET: ONSET: ON-GOING

## 2024-03-06 ASSESSMENT — PAIN SCALES - GENERAL
PAINLEVEL_OUTOF10: 5
PAINLEVEL_OUTOF10: 7
PAINLEVEL_OUTOF10: 5

## 2024-03-06 ASSESSMENT — PAIN DESCRIPTION - PAIN TYPE: TYPE: ACUTE PAIN

## 2024-03-06 NOTE — PROGRESS NOTES
[facility]   Department of Internal Medicine & Critical Care - Staff Internal Medicine Teaching Service     Critical Care - Daily Progress Note      Date and time: 3/6/2024 8:05 AM  Patient's name:  Kb Wilson  Medical Record Number: [id number]  Patient's account/billing number: [id number]  Patient's YOB: 1945  Age: 78 y.o.  Date of Admission: 2/29/2024  3:40 PM  Length of stay during current admission: 6  Primary Care Physician: Viridiana Taveras  ICU Attending Physician: Judson Cortés MD    Code Status: Full Code  Reason for ICU admission: cardiac arrest   Subjective:     Pt was seen and examined at bedside. Has been off levo since 8pm on 3/5. Remains tachycardic in low 100s.  Labs reviewed.    Extubated on 3/1/24  Improving tolerance of bipap.  Requiring Bipap more often- CXR showing worsening vascular congestion and new left pleural effusion.   Restarted on Torsemide 20mg daily 3/5/24.     Current labs for 3/6 still in process at time of evaluation.     Off insulin gtt overnight. Glucose 143. Had received a dose of solumedrol on 3/4, likely cause of worsening hyperglycemia.    WBC slightly increased 12.9 from 9.7  Hgb 8.2, stable  Plt 260    Trop 223 >>> 483 > 500 > 672, on heparin gtt  Planning for staged PCI- next stage on 3/6 or 3/7    UOP 0.45cc/24 hr;    Cardiology recs: plan for next stage PCI on 3/6 or 3/7  GI recs: lower GI bleed resolved, likely diverticular, may advance diet as tolerated, ok for AC, f/u OP with GI      OVERNIGHT EVENTS:         Insulin gtt discontinued    URINE OUTPUT:   [x] Good  [] Low  [] Anuric      CONSULT SERVICES: CARDIOLOGY    BRIEF SUMMARY:    Kb Wilson is a 78 y.o. with a history of:  CAD status post CABG on Plavix  CHF with ejection fraction 20% has AICD  Paroxysmal A-fib on Eliquis  Diabetes mellitus  Hypertension  Stage III kidney disease     Patient presented to [place] emergency department on   --   --  29.8   RDW 19.6*  --  19.3*  --   --   --  20.3*   MPV 10.0  --  10.3  --   --   --  10.2    < > = values in this interval not displayed.          PT/INR:    Lab Results   Component Value Date/Time    PROTIME 13.9 03/03/2024 11:56 AM    INR 1.1 03/03/2024 11:56 AM     PTT:    Lab Results   Component Value Date/Time    APTT 156.7 03/05/2024 09:13 AM       Basal Metabolic Profile:   Recent Labs     03/03/24  1801 03/04/24  0438 03/05/24  0547    136 138   K 4.7 5.0 5.0   BUN 33* 38* 39*   CREATININE 1.5* 1.6* 1.6*    104 105   CO2 23 23 26        LFTS  Recent Labs     03/05/24  0547   ALKPHOS 58   ALT 29   AST 13   BILITOT 0.6   BILIDIR 0.2   LABALBU 2.6*         MEDICATIONS:  Scheduled Meds:   insulin glargine  15 Units SubCUTAneous Nightly    atorvastatin  80 mg Oral Nightly    torsemide  20 mg Oral Daily    aspirin  81 mg Oral Daily    midodrine  10 mg Oral TID WC    sodium chloride flush  5-40 mL IntraVENous 2 times per day    pantoprazole (PROTONIX) 40 mg in sodium chloride (PF) 0.9 % 10 mL injection  40 mg IntraVENous BID     Continuous Infusions:   dextrose      heparin (PORCINE) Infusion 10 Units/kg/hr (03/06/24 0705)    norepinephrine Stopped (03/05/24 1945)    sodium chloride 10 mL/hr at 03/06/24 0705    dextrose       PRN Meds:   glucose, 4 tablet, PRN  dextrose bolus, 125 mL, PRN   Or  dextrose bolus, 250 mL, PRN  glucagon (rDNA), 1 mg, PRN  dextrose, , Continuous PRN  heparin (porcine), 4,000 Units, PRN  heparin (porcine), 2,000 Units, PRN  benzonatate, 100 mg, TID PRN  sodium chloride flush, 5-40 mL, PRN  sodium chloride, , PRN  potassium chloride, 20 mEq, PRN   Or  potassium chloride, 10 mEq, PRN  magnesium sulfate, 2,000 mg, PRN  ondansetron, 4 mg, Q8H PRN   Or  ondansetron, 4 mg, Q6H PRN  polyethylene glycol, 17 g, Daily PRN  acetaminophen, 650 mg, Q6H PRN   Or  acetaminophen, 650 mg, Q6H PRN  glucose, 4 tablet, PRN  dextrose bolus, 125 mL, PRN   Or  dextrose bolus, 250 mL,

## 2024-03-06 NOTE — PROGRESS NOTES
Nephrology Progress Note      SUBJECTIVE       The patient was seen and examined. No acute issues overnite. Stable hemodynamics .  Serum creatinine is stable at 1.7.  Potassium is 5.2.  Breathing is reasonable.  He is on torsemide.  Torsemide dose is his home dose.  Angiotensin receptor blocker remains on hold.  Elena catheter is in place.  Cardiology is planning a staged PCI likely 3/6 or 3/7/2024.    No chest pain or nausea or vomiting.  Mild edema.  Does use BiPAP at times.    He is back on his home dose of torsemide.  Awaiting potential cardiac catheterization either later today or tomorrow.         OBJECTIVE      CURRENT TEMPERATURE:  Temp: 97.6 °F (36.4 °C)  MAXIMUM TEMPERATURE OVER 24HRS:  Temp (24hrs), Av.1 °F (37.3 °C), Min:97.6 °F (36.4 °C), Max:99.3 °F (37.4 °C)    CURRENT RESPIRATORY RATE:  Respirations: 21  CURRENT PULSE:  Pulse: 78  CURRENT BLOOD PRESSURE:  BP: 105/70  24HR BLOOD PRESSURE RANGE:  Systolic (24hrs), Av , Min:81 , Max:129   ; Diastolic (24hrs), Av, Min:48, Max:90    24HR INTAKE/OUTPUT:    Intake/Output Summary (Last 24 hours) at 3/6/2024 1329  Last data filed at 3/6/2024 0931  Gross per 24 hour   Intake 515.23 ml   Output 1225 ml   Net -709.77 ml     WEIGHT :Patient Vitals for the past 96 hrs (Last 3 readings):   Weight   24 0535 132.4 kg (291 lb 14.2 oz)   24 0436 (!) 138.3 kg (304 lb 14.3 oz)   24 1852 127.5 kg (281 lb)     PHYSICAL EXAM      GENERAL APPEARANCE: Awake, alert, in no acute distress  SKIN: warm and dry, no rash or erythema  EYES: conjunctivae pale and sclera anicteric  ENT: no thrush, moist mucus membranes  NECK:   No JVD.  Trachea is midline, no accessory muscle use  PULMONARY: lungs are clear to auscultation. No wheezing, rales or rhonchi  CARDIOVASCULAR: Regular rate and rhythm with positive S1 and S2 and no rubs  ABDOMEN: soft nontender, bowel sounds present,  no ascites.       EXTREMITIES: no cyanosis, clubbing or edema     CURRENT  2.73*  --   --   --  2.83*   HGB 8.9*   < > 7.8*   < > 7.7* 8.4* 8.2*   HCT 30.9*   < > 26.2*   < > 26.8* 26.7* 27.5*   .0  --  96.0  --   --   --  97.2   MCH 28.8  --  28.6  --   --   --  29.0   MCHC 28.8  --  29.8  --   --   --  29.8   RDW 19.6*  --  19.3*  --   --   --  20.3*     --  227  --   --   --  260   MPV 10.0  --  10.3  --   --   --  10.2    < > = values in this interval not displayed.      BMP:   Recent Labs     03/04/24  0438 03/05/24  0547 03/06/24  0646    138 141   K 5.0 5.0 5.2    105 108*   CO2 23 26 26   BUN 38* 39* 48*   CREATININE 1.6* 1.6* 1.7*   GLUCOSE 248* 194* 202*   CALCIUM 8.3* 8.0* 7.9*      BNP:No results found for: \"BNP\"  PHOSPHORUS:    Recent Labs     03/05/24  0547   PHOS 2.5     MAGNESIUM:   Recent Labs     03/06/24  0646   MG 2.6*     ALBUMIN:   Recent Labs     03/05/24  0547   LABALBU 2.6*     IRON:    Lab Results   Component Value Date/Time    IRON 47 02/28/2024 03:45 AM     IRON SATURATION:  No components found for: \"LABIRON\"  TIBC:    Lab Results   Component Value Date/Time    TIBC 183 02/28/2024 03:45 AM     FERRITIN:  No results found for: \"FERRITIN\"  ANCA: No results found for: \"ANCA\"    SPEP:   Lab Results   Component Value Date/Time    PROT 5.1 03/05/2024 05:47 AM     UPEP: No results found for: \"TPU\"   HEPBSAG:No results found for: \"HEPBSAG\"  HEPCAB:No results found for: \"HEPCAB\"  C3:   Lab Results   Component Value Date    C3 112 03/05/2024     C4:   Lab Results   Component Value Date    C4 20 03/05/2024     MPO ANCA: No results found for: \"MPO\" .  PR3 ANCA:  No results found for: \"PR3\"  URINE SODIUM:  No results found for: \"ALFA\"   URINE CREATININE:    Lab Results   Component Value Date/Time    LABCREA 109.7 03/04/2024 09:39 PM     URINE EOSINOPHILS: No results found for: \"UREO\"  URINE PROTEIN:  No results found for: \"TPU\"  URINALYSIS:  U/A:   Lab Results   Component Value Date/Time    NITRU NEGATIVE 03/04/2024 09:39 PM    COLORU Yellow 03/04/2024  09:39 PM    PHUR 5.5 03/04/2024 09:39 PM    WBCUA 10 TO 20 03/04/2024 09:39 PM    RBCUA 50  03/04/2024 09:39 PM    BACTERIA None 03/04/2024 09:39 PM    SPECGRAV 1.035 03/04/2024 09:39 PM    LEUKOCYTESUR NEGATIVE 03/04/2024 09:39 PM    UROBILINOGEN Normal 03/04/2024 09:39 PM    BILIRUBINUR NEGATIVE 03/04/2024 09:39 PM    GLUCOSEU 3+ 03/04/2024 09:39 PM    KETUA NEGATIVE 03/04/2024 09:39 PM     ANTIGBM:No results found for: \"GBMABIGG\"      RADIOLOGY      Reviewed as available.      ASSESSMENT      1. Chronic kidney disease stage 3B, relatively stable  2. Mutivessel CAD with stage procedure for PCI to left main coronary artery and left circumflex artery planned soon  3.  No significant volume overload but does have a bit of pulmonary vascular congestion likely and is back on his home torsemide dose  4.  Type 2 diabetes mellitus  5.  Primary hypertension     PLAN      1. Torsemide 20 mg oral daily to continue, home dose   2. OK to perform staged PCI any time from a nephrology stand point  3. Follow up labs ordered.   4.  Elena catheter is now out  5. Keep losartan on hold for now with upcoming staged PCI procedure      Please do not hesitate to call with questions.    This note is created with the assistance of a speech-recognition program. While intending to generate a document that actually reflects the content of the visit, no guarantees can be provided that every mistake has been identified and corrected by editing    Electronically signed by Kaleb Adams MD on 3/6/2024 at 1:29 PM

## 2024-03-06 NOTE — CARE COORDINATION
Transitional planning. Cardiac Cath today., 3L NC/ BiPAP, plan is home w/ [facility]- current/verified, has walker and Home O2, Will need travel Home O2 tank for transport home. Needs PT/OT order.

## 2024-03-06 NOTE — PLAN OF CARE
Problem: Chronic Conditions and Co-morbidities  Goal: Patient's chronic conditions and co-morbidity symptoms are monitored and maintained or improved  3/6/2024 0934 by Brian Lyn RN  Outcome: Progressing  3/6/2024 0455 by Jazmine Lowe RN  Outcome: Progressing     Problem: Safety - Adult  Goal: Free from fall injury  3/6/2024 0934 by Brian Lyn RN  Outcome: Progressing  3/6/2024 0455 by Jazmine Lowe RN  Outcome: Progressing     Problem: Discharge Planning  Goal: Discharge to home or other facility with appropriate resources  3/6/2024 0934 by Brian Lyn RN  Outcome: Progressing  3/6/2024 0455 by Jazmine Lowe RN  Outcome: Progressing     Problem: Respiratory - Adult  Goal: Achieves optimal ventilation and oxygenation  3/6/2024 0934 by Brian Lyn RN  Outcome: Progressing  3/6/2024 0455 by Jazmine Lowe RN  Outcome: Progressing     Problem: Pain  Goal: Verbalizes/displays adequate comfort level or baseline comfort level  3/6/2024 0934 by Brian Lyn RN  Outcome: Progressing  3/6/2024 0455 by Jazmine Lowe RN  Outcome: Progressing     Problem: Skin/Tissue Integrity  Goal: Absence of new skin breakdown  3/6/2024 0934 by Brian Lyn RN  Outcome: Progressing  3/6/2024 0455 by Jazmine Lowe RN  Outcome: Progressing     Problem: ABCDS Injury Assessment  Goal: Absence of physical injury  3/6/2024 0934 by Brian Lyn RN  Outcome: Progressing  Flowsheets (Taken 3/6/2024 0800)  Absence of Physical Injury: Implement safety measures based on patient assessment  3/6/2024 0455 by Jazmine Lowe RN  Outcome: Progressing

## 2024-03-06 NOTE — PROGRESS NOTES
premix, 2,000 mg, IntraVENous, PRN  ondansetron (ZOFRAN-ODT) disintegrating tablet 4 mg, 4 mg, Oral, Q8H PRN **OR** ondansetron (ZOFRAN) injection 4 mg, 4 mg, IntraVENous, Q6H PRN  polyethylene glycol (GLYCOLAX) packet 17 g, 17 g, Oral, Daily PRN  acetaminophen (TYLENOL) tablet 650 mg, 650 mg, Oral, Q6H PRN **OR** acetaminophen (TYLENOL) suppository 650 mg, 650 mg, Rectal, Q6H PRN  pantoprazole (PROTONIX) 40 mg in sodium chloride (PF) 0.9 % 10 mL injection, 40 mg, IntraVENous, BID  glucose chewable tablet 16 g, 4 tablet, Oral, PRN  dextrose bolus 10% 125 mL, 125 mL, IntraVENous, PRN **OR** dextrose bolus 10% 250 mL, 250 mL, IntraVENous, PRN  glucagon injection 1 mg, 1 mg, SubCUTAneous, PRN  dextrose 10 % infusion, , IntraVENous, Continuous PRN    Allergies:  Barium-containing compounds, Iodinated contrast media, and Hydromorphone    Social History:   reports that he has never smoked. He has never used smokeless tobacco. He reports current alcohol use. He reports that he does not use drugs.     Family History: family history is not on file. No h/o sudden cardiac death.No for premature CAD    REVIEW OF SYSTEMS:    Constitutional: there has been no unanticipated weight loss. There's been No change in energy level, No change in activity level.     Eyes: No visual changes or diplopia. No scleral icterus.  ENT: No Headaches  Cardiovascular: as above  Respiratory: No previous pulmonary problems, No cough  Gastrointestinal: No abdominal pain.  No change in bowel or bladder habits.  Genitourinary: No dysuria, trouble voiding, or hematuria.  Musculoskeletal:  No gait disturbance, No weakness or joint complaints.  Neurological: No headache, diplopia, change in muscle strength, numbness or tingling.  Hematologic/Lymphatic: No abnormal bruising or bleeding, blood clots or swollen lymph nodes.      PHYSICAL EXAM:      BP (!) 101/90   Pulse (!) 111   Temp (!) 96.7 °F (35.9 °C) (Axillary)   Resp 26   Ht 1.905 m (6' 3\")   Wt  not displayed.       BMP:   Recent Labs     03/04/24  0438 03/05/24  0547    138   K 5.0 5.0   CO2 23 26   BUN 38* 39*   CREATININE 1.6* 1.6*   LABGLOM 44* 44*   GLUCOSE 248* 194*       BNP: No results for input(s): \"BNP\" in the last 72 hours.  PT/INR:   Recent Labs     03/03/24  1156   PROTIME 13.9   INR 1.1       APTT:  Recent Labs     03/05/24  0132 03/05/24  0913   APTT 34.2 156.7*       CARDIAC ENZYMES:No results for input(s): \"CKTOTAL\", \"CKMB\", \"CKMBINDEX\", \"TROPONINI\" in the last 72 hours.  FASTING LIPID PANEL:  Lab Results   Component Value Date/Time    HDL 47 02/23/2024 05:00 AM    TRIG 136 02/23/2024 05:00 AM     LIVER PROFILE:  Recent Labs     03/05/24  0547   AST 13   ALT 29   LABALBU 2.6*             Patient Active Problem List   Diagnosis    Fall    Hypertension    Diabetes mellitus ([facility])    CHF (congestive heart failure) ([facility])    CAD, multiple vessel    Elevated troponin    Ischemic cardiomyopathy    AICD (automatic cardioverter/defibrillator) present    GI bleed    Rectal bleeding    Acute anemia    Lower abdominal pain    Diverticulitis    Hx of CABG    PAD (peripheral artery disease) ([facility])    Diabetic neuropathy ([facility])    PAF (paroxysmal atrial fibrillation) ([facility])    Stage 3 chronic kidney disease ([facility])    Cardiac arrest ([facility])    NSTEMI (non-ST elevated myocardial infarction) ([facility])    Diverticular hemorrhage    Acute blood loss anemia    ACS (acute coronary syndrome) ([facility])       IMPRESSION:    Cardiac arrest, PEA/asystole, during colonoscopy  AICD interrogation ruled out arrhythmias.   NSTEMI, likely type II in the setting of cardiac arrest, and anemia, but cannot rule out type I.  cardiac cath 3/4/24 which showed Left main and MVCAD. Only LIMA to LAD graft is patent. Occluded SVG to diagonal, OM2 and RPDA.GI bleed  Shock, cardiogenic versus hypovolemic, (resolved - off pressors)  CAD s/p CABG (2008) and previous PCI  Ischemic cardiomyopathy (EF 25% s/p AICD in place),  CKD stage IIIA  Acute  hypoxic respiratory failure (improving)  Anemia (stable)    RECOMMENDATIONS:  Planned for staged PCI to Left main and Lcx 3/6, keep NPO  Nephrology on board, kidney functions at baseline. Cleared for cardiac cath from nephrology standpoint at any time.   GI note reviewed, per GI okay for anticoagulation and antiplatelets.   Continue rest of management as per critical care team.  Further recommendations to follow after rounding with the attending.     Kayden Mejia MD   Internal Medicine Resident, PGY-1  Cardiology service  [facility]; [place], [place]  3/6/2024, 6:49 AM      Attending Physician Statement  I have discussed the care of the patient, including pertinent history and exam findings, with the resident. I have seen and examined the patient and the key elements of all parts of the encounter have been performed by me. I agree with the assessment, plan and orders as documented by the resident.  As patient is cleared by nephrology and GI  Plan to do stage PCI today  Anoop Bloom MD

## 2024-03-07 ENCOUNTER — APPOINTMENT (OUTPATIENT)
Dept: GENERAL RADIOLOGY | Age: 79
DRG: 981 | End: 2024-03-07
Attending: INTERNAL MEDICINE
Payer: COMMERCIAL

## 2024-03-07 PROBLEM — I25.10 CAD IN NATIVE ARTERY: Status: ACTIVE | Noted: 2024-02-29

## 2024-03-07 PROBLEM — Z95.5 S/P PRIMARY ANGIOPLASTY WITH CORONARY STENT: Status: ACTIVE | Noted: 2024-03-07

## 2024-03-07 PROBLEM — I25.10 CAD, MULTIPLE VESSEL: Status: ACTIVE | Noted: 2024-03-07

## 2024-03-07 LAB
ANA SER QL IA: NEGATIVE
ANCA MYELOPEROXIDASE: <0.3 AU/ML (ref 0–3.5)
ANCA PROTEINASE 3: <0.7 AU/ML (ref 0–2)
ANION GAP SERPL CALCULATED.3IONS-SCNC: 10 MMOL/L (ref 9–16)
ANTI-XA UNFRAC HEPARIN: 0.45 IU/L
BASOPHILS # BLD: 0 K/UL (ref 0–0.2)
BASOPHILS NFR BLD: 0 % (ref 0–2)
BUN SERPL-MCNC: 49 MG/DL (ref 8–23)
CALCIUM SERPL-MCNC: 7.8 MG/DL (ref 8.6–10.4)
CHLORIDE SERPL-SCNC: 107 MMOL/L (ref 98–107)
CO2 SERPL-SCNC: 25 MMOL/L (ref 20–31)
CREAT SERPL-MCNC: 1.6 MG/DL (ref 0.7–1.2)
DSDNA IGG SER QL IA: <0.5 IU/ML
ECHO BSA: 2.6 M2
ECHO BSA: 2.6 M2
EOSINOPHIL # BLD: 0.32 K/UL (ref 0–0.4)
EOSINOPHILS RELATIVE PERCENT: 3 % (ref 1–4)
ERYTHROCYTE [DISTWIDTH] IN BLOOD BY AUTOMATED COUNT: 20.3 % (ref 11.8–14.4)
GFR SERPL CREATININE-BSD FRML MDRD: 44 ML/MIN/1.73M2
GLUCOSE BLD-MCNC: 229 MG/DL (ref 75–110)
GLUCOSE BLD-MCNC: 234 MG/DL (ref 75–110)
GLUCOSE BLD-MCNC: 335 MG/DL (ref 75–110)
GLUCOSE BLD-MCNC: 383 MG/DL (ref 75–110)
GLUCOSE SERPL-MCNC: 233 MG/DL (ref 74–99)
HCT VFR BLD AUTO: 27.9 % (ref 40.7–50.3)
HGB BLD-MCNC: 8.4 G/DL (ref 13–17)
IMM GRANULOCYTES # BLD AUTO: 0.11 K/UL (ref 0–0.3)
IMM GRANULOCYTES NFR BLD: 1 %
INTERPRETATION SERPL IFE-IMP: NORMAL
LYMPHOCYTES NFR BLD: 1.38 K/UL (ref 1–4.8)
LYMPHOCYTES RELATIVE PERCENT: 13 % (ref 24–44)
MCH RBC QN AUTO: 29 PG (ref 25.2–33.5)
MCHC RBC AUTO-ENTMCNC: 30.1 G/DL (ref 28.4–34.8)
MCV RBC AUTO: 96.2 FL (ref 82.6–102.9)
MONOCYTES NFR BLD: 0.42 K/UL (ref 0.1–0.8)
MONOCYTES NFR BLD: 4 % (ref 1–7)
MORPHOLOGY: ABNORMAL
NEUTROPHILS NFR BLD: 79 % (ref 36–66)
NEUTS SEG NFR BLD: 8.37 K/UL (ref 1.8–7.7)
NRBC BLD-RTO: 1.5 PER 100 WBC
NUCLEAR IGG SER IA-RTO: <0.1 U/ML
NUCLEATED RED BLOOD CELLS: 1 PER 100 WBC
PATH REV: NORMAL
PLATELET # BLD AUTO: 279 K/UL (ref 138–453)
PMV BLD AUTO: 10.4 FL (ref 8.1–13.5)
POTASSIUM SERPL-SCNC: 4.8 MMOL/L (ref 3.7–5.3)
RBC # BLD AUTO: 2.9 M/UL (ref 4.21–5.77)
SODIUM SERPL-SCNC: 142 MMOL/L (ref 136–145)
TROPONIN I SERPL HS-MCNC: 1028 NG/L (ref 0–22)
TROPONIN I SERPL HS-MCNC: 810 NG/L (ref 0–22)
TROPONIN I SERPL HS-MCNC: 969 NG/L (ref 0–22)
WBC OTHER # BLD: 10.6 K/UL (ref 3.5–11.3)

## 2024-03-07 PROCEDURE — C1874 STENT, COATED/COV W/DEL SYS: HCPCS | Performed by: INTERNAL MEDICINE

## 2024-03-07 PROCEDURE — 2709999900 HC NON-CHARGEABLE SUPPLY: Performed by: INTERNAL MEDICINE

## 2024-03-07 PROCEDURE — 027135Z DILATION OF CORONARY ARTERY, TWO ARTERIES WITH TWO DRUG-ELUTING INTRALUMINAL DEVICES, PERCUTANEOUS APPROACH: ICD-10-PCS | Performed by: INTERNAL MEDICINE

## 2024-03-07 PROCEDURE — 92920 PRQ TRLUML C ANGIOP 1ART&/BR: CPT | Performed by: INTERNAL MEDICINE

## 2024-03-07 PROCEDURE — 84484 ASSAY OF TROPONIN QUANT: CPT

## 2024-03-07 PROCEDURE — 6370000000 HC RX 637 (ALT 250 FOR IP)

## 2024-03-07 PROCEDURE — 2580000003 HC RX 258: Performed by: INTERNAL MEDICINE

## 2024-03-07 PROCEDURE — C1761 HC CATH TRANSLUM INTRAVASCULAR LITHOTRIPSY CORONARY: HCPCS | Performed by: INTERNAL MEDICINE

## 2024-03-07 PROCEDURE — 6360000002 HC RX W HCPCS

## 2024-03-07 PROCEDURE — C9113 INJ PANTOPRAZOLE SODIUM, VIA: HCPCS

## 2024-03-07 PROCEDURE — 94660 CPAP INITIATION&MGMT: CPT

## 2024-03-07 PROCEDURE — 85025 COMPLETE CBC W/AUTO DIFF WBC: CPT

## 2024-03-07 PROCEDURE — 2700000000 HC OXYGEN THERAPY PER DAY

## 2024-03-07 PROCEDURE — C1760 CLOSURE DEV, VASC: HCPCS | Performed by: INTERNAL MEDICINE

## 2024-03-07 PROCEDURE — 6370000000 HC RX 637 (ALT 250 FOR IP): Performed by: STUDENT IN AN ORGANIZED HEALTH CARE EDUCATION/TRAINING PROGRAM

## 2024-03-07 PROCEDURE — 80048 BASIC METABOLIC PNL TOTAL CA: CPT

## 2024-03-07 PROCEDURE — 82947 ASSAY GLUCOSE BLOOD QUANT: CPT

## 2024-03-07 PROCEDURE — 99152 MOD SED SAME PHYS/QHP 5/>YRS: CPT | Performed by: INTERNAL MEDICINE

## 2024-03-07 PROCEDURE — 99153 MOD SED SAME PHYS/QHP EA: CPT | Performed by: INTERNAL MEDICINE

## 2024-03-07 PROCEDURE — C1725 CATH, TRANSLUMIN NON-LASER: HCPCS | Performed by: INTERNAL MEDICINE

## 2024-03-07 PROCEDURE — 02F03ZZ FRAGMENTATION IN CORONARY ARTERY, ONE ARTERY, PERCUTANEOUS APPROACH: ICD-10-PCS | Performed by: INTERNAL MEDICINE

## 2024-03-07 PROCEDURE — 36415 COLL VENOUS BLD VENIPUNCTURE: CPT

## 2024-03-07 PROCEDURE — 6370000000 HC RX 637 (ALT 250 FOR IP): Performed by: INTERNAL MEDICINE

## 2024-03-07 PROCEDURE — C1894 INTRO/SHEATH, NON-LASER: HCPCS | Performed by: INTERNAL MEDICINE

## 2024-03-07 PROCEDURE — 02F13ZZ FRAGMENTATION IN CORONARY ARTERY, TWO ARTERIES, PERCUTANEOUS APPROACH: ICD-10-PCS | Performed by: INTERNAL MEDICINE

## 2024-03-07 PROCEDURE — 94761 N-INVAS EAR/PLS OXIMETRY MLT: CPT

## 2024-03-07 PROCEDURE — 4A023N7 MEASUREMENT OF CARDIAC SAMPLING AND PRESSURE, LEFT HEART, PERCUTANEOUS APPROACH: ICD-10-PCS | Performed by: INTERNAL MEDICINE

## 2024-03-07 PROCEDURE — A4216 STERILE WATER/SALINE, 10 ML: HCPCS

## 2024-03-07 PROCEDURE — 2580000003 HC RX 258

## 2024-03-07 PROCEDURE — 2000000000 HC ICU R&B

## 2024-03-07 PROCEDURE — C1887 CATHETER, GUIDING: HCPCS | Performed by: INTERNAL MEDICINE

## 2024-03-07 PROCEDURE — C1769 GUIDE WIRE: HCPCS | Performed by: INTERNAL MEDICINE

## 2024-03-07 PROCEDURE — 93005 ELECTROCARDIOGRAM TRACING: CPT | Performed by: STUDENT IN AN ORGANIZED HEALTH CARE EDUCATION/TRAINING PROGRAM

## 2024-03-07 PROCEDURE — 85520 HEPARIN ASSAY: CPT

## 2024-03-07 PROCEDURE — C9600 PERC DRUG-EL COR STENT SING: HCPCS | Performed by: INTERNAL MEDICINE

## 2024-03-07 PROCEDURE — 99291 CRITICAL CARE FIRST HOUR: CPT | Performed by: INTERNAL MEDICINE

## 2024-03-07 PROCEDURE — 92972 PERQ TRLUML CORONRY LITHOTRP: CPT | Performed by: INTERNAL MEDICINE

## 2024-03-07 PROCEDURE — 99232 SBSQ HOSP IP/OBS MODERATE 35: CPT | Performed by: INTERNAL MEDICINE

## 2024-03-07 PROCEDURE — 2580000003 HC RX 258: Performed by: STUDENT IN AN ORGANIZED HEALTH CARE EDUCATION/TRAINING PROGRAM

## 2024-03-07 PROCEDURE — 71045 X-RAY EXAM CHEST 1 VIEW: CPT

## 2024-03-07 PROCEDURE — 99211 OFF/OP EST MAY X REQ PHY/QHP: CPT

## 2024-03-07 PROCEDURE — 2500000003 HC RX 250 WO HCPCS: Performed by: INTERNAL MEDICINE

## 2024-03-07 PROCEDURE — 92921 HC PRQ CARDIAC ANGIO ADDL ART: CPT | Performed by: INTERNAL MEDICINE

## 2024-03-07 PROCEDURE — 6360000002 HC RX W HCPCS: Performed by: INTERNAL MEDICINE

## 2024-03-07 DEVICE — STENT ONYXNG25034UX ONYX 2.50X34RX
Type: IMPLANTABLE DEVICE | Status: FUNCTIONAL
Brand: ONYX FRONTIER™

## 2024-03-07 DEVICE — STENT ONYXNG30038UX ONYX 3.00X38RX
Type: IMPLANTABLE DEVICE | Status: FUNCTIONAL
Brand: ONYX FRONTIER™

## 2024-03-07 RX ORDER — ACETAMINOPHEN 325 MG/1
650 TABLET ORAL EVERY 4 HOURS PRN
Status: DISCONTINUED | OUTPATIENT
Start: 2024-03-07 | End: 2024-03-10

## 2024-03-07 RX ORDER — SODIUM CHLORIDE 0.9 % (FLUSH) 0.9 %
5-40 SYRINGE (ML) INJECTION PRN
Status: DISCONTINUED | OUTPATIENT
Start: 2024-03-07 | End: 2024-03-15 | Stop reason: HOSPADM

## 2024-03-07 RX ORDER — SODIUM CHLORIDE 0.9 % (FLUSH) 0.9 %
5-40 SYRINGE (ML) INJECTION EVERY 12 HOURS SCHEDULED
Status: DISCONTINUED | OUTPATIENT
Start: 2024-03-07 | End: 2024-03-10

## 2024-03-07 RX ORDER — SODIUM CHLORIDE 9 MG/ML
INJECTION, SOLUTION INTRAVENOUS PRN
Status: DISCONTINUED | OUTPATIENT
Start: 2024-03-07 | End: 2024-03-15 | Stop reason: HOSPADM

## 2024-03-07 RX ORDER — DIPHENHYDRAMINE HYDROCHLORIDE 50 MG/ML
INJECTION INTRAMUSCULAR; INTRAVENOUS PRN
Status: DISCONTINUED | OUTPATIENT
Start: 2024-03-07 | End: 2024-03-07 | Stop reason: HOSPADM

## 2024-03-07 RX ORDER — LIDOCAINE HYDROCHLORIDE 10 MG/ML
INJECTION, SOLUTION INFILTRATION; PERINEURAL PRN
Status: DISCONTINUED | OUTPATIENT
Start: 2024-03-07 | End: 2024-03-07 | Stop reason: HOSPADM

## 2024-03-07 RX ORDER — FENTANYL CITRATE 50 UG/ML
INJECTION, SOLUTION INTRAMUSCULAR; INTRAVENOUS PRN
Status: DISCONTINUED | OUTPATIENT
Start: 2024-03-07 | End: 2024-03-07 | Stop reason: HOSPADM

## 2024-03-07 RX ORDER — BIVALIRUDIN 250 MG/5ML
INJECTION, POWDER, LYOPHILIZED, FOR SOLUTION INTRAVENOUS PRN
Status: DISCONTINUED | OUTPATIENT
Start: 2024-03-07 | End: 2024-03-07 | Stop reason: HOSPADM

## 2024-03-07 RX ORDER — INSULIN GLARGINE 100 [IU]/ML
25 INJECTION, SOLUTION SUBCUTANEOUS NIGHTLY
Status: DISCONTINUED | OUTPATIENT
Start: 2024-03-07 | End: 2024-03-08

## 2024-03-07 RX ORDER — METHYLPREDNISOLONE SODIUM SUCCINATE 40 MG/ML
INJECTION, POWDER, LYOPHILIZED, FOR SOLUTION INTRAMUSCULAR; INTRAVENOUS PRN
Status: DISCONTINUED | OUTPATIENT
Start: 2024-03-07 | End: 2024-03-07 | Stop reason: HOSPADM

## 2024-03-07 RX ORDER — MIDAZOLAM HYDROCHLORIDE 1 MG/ML
INJECTION INTRAMUSCULAR; INTRAVENOUS PRN
Status: DISCONTINUED | OUTPATIENT
Start: 2024-03-07 | End: 2024-03-07 | Stop reason: HOSPADM

## 2024-03-07 RX ADMIN — TORSEMIDE 20 MG: 20 TABLET ORAL at 08:11

## 2024-03-07 RX ADMIN — INSULIN GLARGINE 25 UNITS: 100 INJECTION, SOLUTION SUBCUTANEOUS at 20:17

## 2024-03-07 RX ADMIN — INSULIN LISPRO 4 UNITS: 100 INJECTION, SOLUTION INTRAVENOUS; SUBCUTANEOUS at 20:17

## 2024-03-07 RX ADMIN — TICAGRELOR 90 MG: 90 TABLET ORAL at 22:08

## 2024-03-07 RX ADMIN — HEPARIN SODIUM 10 UNITS/KG/HR: 10000 INJECTION, SOLUTION INTRAVENOUS at 05:27

## 2024-03-07 RX ADMIN — SODIUM CHLORIDE, PRESERVATIVE FREE 10 ML: 5 INJECTION INTRAVENOUS at 08:12

## 2024-03-07 RX ADMIN — MIDODRINE HYDROCHLORIDE 10 MG: 5 TABLET ORAL at 08:11

## 2024-03-07 RX ADMIN — SODIUM CHLORIDE, PRESERVATIVE FREE 10 ML: 5 INJECTION INTRAVENOUS at 11:34

## 2024-03-07 RX ADMIN — SODIUM CHLORIDE, PRESERVATIVE FREE 10 ML: 5 INJECTION INTRAVENOUS at 20:18

## 2024-03-07 RX ADMIN — MIDODRINE HYDROCHLORIDE 10 MG: 5 TABLET ORAL at 11:33

## 2024-03-07 RX ADMIN — ASPIRIN 81 MG 81 MG: 81 TABLET ORAL at 08:11

## 2024-03-07 RX ADMIN — INSULIN LISPRO 4 UNITS: 100 INJECTION, SOLUTION INTRAVENOUS; SUBCUTANEOUS at 11:33

## 2024-03-07 RX ADMIN — ATORVASTATIN CALCIUM 80 MG: 80 TABLET, FILM COATED ORAL at 22:08

## 2024-03-07 RX ADMIN — PANTOPRAZOLE SODIUM 40 MG: 40 INJECTION, POWDER, FOR SOLUTION INTRAVENOUS at 20:18

## 2024-03-07 RX ADMIN — INSULIN LISPRO 4 UNITS: 100 INJECTION, SOLUTION INTRAVENOUS; SUBCUTANEOUS at 08:32

## 2024-03-07 RX ADMIN — INSULIN LISPRO 16 UNITS: 100 INJECTION, SOLUTION INTRAVENOUS; SUBCUTANEOUS at 16:47

## 2024-03-07 RX ADMIN — PANTOPRAZOLE SODIUM 40 MG: 40 INJECTION, POWDER, FOR SOLUTION INTRAVENOUS at 08:12

## 2024-03-07 RX ADMIN — MIDODRINE HYDROCHLORIDE 10 MG: 5 TABLET ORAL at 16:48

## 2024-03-07 RX ADMIN — APIXABAN 5 MG: 5 TABLET, FILM COATED ORAL at 22:08

## 2024-03-07 ASSESSMENT — PAIN SCALES - GENERAL
PAINLEVEL_OUTOF10: 0
PAINLEVEL_OUTOF10: 8
PAINLEVEL_OUTOF10: 5

## 2024-03-07 ASSESSMENT — PAIN DESCRIPTION - FREQUENCY: FREQUENCY: INTERMITTENT

## 2024-03-07 ASSESSMENT — PAIN DESCRIPTION - PAIN TYPE: TYPE: CHRONIC PAIN

## 2024-03-07 ASSESSMENT — PAIN DESCRIPTION - LOCATION
LOCATION: CHEST;RIB CAGE
LOCATION: CHEST

## 2024-03-07 ASSESSMENT — PAIN DESCRIPTION - DESCRIPTORS: DESCRIPTORS: ACHING

## 2024-03-07 ASSESSMENT — PAIN DESCRIPTION - ONSET: ONSET: ON-GOING

## 2024-03-07 ASSESSMENT — PAIN DESCRIPTION - ORIENTATION: ORIENTATION: RIGHT

## 2024-03-07 NOTE — PROGRESS NOTES
[facility]   Department of Internal Medicine & Critical Care - Staff Internal Medicine Teaching Service     Critical Care - Daily Progress Note      Date and time: 3/7/2024 7:57 AM  Patient's name:  Kb Wilson  Medical Record Number: [id number]  Patient's account/billing number: [id number]  Patient's YOB: 1945  Age: 78 y.o.  Date of Admission: 2/29/2024  3:40 PM  Length of stay during current admission: 7  Primary Care Physician: Viridiana Taveras  ICU Attending Physician: ERZA SHAIKH     Code Status: Full Code  Reason for ICU admission: Cardiac arrest     Subjective:     Pt was seen and examined at bedside. Has been off levo since 8pm on 3/5.   HR labile, as high as 110's    Extubated on 3/1/24  Improving tolerance of bipap.  Requiring Bipap more often  Restarted on Torsemide 20mg daily 3/5/24 for concern of worsening pulmonary congestion and L pleural effusion, repeat CXR 3/06 somewhat improved    Off insulin gtt overnight. Glucose 143. Had received a dose of solumedrol on 3/4, likely cause of worsening hyperglycemia.    WBC 10.6 from 12.9  Hgb 8.4, stable  Plt 279    UOP 1475 mL (0.5 mL/kg/hr), Net 24hrs -830.5 mL, Net admit -2480.5 mL    Trop continue to rise, latest 1028, on heparin gtt  Cardiology recs: plan for next stage PCI on 3/6 or 3/7  GI recs: lower GI bleed resolved, likely diverticular, may advance diet as tolerated, ok for AC, f/u OP with GI      OVERNIGHT EVENTS:         Insulin gtt discontinued    URINE OUTPUT:   [x] Good  [] Low  [] Anuric      CONSULT SERVICES: CARDIOLOGY    BRIEF SUMMARY:    Kb Wilson is a 78 y.o. with a history of:  CAD status post CABG on Plavix  CHF with ejection fraction 20% has AICD  Paroxysmal A-fib on Eliquis  Diabetes mellitus  Hypertension  Stage III kidney disease     Patient presented to [place] emergency department on 2/27/2024 for bright red blood when he wiped when going to the bathroom just prior  Statement  I have discussed the care of Kb Wilson, including pertinent history and exam findings with the resident. I have reviewed the key elements of all parts of the encounter with the resident. I have seen and examined the patient with the resident.  I agree with the assessment and plan and status of the problem list as documented.    He was on nasal cannula this morning hemodynamically stable not on pressor support he did get BiPAP at night use BiPAP intermittently during the daytime.  He went for cardiac catheter[place] he was supposed to have a staged PCI of left main/LCx today.  If hemodynamically stable later today will DC femoral line.  Continue on BiPAP alternating with nasal cannula.  Patient is started on Brilinta post cath.  Will ask cardiology if he still need heparin drip.      Discussed with nursing staff, treatment and plan discussed.  Discussed with respiratory therapist.    Total critical care time caring for this patient with life threatening, unstable organ failure, including direct patient contact, management of life support systems, review of data including imaging and labs, discussions with other team members and physicians at least 30  Min so far today, excluding procedures.       Please note that this chart was generated using voice recognition Dragon dictation software. Although every effort was made to ensure the accuracy of this automated transcription, some errors in transcription may have occurred.     Juan Luis Zelaya MD  3/7/2024 12:32 PM

## 2024-03-07 NOTE — PROGRESS NOTES
[facility]    Progress               Today's Date: 3/7/2024  Patient Name: Kb Wilson  Date of admission: 2/29/2024  3:40 PM  Patient's age: 78 y.o., 1945  Admission Dx: Cardiac arrest (HCC) [I46.9]    Subjective:  Patient seen and examined at bedside.  Used BIPAP overnight alternating with nasal cannula 4L.  NPO since midnight.  Currently normal sinus rhythm, off pressure support.   Denies any chest pain, palpitations overnight.   Stable Kidney functions, he is cleared for cardiac cath from nephrology standpoint.     Past Medical History:   has a past medical history of CAD (coronary artery disease), CHF (congestive heart failure) ([facility]), Diabetes mellitus ([facility]), and Hypertension.    Past Surgical History:   has a past surgical history that includes Cardiac defibrillator placement; Coronary artery bypass graft; Upper gastrointestinal endoscopy (N/A, 2/28/2024); and Colonoscopy (N/A, 2/29/2024).     Home Medications:    Prior to Admission medications    Medication Sig Start Date End Date Taking? Authorizing Provider   carvedilol (COREG) 3.125 MG tablet Take 1 tablet by mouth 2 times daily (with meals) 2/25/24   Marlena Hedrick MD   aspirin 81 MG chewable tablet Take 1 tablet by mouth daily  Patient not taking: Reported on 2/27/2024 2/26/24   Marlena Hedrick MD   atorvastatin (LIPITOR) 80 MG tablet Take 1 tablet by mouth nightly 2/25/24   Marlena Hedrick MD   calcitRIOL (ROCALTROL) 0.25 MCG capsule Take 1 capsule by mouth as needed Three times weekly 7/8/23   Laura More MD   apixaban (ELIQUIS) 5 MG TABS tablet Take 1 tablet by mouth 2 times daily 7/8/23   Laura More MD   famotidine (PEPCID) 40 MG tablet Take 1 tablet by mouth daily 7/8/23   Laura More MD   OZEMPIC, 0.25 OR 0.5 MG/DOSE, 2 MG/3ML SOPN  1/3/24   Laura More MD   insulin glargine (LANTUS;BASAGLAR) 100 UNIT/ML injection pen Inject 40 Units into the skin daily (with breakfast) 2/8/24    respiratory failure (improving)  Anemia (stable)    RECOMMENDATIONS:  Planned for staged PCI to Left main and Lcx 3/7, keep NPO  Nephrology on board, kidney functions at baseline. Cleared for cardiac cath from nephrology standpoint at any time.   GI note reviewed, per GI okay for anticoagulation and antiplatelets.   Continue rest of management as per critical care team.  Further recommendations to follow after rounding with the attending.     Kayden Mejia MD   Internal Medicine Resident, PGY-1  Cardiology service  [facility]; [place], [place]  3/7/2024, 7:55 AM    Attending Cardiologist Addendum: I have reviewed and performed the history, physical, subjective, objective, assessment, and plan with the resident/fellow/NP and agree with the note. I performed the history and physical personally. I have made changes to the note above as needed.    Thank you for allowing me to participate in the care of this patient, please do not hesitate to call if you have any questions.    Mati Garnica DO, [facility]  Board Certified Cardiologist  Fellow of the American College of Cardiology     of Medicine [facility]   of Medicine [facility]  [facility]  [phone]

## 2024-03-07 NOTE — PROCEDURES
[facility]        Date:   3/7/2024  Patient name: Kb Wislon  Date of admission:  2/29/2024  3:40 PM  MRN:   [id number]  YOB: 1945    PCI    Operators:  Primary: Wendy Moreno MD.  Assistant:     Indications for PCI:    Procedure performed: Cardiac cath.    Access: Left femoral artery      Procedure: After informed consent was obtained with explanation of the risks and benefits, patient was brought to the cath lab. The left groin were prepped and draped in sterile fashion. 1% lidocaine was used for local block. The Femoral artery was cannulated with 6  Fr sheath with brisk arterial blood return. The side port was frequently flushed and aspirated with normal saline.    EBL is 10 mL      Findings:    Left main: 70% stenosis extending into proximal left circumflex, length is 34 mm, PARISH-3 flow, underwent PCI with shockwave followed by  DAYTON using 3.0 x 38 mm Elliott stent with 0% residual stenosis and PARISH-3 flow.      LCX: Mid 80% stenosis, length is 30 mm, PARISH-3 flow, underwent PCI with shockwave followed by DAYTON using 2.5 x 34 mm [place] stent with 0% residual stenosis and PARISH-3 flow      Conclusions:  Successful PCI/shockwave/DAYTON to left main extending into proximal and mid LCx      Recommendation:  Routine post PCI orders  Medical treatments.  Risk factors modifications.      Electronically signed by Wendy Moreno MD on 3/7/2024 at 12:54 PM      [facility]  [phone]

## 2024-03-07 NOTE — PROGRESS NOTES
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  Serologies unremarkable.  UPC 0.24.  No hydronephrosis on scanning  Status post cardiac arrest multiple times  Status post PCI with history of CABG in the past  Ischemic cardiomyopathy ejection fraction 20 to 25%/left ventricular diastolic dysfunction/cor pulmonale  Moderate MR/moderate TR with RVSP around 58  Type 2 diabetes  History of hypertension  Anemia    PLAN     Continue torsemide  Watch for ROSANNA next 48 hours  Continue to hold ARB/Farxiga  Anticipate renal recovery      SUBJECTIVE   Patient was hospitalized for evaluation of cardiac arrest which he sustained during colonoscopy procedure being carried out which he sustained.  He had multiple CODE BLUE total of 5 times [facility].  Colonoscopy did not show any active bleeding.    Patient back from PCI.  Details unavailable to me.  Watch for ROSANNA next 48 hours.  Currently feels much better.  No chest pain shortness of breath or dizziness.  Still on 4 L supplemental oxygen and intermittently using BiPAP.  Transition to p.o. torsemide from yesterday.  Urine output decent.  Overall negative cumulative balance    OBJECTIVE     Vitals:    03/07/24 0700 03/07/24 0800 03/07/24 0810 03/07/24 0925   BP: (!) 125/59 105/71     Pulse: 93 81 85    Resp: 24 19 25    Temp:  97.5 °F (36.4 °C)     TempSrc:  Axillary     SpO2: 97% 97% 94% 97%   Weight:       Height:         24HR INTAKE/OUTPUT:    Intake/Output Summary (Last 24 hours) at 3/7/2024 1039  Last data filed at 3/7/2024 0648  Gross per 24 hour   Intake 528.87 ml   Output 1200 ml   Net -671.13 ml       General appearance: Awake alert on supplemental oxygen 4 L nasal cannula  Respiratory::vesicular breath sounds,no wheeze/crackles  Cardiovascular:S1 S2 normal,no gallop or organic murmur.  Abdomen:Non tender/non distended.Bowel sounds

## 2024-03-07 NOTE — PLAN OF CARE
Problem: Chronic Conditions and Co-morbidities  Goal: Patient's chronic conditions and co-morbidity symptoms are monitored and maintained or improved  3/6/2024 2248 by Radha Alston RN  Outcome: Progressing     Problem: Safety - Adult  Goal: Free from fall injury  Recent Flowsheet Documentation  Taken 3/6/2024 2200 by Radha Alston RN  Free From Fall Injury: Instruct family/caregiver on patient safety     Problem: Discharge Planning  Goal: Discharge to home or other facility with appropriate resources  3/6/2024 2248 by Radha Alston RN  Outcome: Progressing     Problem: Respiratory - Adult  Goal: Achieves optimal ventilation and oxygenation  3/6/2024 2248 by Radha Alston RN  Outcome: Progressing  Flowsheets (Taken 3/6/2024 2000)  Achieves optimal ventilation and oxygenation: Assess for changes in respiratory status     Problem: Pain  Goal: Verbalizes/displays adequate comfort level or baseline comfort level  3/6/2024 2248 by Radha Alston RN  Outcome: Progressing  Flowsheets (Taken 3/6/2024 2000)  Verbalizes/displays adequate comfort level or baseline comfort level: Encourage patient to monitor pain and request assistance     Problem: Skin/Tissue Integrity  Goal: Absence of new skin breakdown  Description: 1.  Monitor for areas of redness and/or skin breakdown  2.  Assess vascular access sites hourly  3.  Every 4-6 hours minimum:  Change oxygen saturation probe site  4.  Every 4-6 hours:  If on nasal continuous positive airway pressure, respiratory therapy assess nares and determine need for appliance change or resting period.  3/6/2024 2248 by Radha Alston RN  Outcome: Progressing     Problem: ABCDS Injury Assessment  Goal: Absence of physical injury  Recent Flowsheet Documentation  Taken 3/6/2024 2200 by Radha Alston RN  Absence of Physical Injury: Implement safety measures based on patient assessment

## 2024-03-07 NOTE — PROGRESS NOTES
Comprehensive Nutrition Assessment    Type and Reason for Visit:  Initial, RD Nutrition Re-Screen/LOS    Nutrition Recommendations/Plan:   Continue current diet.  Encourage/monitor intakes as tolerated.  Monitor need for oral supplements.  Will monitor labs, weights, and plan of care.     Malnutrition Assessment:  Malnutrition Status:  At risk for malnutrition (03/07/24 1530)    Context:  Acute Illness     Findings of the 6 clinical characteristics of malnutrition:  Energy Intake:  75% or less of estimated energy requirements for 7 or more days  Weight Loss:  No significant weight loss     Body Fat Loss:  No significant body fat loss   Muscle Mass Loss:  No significant muscle mass loss  Fluid Accumulation:  Mild (to Moderate) Generalized, Extremities   Strength:  Not Performed    Nutrition Assessment:    Chart reviewed/pt seen for length of stay.  Admitted for GI bleed.  PMH: CAD, CHF, CKD, DM, HTN.  During admission pt has been on a diet intermittently with recorded PO intakes of 1-100% of meals.  Pt NPO at visit this morning but has been recently restarted on an oral diet.  Will monitor PO intakes and offer oral supplements if needed with meals.  Weights reviewed.  Labs reviewed: Glucose 229-234 mg/dL.  Meds include: Lantus, Humalog SS.    Nutrition Related Findings:    Labs/Meds reviewed. Wound Type: Diabetic Ulcer       Current Nutrition Intake & Therapies:    Average Meal Intake: Unable to assess (Diet recently restarted - recorded intakes of 1-100% of meals.)     ADULT DIET; Regular; Low Fat/Low Chol/High Fiber/2 gm Na  Additional Calorie Sources:  None    Anthropometric Measures:  Height: 190.5 cm (6' 3\")  Ideal Body Weight (IBW): 196 lbs (89 kg)    Admission Body Weight: 125.5 kg (276 lb 10.8 oz)  Current Body Weight: 128.9 kg (284 lb 2.8 oz), 145 % IBW. Weight Source: Bed Scale  Current BMI (kg/m2): 35.5  Usual Body Weight: 123.4 kg (272 lb 1 oz) (2/22/24 bed scale per chart review)  % Weight Change  (Calculated): 4.5  Weight Adjustment For: No Adjustment                 BMI Categories: Obese Class 2 (BMI 35.0 -39.9)    Estimated Daily Nutrient Needs:  Energy Requirements Based On: Formula  Weight Used for Energy Requirements: Current  Energy (kcal/day): 5280-5275 kcals/day  Weight Used for Protein Requirements: Ideal  Protein (g/day): 100-130 gm pro/day  Method Used for Fluid Requirements: Other (Comment)  Fluid (ml/day): per MD    Nutrition Diagnosis:   Inadequate oral intake related to  (GI bleed; current condition) as evidenced by  (recent start of oral diet; variable intakes during admission)    Nutrition Interventions:   Food and/or Nutrient Delivery: Continue Current Diet (Monitor need for oral supplements.)  Nutrition Education/Counseling: No recommendation at this time  Coordination of Nutrition Care: Continue to monitor while inpatient  Plan of Care discussed with: Patient    Goals:  Previous Goal Met:  (Goal Set)  Goals: PO intake 75% or greater, prior to discharge       Nutrition Monitoring and Evaluation:   Behavioral-Environmental Outcomes: None Identified  Food/Nutrient Intake Outcomes: Food and Nutrient Intake  Physical Signs/Symptoms Outcomes: Biochemical Data, GI Status, Fluid Status or Edema, Weight, Skin, Nutrition Focused Physical Findings    Discharge Planning:    Too soon to determine     Lisa Bates RD, LD  Contact: [phone]

## 2024-03-07 NOTE — PROGRESS NOTES
[facility] Nurse  Consult Note       NAME:  Kb Wilson  MEDICAL RECORD NUMBER:  [id number]  AGE: 78 y.o.   GENDER: male  : 1945  TODAY'S DATE:  3/7/2024    Subjective:     Reason for WOCN Evaluation and Assessment: \"chronic diabetic foot ulcer\"      Kb Wilson is a 78 y.o. male referred by:   [x] Physician  [] Nursing  [] Other:     Wound Identification:  Wound Type: diabetic  Contributing Factors: diabetes, chronic pressure, decreased mobility, shear force, and decreased tissue oxygenation        Right plantar DFU present for multi-years. Small amount of Silvadene cream applied daily at home.     Objective:      /70   Pulse 85   Temp 97.7 °F (36.5 °C) (Axillary)   Resp 27   Ht 1.905 m (6' 3\")   Wt 128.9 kg (284 lb 2.8 oz)   SpO2 92%   BMI 35.52 kg/m²   Alonso Risk Score: Alonso Scale Score: 17    LABS    CBC:   Lab Results   Component Value Date/Time    WBC 10.6 2024 05:21 AM    RBC 2.90 2024 05:21 AM    HGB 8.4 2024 05:21 AM    HCT 27.9 2024 05:21 AM     CMP:  Albumin:    Lab Results   Component Value Date/Time    LABALBU 2.6 2024 05:47 AM     PT/INR:    Lab Results   Component Value Date/Time    PROTIME 13.9 2024 11:56 AM    INR 1.1 2024 11:56 AM     HgBA1c:    Lab Results   Component Value Date/Time    LABA1C 7.6 2024 05:47 AM     PTT: No components found for: \"LABPTT\"      Assessment:       Measurements:  Wound 24 Right;Plantar (Active)   Wound Image   24 1305   Wound Etiology Diabetic 24 1305   Wound Cleansed Soap and water 24 2100   Dressing/Treatment Open to air 24 1305   Offloading for Diabetic Foot Ulcers Offloading not ordered 24 1200   Wound Length (cm) 0.7 cm 24 1305   Wound Width (cm) 1.1 cm 03/07/24 1305   Wound Surface Area (cm^2) 0.77 cm^2 24 1305   Wound Assessment Eschar dry 24 1305   Drainage Amount None (dry) 24 1305   Odor None 24 1305  reinforcement  [] Unsuccessful      [] Verbal Understanding  [] Demonstrated understanding       [] No evidence of learning  [] Refused teaching         [] N/A    Contact the Wound Ostomy RN on-call during working hours Monday-Friday [phone] via [employer] by searching \"wound\" under \"groups\" and selecting the on-call clinician. Sending messages via individual names will not reach a clinician.        Electronically signed by Nancy Silva RN, CWON on 3/7/2024 at 2:38 PM

## 2024-03-07 NOTE — PLAN OF CARE
Problem: Chronic Conditions and Co-morbidities  Goal: Patient's chronic conditions and co-morbidity symptoms are monitored and maintained or improved  Outcome: Progressing  Flowsheets (Taken 3/7/2024 0800)  Care Plan - Patient's Chronic Conditions and Co-Morbidity Symptoms are Monitored and Maintained or Improved:   Monitor and assess patient's chronic conditions and comorbid symptoms for stability, deterioration, or improvement   Collaborate with multidisciplinary team to address chronic and comorbid conditions and prevent exacerbation or deterioration     Problem: Safety - Adult  Goal: Free from fall injury  Outcome: Progressing     Problem: Discharge Planning  Goal: Discharge to home or other facility with appropriate resources  Outcome: Progressing  Flowsheets (Taken 3/7/2024 0800)  Discharge to home or other facility with appropriate resources: Refer to discharge planning if patient needs post-hospital services based on physician order or complex needs related to functional status, cognitive ability or social support system     Problem: Respiratory - Adult  Goal: Achieves optimal ventilation and oxygenation  Outcome: Progressing  Flowsheets (Taken 3/7/2024 0800)  Achieves optimal ventilation and oxygenation:   Assess for changes in respiratory status   Assess for changes in mentation and behavior   Oxygen supplementation based on oxygen saturation or arterial blood gases     Problem: Pain  Goal: Verbalizes/displays adequate comfort level or baseline comfort level  Outcome: Progressing     Problem: Skin/Tissue Integrity  Goal: Absence of new skin breakdown  Description: 1.  Monitor for areas of redness and/or skin breakdown  2.  Assess vascular access sites hourly  3.  Every 4-6 hours minimum:  Change oxygen saturation probe site  4.  Every 4-6 hours:  If on nasal continuous positive airway pressure, respiratory therapy assess nares and determine need for appliance change or resting period.  Outcome:  Progressing     Problem: ABCDS Injury Assessment  Goal: Absence of physical injury  Outcome: Progressing

## 2024-03-08 LAB
ANION GAP SERPL CALCULATED.3IONS-SCNC: 9 MMOL/L (ref 9–16)
BASOPHILS # BLD: 0 K/UL (ref 0–0.2)
BASOPHILS NFR BLD: 0 % (ref 0–2)
BNP SERPL-MCNC: ABNORMAL PG/ML (ref 0–300)
BUN SERPL-MCNC: 44 MG/DL (ref 8–23)
CALCIUM SERPL-MCNC: 7.9 MG/DL (ref 8.6–10.4)
CHLORIDE SERPL-SCNC: 105 MMOL/L (ref 98–107)
CO2 SERPL-SCNC: 24 MMOL/L (ref 20–31)
CREAT SERPL-MCNC: 1.6 MG/DL (ref 0.7–1.2)
EKG ATRIAL RATE: 110 BPM
EKG ATRIAL RATE: 84 BPM
EKG P AXIS: 71 DEGREES
EKG P-R INTERVAL: 192 MS
EKG P-R INTERVAL: 214 MS
EKG Q-T INTERVAL: 360 MS
EKG Q-T INTERVAL: 426 MS
EKG QRS DURATION: 132 MS
EKG QRS DURATION: 156 MS
EKG QTC CALCULATION (BAZETT): 487 MS
EKG QTC CALCULATION (BAZETT): 503 MS
EKG R AXIS: -20 DEGREES
EKG R AXIS: 20 DEGREES
EKG T AXIS: 156 DEGREES
EKG T AXIS: 160 DEGREES
EKG VENTRICULAR RATE: 110 BPM
EKG VENTRICULAR RATE: 84 BPM
EOSINOPHIL # BLD: 0 K/UL (ref 0–0.4)
EOSINOPHILS RELATIVE PERCENT: 0 % (ref 1–4)
ERYTHROCYTE [DISTWIDTH] IN BLOOD BY AUTOMATED COUNT: 20.3 % (ref 11.8–14.4)
GFR SERPL CREATININE-BSD FRML MDRD: 45 ML/MIN/1.73M2
GLUCOSE BLD-MCNC: 195 MG/DL (ref 75–110)
GLUCOSE BLD-MCNC: 238 MG/DL (ref 75–110)
GLUCOSE BLD-MCNC: 263 MG/DL (ref 75–110)
GLUCOSE BLD-MCNC: 313 MG/DL (ref 75–110)
GLUCOSE SERPL-MCNC: 273 MG/DL (ref 74–99)
HCT VFR BLD AUTO: 28.5 % (ref 40.7–50.3)
HGB BLD-MCNC: 7.8 G/DL (ref 13–17)
IMM GRANULOCYTES # BLD AUTO: 0 K/UL (ref 0–0.3)
IMM GRANULOCYTES NFR BLD: 0 %
LYMPHOCYTES NFR BLD: 0.5 K/UL (ref 1–4.8)
LYMPHOCYTES RELATIVE PERCENT: 7 % (ref 24–44)
MCH RBC QN AUTO: 28.3 PG (ref 25.2–33.5)
MCHC RBC AUTO-ENTMCNC: 27.4 G/DL (ref 28.4–34.8)
MCV RBC AUTO: 103.3 FL (ref 82.6–102.9)
MONOCYTES NFR BLD: 0.29 K/UL (ref 0.1–0.8)
MONOCYTES NFR BLD: 4 % (ref 1–7)
MORPHOLOGY: ABNORMAL
NEUTROPHILS NFR BLD: 89 % (ref 36–66)
NEUTS SEG NFR BLD: 6.41 K/UL (ref 1.8–7.7)
NRBC BLD-RTO: 1.2 PER 100 WBC
NUCLEATED RED BLOOD CELLS: 2 PER 100 WBC
PLATELET # BLD AUTO: 274 K/UL (ref 138–453)
PMV BLD AUTO: 10.7 FL (ref 8.1–13.5)
POTASSIUM SERPL-SCNC: 5.3 MMOL/L (ref 3.7–5.3)
RBC # BLD AUTO: 2.76 M/UL (ref 4.21–5.77)
SODIUM SERPL-SCNC: 138 MMOL/L (ref 136–145)
WBC OTHER # BLD: 7.2 K/UL (ref 3.5–11.3)

## 2024-03-08 PROCEDURE — 6360000002 HC RX W HCPCS

## 2024-03-08 PROCEDURE — 6370000000 HC RX 637 (ALT 250 FOR IP)

## 2024-03-08 PROCEDURE — 6370000000 HC RX 637 (ALT 250 FOR IP): Performed by: INTERNAL MEDICINE

## 2024-03-08 PROCEDURE — 2580000003 HC RX 258

## 2024-03-08 PROCEDURE — 94660 CPAP INITIATION&MGMT: CPT

## 2024-03-08 PROCEDURE — 83880 ASSAY OF NATRIURETIC PEPTIDE: CPT

## 2024-03-08 PROCEDURE — 99291 CRITICAL CARE FIRST HOUR: CPT | Performed by: INTERNAL MEDICINE

## 2024-03-08 PROCEDURE — 6370000000 HC RX 637 (ALT 250 FOR IP): Performed by: STUDENT IN AN ORGANIZED HEALTH CARE EDUCATION/TRAINING PROGRAM

## 2024-03-08 PROCEDURE — 94761 N-INVAS EAR/PLS OXIMETRY MLT: CPT

## 2024-03-08 PROCEDURE — 85025 COMPLETE CBC W/AUTO DIFF WBC: CPT

## 2024-03-08 PROCEDURE — 2580000003 HC RX 258: Performed by: STUDENT IN AN ORGANIZED HEALTH CARE EDUCATION/TRAINING PROGRAM

## 2024-03-08 PROCEDURE — 2000000000 HC ICU R&B

## 2024-03-08 PROCEDURE — 82947 ASSAY GLUCOSE BLOOD QUANT: CPT

## 2024-03-08 PROCEDURE — A4216 STERILE WATER/SALINE, 10 ML: HCPCS

## 2024-03-08 PROCEDURE — 36415 COLL VENOUS BLD VENIPUNCTURE: CPT

## 2024-03-08 PROCEDURE — C9113 INJ PANTOPRAZOLE SODIUM, VIA: HCPCS

## 2024-03-08 PROCEDURE — 80048 BASIC METABOLIC PNL TOTAL CA: CPT

## 2024-03-08 PROCEDURE — 2700000000 HC OXYGEN THERAPY PER DAY

## 2024-03-08 RX ORDER — TORSEMIDE 20 MG/1
40 TABLET ORAL DAILY
Status: DISCONTINUED | OUTPATIENT
Start: 2024-03-09 | End: 2024-03-10

## 2024-03-08 RX ORDER — INSULIN GLARGINE 100 [IU]/ML
30 INJECTION, SOLUTION SUBCUTANEOUS NIGHTLY
Status: DISCONTINUED | OUTPATIENT
Start: 2024-03-08 | End: 2024-03-10

## 2024-03-08 RX ORDER — TORSEMIDE 20 MG/1
20 TABLET ORAL ONCE
Status: COMPLETED | OUTPATIENT
Start: 2024-03-08 | End: 2024-03-08

## 2024-03-08 RX ADMIN — MIDODRINE HYDROCHLORIDE 10 MG: 5 TABLET ORAL at 16:12

## 2024-03-08 RX ADMIN — ACETAMINOPHEN 650 MG: 325 TABLET ORAL at 11:50

## 2024-03-08 RX ADMIN — SODIUM CHLORIDE, PRESERVATIVE FREE 10 ML: 5 INJECTION INTRAVENOUS at 08:03

## 2024-03-08 RX ADMIN — TICAGRELOR 90 MG: 90 TABLET ORAL at 20:04

## 2024-03-08 RX ADMIN — MIDODRINE HYDROCHLORIDE 10 MG: 5 TABLET ORAL at 11:49

## 2024-03-08 RX ADMIN — ATORVASTATIN CALCIUM 80 MG: 80 TABLET, FILM COATED ORAL at 20:04

## 2024-03-08 RX ADMIN — ACETAMINOPHEN 650 MG: 325 TABLET ORAL at 16:36

## 2024-03-08 RX ADMIN — APIXABAN 5 MG: 5 TABLET, FILM COATED ORAL at 08:01

## 2024-03-08 RX ADMIN — INSULIN GLARGINE 30 UNITS: 100 INJECTION, SOLUTION SUBCUTANEOUS at 20:03

## 2024-03-08 RX ADMIN — SODIUM CHLORIDE, PRESERVATIVE FREE 10 ML: 5 INJECTION INTRAVENOUS at 20:04

## 2024-03-08 RX ADMIN — TORSEMIDE 20 MG: 20 TABLET ORAL at 11:49

## 2024-03-08 RX ADMIN — INSULIN LISPRO 12 UNITS: 100 INJECTION, SOLUTION INTRAVENOUS; SUBCUTANEOUS at 11:53

## 2024-03-08 RX ADMIN — APIXABAN 5 MG: 5 TABLET, FILM COATED ORAL at 20:04

## 2024-03-08 RX ADMIN — TICAGRELOR 90 MG: 90 TABLET ORAL at 08:03

## 2024-03-08 RX ADMIN — ASPIRIN 81 MG 81 MG: 81 TABLET ORAL at 08:01

## 2024-03-08 RX ADMIN — PANTOPRAZOLE SODIUM 40 MG: 40 INJECTION, POWDER, FOR SOLUTION INTRAVENOUS at 08:01

## 2024-03-08 RX ADMIN — PANTOPRAZOLE SODIUM 40 MG: 40 INJECTION, POWDER, FOR SOLUTION INTRAVENOUS at 20:04

## 2024-03-08 RX ADMIN — SODIUM CHLORIDE, PRESERVATIVE FREE 10 ML: 5 INJECTION INTRAVENOUS at 20:05

## 2024-03-08 RX ADMIN — MIDODRINE HYDROCHLORIDE 10 MG: 5 TABLET ORAL at 08:01

## 2024-03-08 RX ADMIN — INSULIN LISPRO 8 UNITS: 100 INJECTION, SOLUTION INTRAVENOUS; SUBCUTANEOUS at 08:08

## 2024-03-08 RX ADMIN — INSULIN LISPRO 4 UNITS: 100 INJECTION, SOLUTION INTRAVENOUS; SUBCUTANEOUS at 16:39

## 2024-03-08 RX ADMIN — TORSEMIDE 20 MG: 20 TABLET ORAL at 08:01

## 2024-03-08 ASSESSMENT — PAIN SCALES - GENERAL
PAINLEVEL_OUTOF10: 5
PAINLEVEL_OUTOF10: 5
PAINLEVEL_OUTOF10: 0
PAINLEVEL_OUTOF10: 5

## 2024-03-08 ASSESSMENT — PAIN DESCRIPTION - ORIENTATION
ORIENTATION: RIGHT;LEFT
ORIENTATION: RIGHT;LEFT
ORIENTATION: RIGHT

## 2024-03-08 ASSESSMENT — PAIN DESCRIPTION - LOCATION
LOCATION: CHEST

## 2024-03-08 ASSESSMENT — PAIN DESCRIPTION - DESCRIPTORS
DESCRIPTORS: ACHING
DESCRIPTORS: ACHING

## 2024-03-08 ASSESSMENT — PAIN DESCRIPTION - ONSET: ONSET: ON-GOING

## 2024-03-08 ASSESSMENT — PAIN DESCRIPTION - FREQUENCY: FREQUENCY: INTERMITTENT

## 2024-03-08 ASSESSMENT — PAIN DESCRIPTION - PAIN TYPE: TYPE: ACUTE PAIN

## 2024-03-08 NOTE — PLAN OF CARE
Problem: Chronic Conditions and Co-morbidities  Goal: Patient's chronic conditions and co-morbidity symptoms are monitored and maintained or improved  3/7/2024 2011 by Radha Alston RN  Outcome: Progressing     Problem: Safety - Adult  Goal: Free from fall injury  Recent Flowsheet Documentation  Taken 3/7/2024 2000 by Radha Alston RN  Free From Fall Injury: Instruct family/caregiver on patient safety     Problem: Discharge Planning  Goal: Discharge to home or other facility with appropriate resources  3/7/2024 2011 by Radha Alston RN  Outcome: Progressing     Problem: Respiratory - Adult  Goal: Achieves optimal ventilation and oxygenation  3/7/2024 2011 by Radha Alston RN  Outcome: Progressing     Problem: Pain  Goal: Verbalizes/displays adequate comfort level or baseline comfort level  3/7/2024 2011 by Radha Alston RN  Outcome: Progressing  Flowsheets (Taken 3/7/2024 2000)  Verbalizes/displays adequate comfort level or baseline comfort level: Encourage patient to monitor pain and request assistance     Problem: Skin/Tissue Integrity  Goal: Absence of new skin breakdown  Description: 1.  Monitor for areas of redness and/or skin breakdown  2.  Assess vascular access sites hourly  3.  Every 4-6 hours minimum:  Change oxygen saturation probe site  4.  Every 4-6 hours:  If on nasal continuous positive airway pressure, respiratory therapy assess nares and determine need for appliance change or resting period.  3/7/2024 2011 by Radha Alston RN  Outcome: Progressing     Problem: ABCDS Injury Assessment  Goal: Absence of physical injury  Recent Flowsheet Documentation  Taken 3/7/2024 2000 by Radha Alston RN  Absence of Physical Injury: Implement safety measures based on patient assessment

## 2024-03-08 NOTE — PROGRESS NOTES
[facility]   Department of Internal Medicine & Critical Care - Staff Internal Medicine Teaching Service     Critical Care - Daily Progress Note      Date and time: 3/8/2024 7:48 AM  Patient's name:  Kb Wilson  Medical Record Number: [id number]  Patient's account/billing number: [id number]  Patient's YOB: 1945  Age: 78 y.o.  Date of Admission: 2/29/2024  3:40 PM  Length of stay during current admission: 8  Primary Care Physician: Viridiana Taveras  ICU Attending Physician: EZRA SHAIKH     Code Status: Full Code  Reason for ICU admission: Cardiac arrest     Subjective:     Off levo since 3/05  Extubated on 3/01  Improving tolerance of alternating BiPAP and NC  Restarted on Torsemide 20mg daily 3/05 for concern of worsening pulmonary congestion and L pleural effusion, repeat CXR 3/06 somewhat improved    Glucose elevated. Had received a dose of solumedrol on 3/07, causing worsening hyperglycemia.  Lantus 25 qhs and HDCS insulin on, anticipate improvement now that steroids are off    WBC 7.2 from 10.6  Hgb 7.8 from 8.4  Plt 274 from 279    S/p PCI w/ DAYTON placement to L main extending into proximal and mid Lcx  On ASA, Brilinta, Eliquis    GI recs: lower GI bleed resolved, likely diverticular, may advance diet as tolerated, ok for AC, f/u OP with GI      URINE OUTPUT:   [x] Good  [] Low  [] Anuric      CONSULT SERVICES: CARDIOLOGY    BRIEF SUMMARY:    Kb Wilson is a 78 y.o. with a history of:  CAD status post CABG on Plavix  CHF with ejection fraction 20% has AICD  Paroxysmal A-fib on Eliquis  Diabetes mellitus  Hypertension  Stage III kidney disease     Patient presented to [place] emergency department on 2/27/2024 for bright red blood when he wiped when going to the bathroom just prior to presenting to the emergency room.  Concern for GI bleed.  CT abdomen pelvis without contrast suggest diverticulosis.  Patient was admitted for GI consult was  started on Protonix infusion.  Patient had EGD that was normal.  Patient was prepped for colonoscopy today.  Patient was given propofol and the colonoscopy was about to begin when he cardiac arrested.  Cardiac arrested a total of 3 times in the OR.  He was intubated.  He was then transferred to the ICU where he coded 2 more times.  He had a CODE BLUE total of 5 times [facility].  He was transferred to [facility] for cardiac evaluation.     Patient was able to have some of the colonoscopy done that did not show any active bleeding however does show diverticula consistent with diverticulosis.  Recommendations from GI doctor at [place] patient's hemoglobin drops again to get a CTA abdomen pelvis and transfuse.  Keep hemoglobin above 7.  Patient is on a Protonix drip.     Initial hemoglobin 8.5, white blood cell count 20.8, chest x-ray shows pulmonary vascular congestion    Interval hx:  3/1/2024: Extubate, consult GI, wean down pressors.  3/2/2024: Started on clear liquid diet  3/3/2024: NPO at midnight for cath on 3/4  3/4- cath showed only vessel patent is LIMA, planning for staged PCI  3/07: Underwent DAYTON to L main extending into LCx     REVIEW OF SYSTEMS  Review of Systems   Constitutional:  Negative for fever.   Respiratory:  Negative for cough and shortness of breath.    Cardiovascular:  Positive for chest pain. Negative for leg swelling.   Gastrointestinal:  Negative for abdominal pain, diarrhea, nausea and vomiting.   Musculoskeletal:  Negative for arthralgias and myalgias.   Psychiatric/Behavioral:  Negative for agitation and confusion.         OBJECTIVE:     VITAL SIGNS:  BP (!) 107/90   Pulse 80   Temp 97.9 °F (36.6 °C) (Axillary)   Resp 24   Ht 1.905 m (6' 3\")   Wt 120.5 kg (265 lb 10.5 oz)   SpO2 98%   BMI 33.20 kg/m²   Tmax over 24 hours:  Temp (24hrs), Av.6 °F (36.4 °C), Min:96.8 °F (36 °C), Max:97.9 °F (36.6 °C)        Intake/Output Summary (Last 24 hours) at  as documented.    He is hemodynamically stable.  Does not complain of chest pain.  He continues to require BiPAP and intermittently most of the time during the daytime he asked for the BiPAP.  He is using BiPAP at night.  He is on 40% FiO2 when he is off BiPAP he is on nasal cannula oxygen.  His hemoglobin has been stable he is on aspirin and Brilinta and Eliquis.  He is on torsemide 40 mg increased to 60 mg by nephrology today.  Monitor urine output renal function creatinine is stable.      Discussed with nursing staff, treatment and plan discussed.      Total critical care time caring for this patient with life threatening, unstable organ failure, including direct patient contact, management of life support systems, review of data including imaging and labs, discussions with other team members and physicians at least 30  Min so far today, excluding procedures.       Please note that this chart was generated using voice recognition Dragon dictation software. Although every effort was made to ensure the accuracy of this automated transcription, some errors in transcription may have occurred.     Juan Luis Zelaya MD  3/8/2024 10:49 AM

## 2024-03-08 NOTE — PLAN OF CARE
Problem: Respiratory - Adult  Goal: Achieves optimal ventilation and oxygenation  3/7/2024 1954 by Elisabeth Dover RCP  Flowsheets (Taken 3/7/2024 1954)  Achieves optimal ventilation and oxygenation:   Assess for changes in respiratory status   Position to facilitate oxygenation and minimize respiratory effort   Initiate smoking cessation protocol as indicated   Assess the need for suctioning and aspirate as needed   Respiratory therapy support as indicated   Assess for changes in mentation and behavior   Oxygen supplementation based on oxygen saturation or arterial blood gases   Encourage broncho-pulmonary hygiene including cough, deep breathe, incentive spirometry   Assess and instruct to report shortness of breath or any respiratory difficulty

## 2024-03-08 NOTE — PLAN OF CARE
Problem: Chronic Conditions and Co-morbidities  Goal: Patient's chronic conditions and co-morbidity symptoms are monitored and maintained or improved  Outcome: Progressing     Problem: Safety - Adult  Goal: Free from fall injury  Outcome: Progressing     Problem: Discharge Planning  Goal: Discharge to home or other facility with appropriate resources  Outcome: Progressing     Problem: Respiratory - Adult  Goal: Achieves optimal ventilation and oxygenation  Outcome: Progressing  Flowsheets (Taken 3/8/2024 0800)  Achieves optimal ventilation and oxygenation: Assess for changes in respiratory status     Problem: Pain  Goal: Verbalizes/displays adequate comfort level or baseline comfort level  Outcome: Progressing     Problem: Skin/Tissue Integrity  Goal: Absence of new skin breakdown  Description: 1.  Monitor for areas of redness and/or skin breakdown  2.  Assess vascular access sites hourly  3.  Every 4-6 hours minimum:  Change oxygen saturation probe site  4.  Every 4-6 hours:  If on nasal continuous positive airway pressure, respiratory therapy assess nares and determine need for appliance change or resting period.  Outcome: Progressing     Problem: ABCDS Injury Assessment  Goal: Absence of physical injury  Outcome: Progressing     Problem: Nutrition Deficit:  Goal: Optimize nutritional status  Outcome: Progressing

## 2024-03-08 NOTE — PROGRESS NOTES
[facility]    Progress               Today's Date: 3/8/2024  Patient Name: Kb Wilson  Date of admission: 2/29/2024  3:40 PM  Patient's age: 78 y.o., 1945  Admission Dx: Cardiac arrest (HCC) [I46.9]    Subjective:  Patient seen and examined at bedside.  Used BIPAP overnight alternating with nasal cannula 4L.  Currently normal sinus rhythm, remains off pressure support.   Denies any chest pain, palpitations overnight.   He underwent cardiac cath yesterday, Successful PCI/shockwave/DAYTON to left main extending into proximal and mid LCx   Left groin site is good without any swelling.     Past Medical History:   has a past medical history of CAD (coronary artery disease), CHF (congestive heart failure) ([facility]), Diabetes mellitus ([facility]), and Hypertension.    Past Surgical History:   has a past surgical history that includes Cardiac defibrillator placement; Coronary artery bypass graft; Upper gastrointestinal endoscopy (N/A, 2/28/2024); Colonoscopy (N/A, 2/29/2024); and Cardiac procedure (N/A, 3/4/2024).     Home Medications:    Prior to Admission medications    Medication Sig Start Date End Date Taking? Authorizing Provider   carvedilol (COREG) 3.125 MG tablet Take 1 tablet by mouth 2 times daily (with meals) 2/25/24   Marlena Hedrick MD   aspirin 81 MG chewable tablet Take 1 tablet by mouth daily  Patient not taking: Reported on 2/27/2024 2/26/24   Marlena Hedrick MD   atorvastatin (LIPITOR) 80 MG tablet Take 1 tablet by mouth nightly 2/25/24   Marlena Hedrick MD   calcitRIOL (ROCALTROL) 0.25 MCG capsule Take 1 capsule by mouth as needed Three times weekly 7/8/23   Laura More MD   apixaban (ELIQUIS) 5 MG TABS tablet Take 1 tablet by mouth 2 times daily 7/8/23   Laura More MD   famotidine (PEPCID) 40 MG tablet Take 1 tablet by mouth daily 7/8/23   Laura More MD   OZEMPIC, 0.25 OR 0.5 MG/DOSE, 2 MG/3ML SOPN  1/3/24   Laura More MD   insulin glargine  respiratory failure (improving)  Anemia (stable)    RECOMMENDATIONS:  Continue dual antiplatelet therapy for next 6 months with aspirin and brillinta. Followed by aspirin only.   Continue eliquis for Afib.   Continue atorvastatin 80 mg nightly. Resume coreg 3.12mg BID as tolerated by the blood pressure.  Continue torsemide, resume cozaar when okay with nephrology.   Continue rest of management as per critical care team.  Further recommendations to follow after rounding with the attending.     Karen Marie MD   Internal Medicine Resident, PGY-3  Cardiology service  [facility]; [place], [place]  3/8/2024, 7:05 AM    Attending Physician Statement  I have discussed the care of the patient, including pertinent history and exam findings, with the resident. I have seen and examined the patient and the key elements of all parts of the encounter have been performed by me. I agree with the assessment, plan and orders as documented by the resident.  Patient is alert PCI details as above  At present patient is on Brilinta aspirin and Eliquis  Aspirin should be continued for a month and then discontinue  CHF we will continue to compensate  COPD on BiPAP pulmonary on board  Cardiology will be seeing from distance please call if needed    Anoop Bloom MD

## 2024-03-08 NOTE — PROGRESS NOTES
2000 mg in 50 mL IVPB premix, PRN  ondansetron (ZOFRAN-ODT) disintegrating tablet 4 mg, Q8H PRN   Or  ondansetron (ZOFRAN) injection 4 mg, Q6H PRN  polyethylene glycol (GLYCOLAX) packet 17 g, Daily PRN  acetaminophen (TYLENOL) tablet 650 mg, Q6H PRN   Or  acetaminophen (TYLENOL) suppository 650 mg, Q6H PRN  pantoprazole (PROTONIX) 40 mg in sodium chloride (PF) 0.9 % 10 mL injection, BID          LABS      CBC:   Recent Labs     03/06/24  0646 03/07/24  0521 03/08/24  0402   WBC 12.9* 10.6 7.2   RBC 2.83* 2.90* 2.76*   HGB 8.2* 8.4* 7.8*   HCT 27.5* 27.9* 28.5*   MCV 97.2 96.2 103.3*   MCH 29.0 29.0 28.3   MCHC 29.8 30.1 27.4*   RDW 20.3* 20.3* 20.3*    279 274   MPV 10.2 10.4 10.7      BMP:   Recent Labs     03/06/24  0646 03/07/24  0521 03/08/24  0402    142 138   K 5.2 4.8 5.3   * 107 105   CO2 26 25 24   BUN 48* 49* 44*   CREATININE 1.7* 1.6* 1.6*   GLUCOSE 202* 233* 273*   CALCIUM 7.9* 7.8* 7.9*      BNP:No results found for: \"BNP\"  PHOSPHORUS:  No results for input(s): \"PHOS\" in the last 72 hours.  MAGNESIUM:   Recent Labs     03/06/24  0646   MG 2.6*     ALBUMIN: No results for input(s): \"LABALBU\" in the last 72 hours.  IRON:    Lab Results   Component Value Date/Time    IRON 47 02/28/2024 03:45 AM     IRON SATURATION:  No components found for: \"LABIRON\"  TIBC:    Lab Results   Component Value Date/Time    TIBC 183 02/28/2024 03:45 AM     FERRITIN:  No results found for: \"FERRITIN\"  ANCA:   Lab Results   Component Value Date    ANCA NEGATIVE 03/05/2024       SPEP:   Lab Results   Component Value Date/Time    PROT 5.1 03/05/2024 05:47 AM     UPEP: No results found for: \"TPU\"   HEPBSAG:No results found for: \"HEPBSAG\"  HEPCAB:No results found for: \"HEPCAB\"  C3:   Lab Results   Component Value Date    C3 112 03/05/2024     C4:   Lab Results   Component Value Date    C4 20 03/05/2024     MPO ANCA:   Lab Results   Component Value Date/Time    MPO <0.3 03/05/2024 05:47 AM    .  PR3 ANCA:    Lab  the visit, no guarantees can be provided that every mistake has been identified and corrected by editing    Electronically signed by Kaleb Adams MD on 3/8/2024 at 8:58 AM

## 2024-03-08 NOTE — PROGRESS NOTES
Verbal order obtained to remove right femoral CVC. Writer explained the procedure and reasoning to the Pt, Pt agreeable. Sutures removed.    2034 - CVC removed, manual pressure held x5 minutes.  2039 - Slight oozing noted, manual pressure held additional 2 minutes.  2041 - No oozing, pressure dressing placed.    Pt tolerated procedure well.

## 2024-03-08 NOTE — CARE COORDINATION
Transitional Planning:   Cath with stent placement 3/7, 3L NC/ Bipap, Home with [facility]- current/verrified, has walker and Home O2- will need travel home O2 tank for transport home. PT/OT ordered

## 2024-03-09 ENCOUNTER — APPOINTMENT (OUTPATIENT)
Dept: GENERAL RADIOLOGY | Age: 79
DRG: 981 | End: 2024-03-09
Attending: INTERNAL MEDICINE
Payer: COMMERCIAL

## 2024-03-09 LAB
ANION GAP SERPL CALCULATED.3IONS-SCNC: 8 MMOL/L (ref 9–16)
BASOPHILS # BLD: <0.03 K/UL (ref 0–0.2)
BASOPHILS NFR BLD: 0 % (ref 0–2)
BUN SERPL-MCNC: 44 MG/DL (ref 8–23)
CALCIUM SERPL-MCNC: 7.8 MG/DL (ref 8.6–10.4)
CHLORIDE SERPL-SCNC: 102 MMOL/L (ref 98–107)
CO2 SERPL-SCNC: 29 MMOL/L (ref 20–31)
CREAT SERPL-MCNC: 1.7 MG/DL (ref 0.7–1.2)
EOSINOPHIL # BLD: 0.08 K/UL (ref 0–0.44)
EOSINOPHILS RELATIVE PERCENT: 1 % (ref 1–4)
ERYTHROCYTE [DISTWIDTH] IN BLOOD BY AUTOMATED COUNT: 20 % (ref 11.8–14.4)
GFR SERPL CREATININE-BSD FRML MDRD: 41 ML/MIN/1.73M2
GLUCOSE BLD-MCNC: 191 MG/DL (ref 75–110)
GLUCOSE BLD-MCNC: 193 MG/DL (ref 75–110)
GLUCOSE BLD-MCNC: 205 MG/DL (ref 75–110)
GLUCOSE BLD-MCNC: 265 MG/DL (ref 75–110)
GLUCOSE SERPL-MCNC: 166 MG/DL (ref 74–99)
HCT VFR BLD AUTO: 26.8 % (ref 40.7–50.3)
HGB BLD-MCNC: 8 G/DL (ref 13–17)
IMM GRANULOCYTES # BLD AUTO: 0.06 K/UL (ref 0–0.3)
IMM GRANULOCYTES NFR BLD: 1 %
LYMPHOCYTES NFR BLD: 1.17 K/UL (ref 1.1–3.7)
LYMPHOCYTES RELATIVE PERCENT: 14 % (ref 24–43)
MCH RBC QN AUTO: 28.6 PG (ref 25.2–33.5)
MCHC RBC AUTO-ENTMCNC: 29.9 G/DL (ref 28.4–34.8)
MCV RBC AUTO: 95.7 FL (ref 82.6–102.9)
MONOCYTES NFR BLD: 0.66 K/UL (ref 0.1–1.2)
MONOCYTES NFR BLD: 8 % (ref 3–12)
NEUTROPHILS NFR BLD: 77 % (ref 36–65)
NEUTS SEG NFR BLD: 6.5 K/UL (ref 1.5–8.1)
NRBC BLD-RTO: 0.6 PER 100 WBC
PLATELET # BLD AUTO: 309 K/UL (ref 138–453)
PMV BLD AUTO: 10.6 FL (ref 8.1–13.5)
POTASSIUM SERPL-SCNC: 4.1 MMOL/L (ref 3.7–5.3)
RBC # BLD AUTO: 2.8 M/UL (ref 4.21–5.77)
RBC # BLD: ABNORMAL 10*6/UL
SODIUM SERPL-SCNC: 139 MMOL/L (ref 136–145)
WBC OTHER # BLD: 8.5 K/UL (ref 3.5–11.3)

## 2024-03-09 PROCEDURE — 97166 OT EVAL MOD COMPLEX 45 MIN: CPT

## 2024-03-09 PROCEDURE — 99291 CRITICAL CARE FIRST HOUR: CPT | Performed by: INTERNAL MEDICINE

## 2024-03-09 PROCEDURE — 6370000000 HC RX 637 (ALT 250 FOR IP): Performed by: STUDENT IN AN ORGANIZED HEALTH CARE EDUCATION/TRAINING PROGRAM

## 2024-03-09 PROCEDURE — 2580000003 HC RX 258: Performed by: STUDENT IN AN ORGANIZED HEALTH CARE EDUCATION/TRAINING PROGRAM

## 2024-03-09 PROCEDURE — 2580000003 HC RX 258

## 2024-03-09 PROCEDURE — 82947 ASSAY GLUCOSE BLOOD QUANT: CPT

## 2024-03-09 PROCEDURE — 6360000002 HC RX W HCPCS

## 2024-03-09 PROCEDURE — 94660 CPAP INITIATION&MGMT: CPT

## 2024-03-09 PROCEDURE — 6370000000 HC RX 637 (ALT 250 FOR IP): Performed by: INTERNAL MEDICINE

## 2024-03-09 PROCEDURE — 2700000000 HC OXYGEN THERAPY PER DAY

## 2024-03-09 PROCEDURE — C9113 INJ PANTOPRAZOLE SODIUM, VIA: HCPCS

## 2024-03-09 PROCEDURE — 36415 COLL VENOUS BLD VENIPUNCTURE: CPT

## 2024-03-09 PROCEDURE — 71045 X-RAY EXAM CHEST 1 VIEW: CPT

## 2024-03-09 PROCEDURE — 97162 PT EVAL MOD COMPLEX 30 MIN: CPT

## 2024-03-09 PROCEDURE — 97535 SELF CARE MNGMENT TRAINING: CPT

## 2024-03-09 PROCEDURE — 6370000000 HC RX 637 (ALT 250 FOR IP)

## 2024-03-09 PROCEDURE — 85025 COMPLETE CBC W/AUTO DIFF WBC: CPT

## 2024-03-09 PROCEDURE — A4216 STERILE WATER/SALINE, 10 ML: HCPCS

## 2024-03-09 PROCEDURE — 97530 THERAPEUTIC ACTIVITIES: CPT

## 2024-03-09 PROCEDURE — 2000000000 HC ICU R&B

## 2024-03-09 PROCEDURE — 94761 N-INVAS EAR/PLS OXIMETRY MLT: CPT

## 2024-03-09 PROCEDURE — 80048 BASIC METABOLIC PNL TOTAL CA: CPT

## 2024-03-09 RX ADMIN — SODIUM CHLORIDE, PRESERVATIVE FREE 10 ML: 5 INJECTION INTRAVENOUS at 19:47

## 2024-03-09 RX ADMIN — PANTOPRAZOLE SODIUM 40 MG: 40 INJECTION, POWDER, FOR SOLUTION INTRAVENOUS at 21:37

## 2024-03-09 RX ADMIN — PANTOPRAZOLE SODIUM 40 MG: 40 INJECTION, POWDER, FOR SOLUTION INTRAVENOUS at 08:23

## 2024-03-09 RX ADMIN — ATORVASTATIN CALCIUM 80 MG: 80 TABLET, FILM COATED ORAL at 21:36

## 2024-03-09 RX ADMIN — APIXABAN 5 MG: 5 TABLET, FILM COATED ORAL at 08:23

## 2024-03-09 RX ADMIN — ACETAMINOPHEN 650 MG: 325 TABLET ORAL at 12:42

## 2024-03-09 RX ADMIN — MIDODRINE HYDROCHLORIDE 10 MG: 5 TABLET ORAL at 17:01

## 2024-03-09 RX ADMIN — TICAGRELOR 90 MG: 90 TABLET ORAL at 08:25

## 2024-03-09 RX ADMIN — INSULIN GLARGINE 30 UNITS: 100 INJECTION, SOLUTION SUBCUTANEOUS at 21:37

## 2024-03-09 RX ADMIN — TORSEMIDE 40 MG: 20 TABLET ORAL at 08:23

## 2024-03-09 RX ADMIN — APIXABAN 5 MG: 5 TABLET, FILM COATED ORAL at 21:36

## 2024-03-09 RX ADMIN — TICAGRELOR 90 MG: 90 TABLET ORAL at 21:36

## 2024-03-09 RX ADMIN — ASPIRIN 81 MG 81 MG: 81 TABLET ORAL at 08:23

## 2024-03-09 RX ADMIN — METOPROLOL TARTRATE 12.5 MG: 25 TABLET, FILM COATED ORAL at 21:36

## 2024-03-09 RX ADMIN — INSULIN LISPRO 4 UNITS: 100 INJECTION, SOLUTION INTRAVENOUS; SUBCUTANEOUS at 08:23

## 2024-03-09 RX ADMIN — SODIUM CHLORIDE, PRESERVATIVE FREE 10 ML: 5 INJECTION INTRAVENOUS at 08:25

## 2024-03-09 RX ADMIN — INSULIN LISPRO 8 UNITS: 100 INJECTION, SOLUTION INTRAVENOUS; SUBCUTANEOUS at 12:43

## 2024-03-09 ASSESSMENT — PAIN DESCRIPTION - PAIN TYPE: TYPE: ACUTE PAIN

## 2024-03-09 ASSESSMENT — PAIN DESCRIPTION - ONSET: ONSET: ON-GOING

## 2024-03-09 ASSESSMENT — PAIN DESCRIPTION - ORIENTATION
ORIENTATION: RIGHT;MID
ORIENTATION: ANTERIOR
ORIENTATION: RIGHT;MID
ORIENTATION: RIGHT

## 2024-03-09 ASSESSMENT — PAIN SCALES - GENERAL
PAINLEVEL_OUTOF10: 5
PAINLEVEL_OUTOF10: 0
PAINLEVEL_OUTOF10: 4
PAINLEVEL_OUTOF10: 5
PAINLEVEL_OUTOF10: 0

## 2024-03-09 ASSESSMENT — PAIN DESCRIPTION - LOCATION
LOCATION: CHEST;RIB CAGE
LOCATION: RIB CAGE
LOCATION: CHEST;RIB CAGE
LOCATION: RIB CAGE

## 2024-03-09 ASSESSMENT — PAIN DESCRIPTION - DESCRIPTORS: DESCRIPTORS: ACHING

## 2024-03-09 ASSESSMENT — PAIN - FUNCTIONAL ASSESSMENT: PAIN_FUNCTIONAL_ASSESSMENT: PREVENTS OR INTERFERES SOME ACTIVE ACTIVITIES AND ADLS

## 2024-03-09 NOTE — PROGRESS NOTES
Nephrology Progress Note      SUBJECTIVE    Patient was seen and examined at bedside.  He is still intermittently requiring BiPAP does admit to some shortness of breath.  Torsemide was increased yesterday.  He had 1.2 L urine output over the past 24 hours.  The chest x-ray from this morning shows slight improvement in pulmonary vascular congestion.  Bilateral effusions.  His creatinine is back at his baseline at 1.7.  Sodium 141, bicarb is 29.  His blood pressures are running in the low 100s today with heart rate has increased to the 1 teens.         OBJECTIVE      CURRENT TEMPERATURE:  Temp: 97.5 °F (36.4 °C)  MAXIMUM TEMPERATURE OVER 24HRS:  Temp (24hrs), Av.6 °F (36.4 °C), Min:97.4 °F (36.3 °C), Max:97.8 °F (36.6 °C)    CURRENT RESPIRATORY RATE:  Respirations: 25  CURRENT PULSE:  Pulse: (!) 118  CURRENT BLOOD PRESSURE:  BP: 110/73  24HR BLOOD PRESSURE RANGE:  Systolic (24hrs), Av , Min:103 , Max:142   ; Diastolic (24hrs), Av, Min:52, Max:78    24HR INTAKE/OUTPUT:    Intake/Output Summary (Last 24 hours) at 3/9/2024 1217  Last data filed at 3/9/2024 0825  Gross per 24 hour   Intake 349 ml   Output 950 ml   Net -601 ml       WEIGHT :Patient Vitals for the past 96 hrs (Last 3 readings):   Weight   24 0600 127.3 kg (280 lb 10.3 oz)   24 0600 120.5 kg (265 lb 10.5 oz)   24 0600 128.9 kg (284 lb 2.8 oz)       PHYSICAL EXAM      GENERAL APPEARANCE: Awake, alert, in no acute distress, on BiPAP  SKIN: warm and dry, no rash or erythema  EYES: conjunctivae normal and sclera anicteric  ENT: no thrush no pharyngeal congestion   NECK:   No JVD. No carotid bruits and no carotid lymphadenopathy .  PULMONARY: Bilateral air entry with fair air movement a few faint bibasilar crackles noted, on BiPAP.   CARDIOVASCULAR: Tachycardic rate, regular rhythm, positive S1 and S2 and a positive systolic murmur  ABDOMEN: Obese, soft nontender, bowel sounds present,  no ascites.       EXTREMITIES: Mild pedal  attending physician  [facility] marcia Mora  3/9/2024

## 2024-03-09 NOTE — PROGRESS NOTES
INTENSIVE CARE UNIT  Resident Physician Progress Note    Patient - Kb Wilson  Date of Admission -  2/29/2024  3:40 PM  Date of Evaluation -  3/9/2024  Room and Bed Number -  3003/3003-01   Hospital Day - 9    SUBJECTIVE:     HISTORY OF PRESENT ILLNESS:    Kb Wilson is a 78 y.o. with a history of:  CAD status post CABG on Plavix  CHF with ejection fraction 20% has AICD  Paroxysmal A-fib on Eliquis  Diabetes mellitus  Hypertension  Stage III kidney disease     Patient presented to [place] emergency department on 2/27/2024 for bright red blood when he wiped when going to the bathroom just prior to presenting to the emergency room.  Concern for GI bleed.  CT abdomen pelvis without contrast suggest diverticulosis.  Patient was admitted for GI consult was started on Protonix infusion.  Patient had EGD that was normal.  Patient was prepped for colonoscopy today.  Patient was given propofol and the colonoscopy was about to begin when he cardiac arrested.  Cardiac arrested a total of 3 times in the OR.  He was intubated.  He was then transferred to the ICU where he coded 2 more times.  He had a CODE BLUE total of 5 times [facility].  He was transferred to [facility] for cardiac evaluation.     Patient was able to have some of the colonoscopy done that did not show any active bleeding however does show diverticula consistent with diverticulosis.  Recommendations from GI doctor at [place] patient's hemoglobin drops again to get a CTA abdomen pelvis and transfuse.  Keep hemoglobin above 7.  Patient is on a Protonix drip.     Initial hemoglobin 8.5, white blood cell count 20.8, chest x-ray shows pulmonary vascular congestion     Interval hx:  3/1/2024: Extubate, consult GI, wean down pressors.  3/2/2024: Started on clear liquid diet  3/3/2024: NPO at midnight for cath on 3/4  3/4- cath showed only vessel patent is LIMA, planning for staged PCI  3/07: Underwent DAYTON to L main extending into  Sensitivity 499 (HH) 0 - 22 ng/L   APTT   Result Value Ref Range    APTT 156.7 (HH) 23.0 - 36.5 sec   Hemoglobin A1C   Result Value Ref Range    Hemoglobin A1C 7.6 (H) 4.0 - 6.0 %    Estimated Avg Glucose 171 mg/dL   Troponin   Result Value Ref Range    Troponin, High Sensitivity 554 (HH) 0 - 22 ng/L   Hemoglobin and Hematocrit   Result Value Ref Range    Hemoglobin 7.7 (L) 13.0 - 17.0 g/dL    Hematocrit 26.8 (L) 40.7 - 50.3 %   Troponin   Result Value Ref Range    Troponin, High Sensitivity 672 (HH) 0 - 22 ng/L   Hemoglobin and Hematocrit   Result Value Ref Range    Hemoglobin 8.4 (L) 13.0 - 17.0 g/dL    Hematocrit 26.7 (L) 40.7 - 50.3 %   Hemoglobin and Hematocrit   Result Value Ref Range    Hemoglobin 8.3 (L) 13.0 - 17.0 g/dL    Hematocrit 27.8 (L) 40.7 - 50.3 %   Anti-Xa, Unfractionated Heparin   Result Value Ref Range    Anti-XA Unfrac Heparin 0.51 IU/L   Basic Metabolic Panel w/ Reflex to MG   Result Value Ref Range    Sodium 141 136 - 145 mmol/L    Potassium 5.2 3.7 - 5.3 mmol/L    Chloride 108 (H) 98 - 107 mmol/L    CO2 26 20 - 31 mmol/L    Anion Gap 7 (L) 9 - 16 mmol/L    Glucose 202 (H) 74 - 99 mg/dL    BUN 48 (H) 8 - 23 mg/dL    Creatinine 1.7 (H) 0.70 - 1.20 mg/dL    Est, Glom Filt Rate 40 (L) >60 mL/min/1.73m2    Calcium 7.9 (L) 8.6 - 10.4 mg/dL   CBC with Auto Differential   Result Value Ref Range    WBC 12.9 (H) 3.5 - 11.3 k/uL    RBC 2.83 (L) 4.21 - 5.77 m/uL    Hemoglobin 8.2 (L) 13.0 - 17.0 g/dL    Hematocrit 27.5 (L) 40.7 - 50.3 %    MCV 97.2 82.6 - 102.9 fL    MCH 29.0 25.2 - 33.5 pg    MCHC 29.8 28.4 - 34.8 g/dL    RDW 20.3 (H) 11.8 - 14.4 %    Platelets 260 138 - 453 k/uL    MPV 10.2 8.1 - 13.5 fL    NRBC Automated 0.7 (H) 0.0 per 100 WBC    Immature Granulocytes 1 (H) 0 %    Neutrophils % 75 (H) 36 - 66 %    Lymphocytes % 17 (L) 24 - 44 %    Monocytes % 7 1 - 7 %    Eosinophils % 0 (L) 1 - 4 %    Basophils % 0 0 - 2 %    nRBC 1 (H) 0 per 100 WBC    Absolute Immature Granulocyte 0.13 0.00 -  assessment and plan and status of the problem list as documented.    Saw the patient in the ICU overnight events noted and chart reviewed  He is tachycardic with heart rate of 116 but regular.  Will start him on low-dose beta-blocker he is on aspirin and statin and Brilinta.  Torsemide presented to pulmonary congestion torsemide is 40 mg once daily his urine output is 1.2 L.  Patient remained on nasal cannula alternating with BiPAP most of the time he is on BiPAP 40% does not desaturate with hospital BiPAP.    Discussed with nursing staff, treatment and plan discussed.  Discussed with respiratory therapist.    Total critical care time caring for this patient with life threatening, unstable organ failure, including direct patient contact, management of life support systems, review of data including imaging and labs, discussions with other team members and physicians at least 30  Min so far today, excluding procedures.       Please note that this chart was generated using voice recognition Dragon dictation software. Although every effort was made to ensure the accuracy of this automated transcription, some errors in transcription may have occurred.     Juan Luis Zelaya MD  3/9/2024 3:06 PM

## 2024-03-09 NOTE — PLAN OF CARE
Problem: Chronic Conditions and Co-morbidities  Goal: Patient's chronic conditions and co-morbidity symptoms are monitored and maintained or improved  Outcome: Progressing     Problem: Safety - Adult  Goal: Free from fall injury  Outcome: Progressing  Flowsheets (Taken 3/9/2024 0800)  Free From Fall Injury: Instruct family/caregiver on patient safety     Problem: Discharge Planning  Goal: Discharge to home or other facility with appropriate resources  Outcome: Progressing     Problem: Respiratory - Adult  Goal: Achieves optimal ventilation and oxygenation  Outcome: Progressing     Problem: Pain  Goal: Verbalizes/displays adequate comfort level or baseline comfort level  Outcome: Progressing     Problem: Skin/Tissue Integrity  Goal: Absence of new skin breakdown  Description: 1.  Monitor for areas of redness and/or skin breakdown  2.  Assess vascular access sites hourly  3.  Every 4-6 hours minimum:  Change oxygen saturation probe site  4.  Every 4-6 hours:  If on nasal continuous positive airway pressure, respiratory therapy assess nares and determine need for appliance change or resting period.  Outcome: Progressing     Problem: ABCDS Injury Assessment  Goal: Absence of physical injury  Outcome: Progressing  Flowsheets (Taken 3/9/2024 0800)  Absence of Physical Injury: Implement safety measures based on patient assessment     Problem: Nutrition Deficit:  Goal: Optimize nutritional status  Outcome: Progressing

## 2024-03-09 NOTE — PLAN OF CARE
Problem: Chronic Conditions and Co-morbidities  Goal: Patient's chronic conditions and co-morbidity symptoms are monitored and maintained or improved  3/8/2024 2109 by Radha Alston, RN  Outcome: Progressing  Flowsheets (Taken 3/8/2024 2000)  Care Plan - Patient's Chronic Conditions and Co-Morbidity Symptoms are Monitored and Maintained or Improved: Monitor and assess patient's chronic conditions and comorbid symptoms for stability, deterioration, or improvement     Problem: Safety - Adult  Goal: Free from fall injury  Recent Flowsheet Documentation  Taken 3/8/2024 2100 by Radha Alston, RN  Free From Fall Injury: Instruct family/caregiver on patient safety     Problem: Discharge Planning  Goal: Discharge to home or other facility with appropriate resources  3/8/2024 2109 by Radha Alston, RN  Outcome: Progressing  Flowsheets (Taken 3/8/2024 2000)  Discharge to home or other facility with appropriate resources: Identify barriers to discharge with patient and caregiver     Problem: Respiratory - Adult  Goal: Achieves optimal ventilation and oxygenation  3/8/2024 2109 by Radha Alston, RN  Outcome: Progressing  Flowsheets (Taken 3/8/2024 2000)  Achieves optimal ventilation and oxygenation: Assess for changes in respiratory status     Problem: Pain  Goal: Verbalizes/displays adequate comfort level or baseline comfort level  3/8/2024 2109 by Radha Alston RN  Outcome: Progressing     Problem: Skin/Tissue Integrity  Goal: Absence of new skin breakdown  Description: 1.  Monitor for areas of redness and/or skin breakdown  2.  Assess vascular access sites hourly  3.  Every 4-6 hours minimum:  Change oxygen saturation probe site  4.  Every 4-6 hours:  If on nasal continuous positive airway pressure, respiratory therapy assess nares and determine need for appliance change or resting period.  3/8/2024 2109 by Radha Alston, RN  Outcome: Progressing     Problem: ABCDS Injury Assessment  Goal: Absence  of physical injury  Recent Flowsheet Documentation  Taken 3/8/2024 2100 by Radha Alston, RN  Absence of Physical Injury: Implement safety measures based on patient assessment     Problem: Nutrition Deficit:  Goal: Optimize nutritional status  3/8/2024 2109 by Radha Alston, RN  Outcome: Progressing

## 2024-03-09 NOTE — PROGRESS NOTES
Physical Therapy  Facility/Department: [facility] CAR 3- MICU  Physical Therapy Initial Assessment    Name: Kb Wilson  : 1945  MRN: [id number]  Date of Service: 3/9/2024    Discharge Recommendations:  Patient would benefit from continued therapy after discharge   PT Equipment Recommendations  Equipment Needed: No (CTA)      Patient Diagnosis(es): The primary encounter diagnosis was Cardiac arrest (HCC). Diagnoses of ACS (acute coronary syndrome) (HCC), CAD in native artery, and S/P primary angioplasty with coronary stent were also pertinent to this visit.  Past Medical History:  has a past medical history of CAD (coronary artery disease), CHF (congestive heart failure) (HCC), Diabetes mellitus (HCC), and Hypertension.  Past Surgical History:  has a past surgical history that includes Cardiac defibrillator placement; Coronary artery bypass graft; Upper gastrointestinal endoscopy (N/A, 2024); Colonoscopy (N/A, 2024); Cardiac procedure (N/A, 3/4/2024); and Cardiac procedure (N/A, 3/7/2024).    Assessment   Body Structures, Functions, Activity Limitations Requiring Skilled Therapeutic Intervention: Decreased functional mobility ;Decreased endurance;Decreased balance;Decreased strength  Assessment: Pt with mobility deficits requiring mod-A to perform bed mobility, CGA to ambulate 2 feet with a RW.  Pt is grossly deconditioned, demonstrates significant endurance and BLE strength deficits.  Pt fatigues quickly in standing with SpO2 remaining >92% but respiration rate increasing to 40 breathes/min briefly.  Pt is motivated and would benefit from additional PT upon discharge to assist in return to independent PLOF.  Pt would be unsafe to return to prior living arrangements upon discharge due to inability to negotiate home environment.  Therapy Prognosis: Good  Decision Making: Medium Complexity  Requires PT Follow-Up: Yes  Activity Tolerance  Activity Tolerance: Patient tolerated treatment well     Plan    Physical Therapy Plan  General Plan:  (5-6x/week)  Current Treatment Recommendations: Strengthening, Balance training, Functional mobility training, Transfer training, Gait training, Safety education & training, Home exercise program, Therapeutic activities, Patient/Caregiver education & training, Equipment evaluation, education, & procurement, Endurance training  Safety Devices  Type of Devices: Call light within reach, Left in bed, Gait belt, Nurse notified  Restraints  Restraints Initially in Place: No     Restrictions  Restrictions/Precautions  Restrictions/Precautions: Fall Risk  Required Braces or Orthoses?: No  Position Activity Restriction  Other position/activity restrictions: activity as tolerated, extubated 3/1     Subjective   General  Patient assessed for rehabilitation services?: Yes  Response To Previous Treatment: Not applicable  Family / Caregiver Present: No  Follows Commands: Within Functional Limits  Subjective  Subjective: Pt supine in bed and agreeable to therapy, RN agreeable to therapy.  Pt very pleasant and cooperative throughout.  Pt reports 5/10 chest pain at rest.         Social/Functional History  Social/Functional History  Lives With: Alone  Type of Home: [facility]  Home Layout: One level  Home Access: Level entry  Bathroom Shower/Tub: Walk-in shower  Bathroom Toilet: Standard  Bathroom Equipment: Grab bars in shower, Shower chair (suction grab bar)  Home Equipment: Walker, rolling (pt reports using RW sometimes for community mobility.)  ADL Assistance: Independent  Homemaking Assistance: Independent  Homemaking Responsibilities: Yes (click list for grocery shopping, able to perform all cooking, cleaning.)  Ambulation Assistance: Independent  Transfer Assistance: Independent  Active : Yes  Mode of Transportation: Car  Occupation: Retired  Type of Occupation: installing [employer]  Leisure & Hobbies: Reading, traveling  Additional Comments: Pt reports his son lives  your back to your side while in a flat bed without using bedrails?: A Little  How much help is needed moving from lying on your back to sitting on the side of a flat bed without using bedrails?: A Lot  How much help is needed moving to and from a bed to a chair?: A Lot  How much help is needed standing up from a chair using your arms?: A Little  How much help is needed walking in hospital room?: A Lot  How much help is needed climbing 3-5 steps with a railing?: Total  AM-PAC Inpatient Mobility Raw Score : 13  AM-PAC Inpatient T-Scale Score : 36.74  Mobility Inpatient CMS 0-100% Score: 64.91  Mobility Inpatient CMS G-Code Modifier : CL           Goals  Short Term Goals  Time Frame for Short Term Goals: 14 visits  Short Term Goal 1: Pt will perform sit<>stand transfer with supervision.  Short Term Goal 2: Pt will ambulate 80 feet with least restrictive AD and SBA.  Short Term Goal 3: Pt will demonstrate fair+ dynamic standing balance to decrease fall risk.  Short Term Goal 4: Pt will tolerate a 35 minute therapy session to promote increased endurance.  Short Term Goal 5: Pt will perform supine<>sit transfer with SBA       Education  Patient Education  Education Given To: Patient  Education Provided: Role of Therapy;Plan of Care;Transfer Training;Fall Prevention Strategies  Education Method: Verbal  Barriers to Learning: None  Education Outcome: Verbalized understanding      Therapy Time   Individual Concurrent Group Co-treatment   Time In 1115         Time Out 1146         Minutes 31         Timed Code Treatment Minutes: 8 Minutes       Steve Garcia PT

## 2024-03-09 NOTE — PROGRESS NOTES
Occupational Therapy  Facility/Department: [facility] CAR 3- MICU  Occupational Therapy Initial Assessment    Name: Kb Wilson  : 1945  MRN: [id number]  Date of Service: 3/9/2024      Chief Complaint   Patient presents with    Rectal Bleeding       Pt called EMS for c/o bleeding from rectum @ 1 hour PTA.  Noted bright red blood on linen when pt moved from EMS into bathroom. Pt is A/O and non distressful.          Discharge Recommendations:  Patient would benefit from continued therapy after discharge  OT Equipment Recommendations  Equipment Needed: Yes  Mobility Devices: ADL Assistive Devices  ADL Assistive Devices: Reacher;Sock-Aid Hard;Hand-held Shower;Long-handled Sponge       Patient Diagnosis(es): The primary encounter diagnosis was Cardiac arrest ([facility]). Diagnoses of ACS (acute coronary syndrome) ([facility]), CAD in native artery, and S/P primary angioplasty with coronary stent were also pertinent to this visit.  Past Medical History:  has a past medical history of CAD (coronary artery disease), CHF (congestive heart failure) ([facility]), Diabetes mellitus ([facility]), and Hypertension.  Past Surgical History:  has a past surgical history that includes Cardiac defibrillator placement; Coronary artery bypass graft; Upper gastrointestinal endoscopy (N/A, 2024); Colonoscopy (N/A, 2024); Cardiac procedure (N/A, 3/4/2024); and Cardiac procedure (N/A, 3/7/2024).           Assessment   Performance deficits / Impairments: Decreased functional mobility ;Decreased endurance;Decreased ADL status;Decreased strength;Decreased high-level IADLs;Decreased balance    Assessment: Pt supine upon arrival and agreeable to OT. Pt completes bed mobility MOD A, functional transfer MIN A 2WW and short distance during functional mobility CGA 2WW. Pt engaging in LB ADLs and toileting (please see ADL LOF). Pt limited by endurance overall however presents with increase motivation. Pt will benefit from cont OT to address deficits in ADL/IADL,  endurance/balance, functional transfers/mobility, and strength through skilled intervention for safe return to PLOF.    Prognosis: Good  Decision Making: Medium Complexity  REQUIRES OT FOLLOW-UP: Yes  Activity Tolerance  Activity Tolerance: Patient limited by fatigue        Plan   Occupational Therapy Plan  Times Per Week: 4-5x/wk  Current Treatment Recommendations: Strengthening, Balance training, Functional mobility training, Endurance training, Equipment evaluation, education, & procurement, Patient/Caregiver education & training, Safety education & training, Pain management, Self-Care / ADL, Home management training     Restrictions  Restrictions/Precautions  Restrictions/Precautions: Fall Risk  Required Braces or Orthoses?: No  Position Activity Restriction  Other position/activity restrictions: activity as tolerated, extubated 3/1    Subjective   General  Patient assessed for rehabilitation services?: Yes  Family / Caregiver Present: No  General Comment  Comments: RN ok'd for OT. Pt agreeable and reports chest pain secondary to pt receiving CPR multiple attempts throughout hospital stay. Pt does not numerically rate pain, writer implementing therapeutic use of self and repositioning for comfort.         Social/Functional History  Social/Functional History  Lives With: Alone  Type of Home: [facility] ([place])  Home Layout: One level  Home Access: Level entry  Bathroom Shower/Tub: Walk-in shower  Bathroom Toilet: Standard  Bathroom Equipment: Grab bars in shower, Shower chair (suction grab bar)  Home Equipment: Walker, rolling (pt reports using RW sometimes for community mobility.)  ADL Assistance: Independent  Homemaking Assistance: Independent  Homemaking Responsibilities: Yes (click list for grocery shopping, able to perform all cooking, cleaning.)  Ambulation Assistance: Independent  Transfer Assistance: Independent  Active : Yes  Mode of Transportation: Car  Occupation: Retired  Type of Occupation:

## 2024-03-10 LAB
ANION GAP SERPL CALCULATED.3IONS-SCNC: 10 MMOL/L (ref 9–16)
BASOPHILS # BLD: <0.03 K/UL (ref 0–0.2)
BASOPHILS NFR BLD: 0 % (ref 0–2)
BNP SERPL-MCNC: ABNORMAL PG/ML (ref 0–300)
BUN SERPL-MCNC: 38 MG/DL (ref 8–23)
CALCIUM SERPL-MCNC: 7.5 MG/DL (ref 8.6–10.4)
CHLORIDE SERPL-SCNC: 99 MMOL/L (ref 98–107)
CO2 SERPL-SCNC: 29 MMOL/L (ref 20–31)
CREAT SERPL-MCNC: 1.6 MG/DL (ref 0.7–1.2)
EOSINOPHIL # BLD: 0.13 K/UL (ref 0–0.44)
EOSINOPHILS RELATIVE PERCENT: 2 % (ref 1–4)
ERYTHROCYTE [DISTWIDTH] IN BLOOD BY AUTOMATED COUNT: 19.8 % (ref 11.8–14.4)
GFR SERPL CREATININE-BSD FRML MDRD: 43 ML/MIN/1.73M2
GLUCOSE BLD-MCNC: 169 MG/DL (ref 75–110)
GLUCOSE BLD-MCNC: 230 MG/DL (ref 75–110)
GLUCOSE BLD-MCNC: 236 MG/DL (ref 75–110)
GLUCOSE BLD-MCNC: 242 MG/DL (ref 75–110)
GLUCOSE SERPL-MCNC: 219 MG/DL (ref 74–99)
HCT VFR BLD AUTO: 26.4 % (ref 40.7–50.3)
HGB BLD-MCNC: 8.3 G/DL (ref 13–17)
IMM GRANULOCYTES # BLD AUTO: 0.06 K/UL (ref 0–0.3)
IMM GRANULOCYTES NFR BLD: 1 %
LYMPHOCYTES NFR BLD: 1.2 K/UL (ref 1.1–3.7)
LYMPHOCYTES RELATIVE PERCENT: 14 % (ref 24–43)
MCH RBC QN AUTO: 28.7 PG (ref 25.2–33.5)
MCHC RBC AUTO-ENTMCNC: 31.4 G/DL (ref 28.4–34.8)
MCV RBC AUTO: 91.3 FL (ref 82.6–102.9)
MONOCYTES NFR BLD: 0.65 K/UL (ref 0.1–1.2)
MONOCYTES NFR BLD: 8 % (ref 3–12)
NEUTROPHILS NFR BLD: 75 % (ref 36–65)
NEUTS SEG NFR BLD: 6.38 K/UL (ref 1.5–8.1)
NRBC BLD-RTO: 0.4 PER 100 WBC
PLATELET # BLD AUTO: 274 K/UL (ref 138–453)
PMV BLD AUTO: 11 FL (ref 8.1–13.5)
POTASSIUM SERPL-SCNC: 4.9 MMOL/L (ref 3.7–5.3)
RBC # BLD AUTO: 2.89 M/UL (ref 4.21–5.77)
RBC # BLD: ABNORMAL 10*6/UL
SODIUM SERPL-SCNC: 138 MMOL/L (ref 136–145)
WBC OTHER # BLD: 8.4 K/UL (ref 3.5–11.3)

## 2024-03-10 PROCEDURE — 2580000003 HC RX 258: Performed by: STUDENT IN AN ORGANIZED HEALTH CARE EDUCATION/TRAINING PROGRAM

## 2024-03-10 PROCEDURE — 6360000002 HC RX W HCPCS: Performed by: INTERNAL MEDICINE

## 2024-03-10 PROCEDURE — 6370000000 HC RX 637 (ALT 250 FOR IP)

## 2024-03-10 PROCEDURE — 82947 ASSAY GLUCOSE BLOOD QUANT: CPT

## 2024-03-10 PROCEDURE — 94761 N-INVAS EAR/PLS OXIMETRY MLT: CPT

## 2024-03-10 PROCEDURE — 6370000000 HC RX 637 (ALT 250 FOR IP): Performed by: STUDENT IN AN ORGANIZED HEALTH CARE EDUCATION/TRAINING PROGRAM

## 2024-03-10 PROCEDURE — 36415 COLL VENOUS BLD VENIPUNCTURE: CPT

## 2024-03-10 PROCEDURE — 2000000000 HC ICU R&B

## 2024-03-10 PROCEDURE — 94660 CPAP INITIATION&MGMT: CPT

## 2024-03-10 PROCEDURE — 6370000000 HC RX 637 (ALT 250 FOR IP): Performed by: INTERNAL MEDICINE

## 2024-03-10 PROCEDURE — 6360000002 HC RX W HCPCS

## 2024-03-10 PROCEDURE — 2580000003 HC RX 258

## 2024-03-10 PROCEDURE — 99291 CRITICAL CARE FIRST HOUR: CPT | Performed by: INTERNAL MEDICINE

## 2024-03-10 PROCEDURE — 85025 COMPLETE CBC W/AUTO DIFF WBC: CPT

## 2024-03-10 PROCEDURE — 83880 ASSAY OF NATRIURETIC PEPTIDE: CPT

## 2024-03-10 PROCEDURE — 80048 BASIC METABOLIC PNL TOTAL CA: CPT

## 2024-03-10 PROCEDURE — 2700000000 HC OXYGEN THERAPY PER DAY

## 2024-03-10 RX ORDER — FUROSEMIDE 10 MG/ML
20 INJECTION INTRAMUSCULAR; INTRAVENOUS EVERY 12 HOURS
Status: DISCONTINUED | OUTPATIENT
Start: 2024-03-10 | End: 2024-03-12

## 2024-03-10 RX ORDER — FUROSEMIDE 10 MG/ML
20 INJECTION INTRAMUSCULAR; INTRAVENOUS ONCE
Status: COMPLETED | OUTPATIENT
Start: 2024-03-10 | End: 2024-03-10

## 2024-03-10 RX ORDER — INSULIN GLARGINE 100 [IU]/ML
40 INJECTION, SOLUTION SUBCUTANEOUS NIGHTLY
Status: DISCONTINUED | OUTPATIENT
Start: 2024-03-10 | End: 2024-03-15 | Stop reason: HOSPADM

## 2024-03-10 RX ORDER — PANTOPRAZOLE SODIUM 40 MG/1
40 TABLET, DELAYED RELEASE ORAL
Status: DISCONTINUED | OUTPATIENT
Start: 2024-03-10 | End: 2024-03-15 | Stop reason: HOSPADM

## 2024-03-10 RX ORDER — FUROSEMIDE 10 MG/ML
40 INJECTION INTRAMUSCULAR; INTRAVENOUS EVERY 12 HOURS
Status: DISCONTINUED | OUTPATIENT
Start: 2024-03-10 | End: 2024-03-10

## 2024-03-10 RX ADMIN — INSULIN LISPRO 4 UNITS: 100 INJECTION, SOLUTION INTRAVENOUS; SUBCUTANEOUS at 12:03

## 2024-03-10 RX ADMIN — APIXABAN 5 MG: 5 TABLET, FILM COATED ORAL at 21:13

## 2024-03-10 RX ADMIN — FUROSEMIDE 20 MG: 10 INJECTION, SOLUTION INTRAMUSCULAR; INTRAVENOUS at 21:30

## 2024-03-10 RX ADMIN — PANTOPRAZOLE SODIUM 40 MG: 40 TABLET, DELAYED RELEASE ORAL at 16:50

## 2024-03-10 RX ADMIN — MIDODRINE HYDROCHLORIDE 10 MG: 5 TABLET ORAL at 12:10

## 2024-03-10 RX ADMIN — SODIUM CHLORIDE, PRESERVATIVE FREE 10 ML: 5 INJECTION INTRAVENOUS at 08:26

## 2024-03-10 RX ADMIN — INSULIN LISPRO 4 UNITS: 100 INJECTION, SOLUTION INTRAVENOUS; SUBCUTANEOUS at 08:18

## 2024-03-10 RX ADMIN — TICAGRELOR 90 MG: 90 TABLET ORAL at 08:24

## 2024-03-10 RX ADMIN — SODIUM CHLORIDE, PRESERVATIVE FREE 10 ML: 5 INJECTION INTRAVENOUS at 12:13

## 2024-03-10 RX ADMIN — INSULIN GLARGINE 40 UNITS: 100 INJECTION, SOLUTION SUBCUTANEOUS at 21:13

## 2024-03-10 RX ADMIN — FUROSEMIDE 20 MG: 10 INJECTION, SOLUTION INTRAMUSCULAR; INTRAVENOUS at 12:08

## 2024-03-10 RX ADMIN — PANTOPRAZOLE SODIUM 40 MG: 40 TABLET, DELAYED RELEASE ORAL at 08:23

## 2024-03-10 RX ADMIN — ASPIRIN 81 MG 81 MG: 81 TABLET ORAL at 08:24

## 2024-03-10 RX ADMIN — APIXABAN 5 MG: 5 TABLET, FILM COATED ORAL at 08:23

## 2024-03-10 RX ADMIN — TICAGRELOR 90 MG: 90 TABLET ORAL at 21:13

## 2024-03-10 RX ADMIN — METOPROLOL TARTRATE 12.5 MG: 25 TABLET, FILM COATED ORAL at 08:23

## 2024-03-10 RX ADMIN — METOPROLOL TARTRATE 12.5 MG: 25 TABLET, FILM COATED ORAL at 21:13

## 2024-03-10 RX ADMIN — TORSEMIDE 40 MG: 20 TABLET ORAL at 08:24

## 2024-03-10 RX ADMIN — SODIUM CHLORIDE, PRESERVATIVE FREE 10 ML: 5 INJECTION INTRAVENOUS at 21:28

## 2024-03-10 RX ADMIN — ATORVASTATIN CALCIUM 80 MG: 80 TABLET, FILM COATED ORAL at 21:13

## 2024-03-10 RX ADMIN — MIDODRINE HYDROCHLORIDE 10 MG: 5 TABLET ORAL at 08:24

## 2024-03-10 RX ADMIN — MIDODRINE HYDROCHLORIDE 10 MG: 5 TABLET ORAL at 16:50

## 2024-03-10 RX ADMIN — ACETAMINOPHEN 650 MG: 325 TABLET ORAL at 12:10

## 2024-03-10 ASSESSMENT — PAIN DESCRIPTION - PAIN TYPE
TYPE: ACUTE PAIN
TYPE: ACUTE PAIN

## 2024-03-10 ASSESSMENT — PAIN DESCRIPTION - LOCATION
LOCATION: CHEST
LOCATION: CHEST

## 2024-03-10 ASSESSMENT — PAIN SCALES - GENERAL
PAINLEVEL_OUTOF10: 4
PAINLEVEL_OUTOF10: 0
PAINLEVEL_OUTOF10: 6
PAINLEVEL_OUTOF10: 0
PAINLEVEL_OUTOF10: 3

## 2024-03-10 ASSESSMENT — PAIN DESCRIPTION - ONSET
ONSET: ON-GOING
ONSET: OTHER (COMMENT)

## 2024-03-10 ASSESSMENT — PAIN DESCRIPTION - ORIENTATION
ORIENTATION: RIGHT;LEFT;MID;ANTERIOR
ORIENTATION: ANTERIOR

## 2024-03-10 ASSESSMENT — PAIN DESCRIPTION - DESCRIPTORS
DESCRIPTORS: ACHING;SORE
DESCRIPTORS: SORE

## 2024-03-10 ASSESSMENT — PAIN - FUNCTIONAL ASSESSMENT
PAIN_FUNCTIONAL_ASSESSMENT: PREVENTS OR INTERFERES SOME ACTIVE ACTIVITIES AND ADLS
PAIN_FUNCTIONAL_ASSESSMENT: PREVENTS OR INTERFERES SOME ACTIVE ACTIVITIES AND ADLS

## 2024-03-10 ASSESSMENT — PAIN DESCRIPTION - FREQUENCY
FREQUENCY: CONTINUOUS
FREQUENCY: CONTINUOUS

## 2024-03-10 NOTE — PLAN OF CARE
Problem: Respiratory - Adult  Goal: Achieves optimal ventilation and oxygenation  3/9/2024 1921 by Dolores Emmanuel RCP  Outcome: Progressing

## 2024-03-10 NOTE — PLAN OF CARE
Problem: Respiratory - Adult  Goal: Achieves optimal ventilation and oxygenation  3/10/2024 1945 by Elisabeth Dover RCP  Flowsheets (Taken 3/10/2024 1945)  Achieves optimal ventilation and oxygenation:   Assess for changes in respiratory status   Position to facilitate oxygenation and minimize respiratory effort   Initiate smoking cessation protocol as indicated   Assess the need for suctioning and aspirate as needed   Respiratory therapy support as indicated   Assess for changes in mentation and behavior   Oxygen supplementation based on oxygen saturation or arterial blood gases   Encourage broncho-pulmonary hygiene including cough, deep breathe, incentive spirometry   Assess and instruct to report shortness of breath or any respiratory difficulty

## 2024-03-10 NOTE — PLAN OF CARE
Problem: Chronic Conditions and Co-morbidities  Goal: Patient's chronic conditions and co-morbidity symptoms are monitored and maintained or improved  3/10/2024 0631 by Radha Bee RN  Outcome: Progressing  Flowsheets (Taken 3/9/2024 2000)  Care Plan - Patient's Chronic Conditions and Co-Morbidity Symptoms are Monitored and Maintained or Improved: Monitor and assess patient's chronic conditions and comorbid symptoms for stability, deterioration, or improvement     Problem: Safety - Adult  Goal: Free from fall injury  3/10/2024 0631 by Radha Bee RN  Outcome: Progressing  Flowsheets (Taken 3/9/2024 2000)  Free From Fall Injury: Instruct family/caregiver on patient safety     Problem: Discharge Planning  Goal: Discharge to home or other facility with appropriate resources  3/10/2024 0631 by Radha Bee RN  Outcome: Progressing  Flowsheets (Taken 3/9/2024 2000)  Discharge to home or other facility with appropriate resources: Identify barriers to discharge with patient and caregiver     Problem: Respiratory - Adult  Goal: Achieves optimal ventilation and oxygenation  3/10/2024 0631 by Radha Bee RN  Outcome: Progressing  Flowsheets (Taken 3/9/2024 2000)  Achieves optimal ventilation and oxygenation: Assess for changes in respiratory status     Problem: Pain  Goal: Verbalizes/displays adequate comfort level or baseline comfort level  3/10/2024 0631 by Radha Bee RN  Outcome: Progressing  Flowsheets  Taken 3/10/2024 0400  Verbalizes/displays adequate comfort level or baseline comfort level: Assess pain using appropriate pain scale  Taken 3/9/2024 2000  Verbalizes/displays adequate comfort level or baseline comfort level: Assess pain using appropriate pain scale     Problem: ABCDS Injury Assessment  Goal: Absence of physical injury  3/10/2024 0631 by Radha Bee RN  Outcome: Progressing  Flowsheets (Taken 3/9/2024 2000)  Absence of Physical Injury: Implement safety measures based on

## 2024-03-10 NOTE — PLAN OF CARE
Problem: Chronic Conditions and Co-morbidities  Goal: Patient's chronic conditions and co-morbidity symptoms are monitored and maintained or improved  3/10/2024 1704 by Josy Espinoza RN  Outcome: Progressing  3/10/2024 0631 by Radha Bee RN  Outcome: Progressing  Flowsheets (Taken 3/9/2024 2000)  Care Plan - Patient's Chronic Conditions and Co-Morbidity Symptoms are Monitored and Maintained or Improved: Monitor and assess patient's chronic conditions and comorbid symptoms for stability, deterioration, or improvement     Problem: Safety - Adult  Goal: Free from fall injury  3/10/2024 1704 by Josy Espinoza RN  Outcome: Progressing  3/10/2024 0631 by Radha Bee RN  Outcome: Progressing  Flowsheets (Taken 3/9/2024 2000)  Free From Fall Injury: Instruct family/caregiver on patient safety     Problem: Discharge Planning  Goal: Discharge to home or other facility with appropriate resources  3/10/2024 1704 by Josy Espinoza RN  Outcome: Progressing  3/10/2024 0631 by Radha Bee RN  Outcome: Progressing  Flowsheets (Taken 3/9/2024 2000)  Discharge to home or other facility with appropriate resources: Identify barriers to discharge with patient and caregiver     Problem: Respiratory - Adult  Goal: Achieves optimal ventilation and oxygenation  3/10/2024 1704 by Josy Espinoza RN  Outcome: Progressing  3/10/2024 0631 by Radha Bee RN  Outcome: Progressing  Flowsheets (Taken 3/9/2024 2000)  Achieves optimal ventilation and oxygenation: Assess for changes in respiratory status     Problem: Pain  Goal: Verbalizes/displays adequate comfort level or baseline comfort level  3/10/2024 1704 by Josy Espinoza RN  Outcome: Progressing  3/10/2024 0631 by Radha Bee RN  Outcome: Progressing  Flowsheets  Taken 3/10/2024 0400  Verbalizes/displays adequate comfort level or baseline comfort level: Assess pain using appropriate pain scale  Taken 3/9/2024 2000  Verbalizes/displays adequate  comfort level or baseline comfort level: Assess pain using appropriate pain scale     Problem: Skin/Tissue Integrity  Goal: Absence of new skin breakdown  Description: 1.  Monitor for areas of redness and/or skin breakdown  2.  Assess vascular access sites hourly  3.  Every 4-6 hours minimum:  Change oxygen saturation probe site  4.  Every 4-6 hours:  If on nasal continuous positive airway pressure, respiratory therapy assess nares and determine need for appliance change or resting period.  3/10/2024 1704 by Josy Espinoza RN  Outcome: Progressing  3/10/2024 0631 by Radha Bee RN  Outcome: Progressing     Problem: ABCDS Injury Assessment  Goal: Absence of physical injury  3/10/2024 1704 by Josy Espinoza RN  Outcome: Progressing  Flowsheets (Taken 3/10/2024 0800)  Absence of Physical Injury: Implement safety measures based on patient assessment  3/10/2024 0631 by Radha Bee RN  Outcome: Progressing  Flowsheets (Taken 3/9/2024 2000)  Absence of Physical Injury: Implement safety measures based on patient assessment     Problem: Nutrition Deficit:  Goal: Optimize nutritional status  3/10/2024 1704 by Josy Espinoza RN  Outcome: Progressing  3/10/2024 0631 by Radha Bee RN  Outcome: Progressing

## 2024-03-10 NOTE — PROGRESS NOTES
INTENSIVE CARE UNIT  Resident Physician Progress Note    Patient - Kb Wilson  Date of Admission -  2/29/2024  3:40 PM  Date of Evaluation -  3/10/2024  Room and Bed Number -  3003/3003-01   Hospital Day - 10    HPI:     Kb Wilson is a 78 y.o. with a history of:  CAD status post CABG on Plavix  CHF with ejection fraction 20% has AICD  Paroxysmal A-fib on Eliquis  Diabetes mellitus  Hypertension  Stage III kidney disease     Patient presented to [place] ED on 2/27/2024 for BRBPR just prior to presenting to the ED.  Concern for GI bleed.  CT AP non con suggest diverticulosis.  Patient was admitted for GI consult was started on Protonix infusion.  Patient had EGD that was normal.  Patient was prepped for colonoscopy.  Patient was given propofol and the colonoscopy was about to begin when he cardiac arrested.  Cardiac arrested a total of 3 times in the OR.  He was intubated.  He was then transferred to the ICU where he coded 2 more times.  He had a CODE BLUE total of 5 times [facility].  He was transferred to [facility] for cardiac evaluation on 2/29.     Patient was able to have some of the colonoscopy done that did not show any active bleeding however does show diverticula consistent with diverticulosis.  Recommendations from GI doctor at [place] patient's hemoglobin drops again to get a CTA abdomen pelvis and transfuse.  Keep hemoglobin above 7.  Patient is on a Protonix drip.     Initial hemoglobin 8.5, white blood cell count 20.8, chest x-ray with pulmonary vascular congestion      Interval hx:  3/1/2024: Extubated, consult GI, wean down pressors.  3/2/2024: Started on clear liquid diet  3/3/2024: NPO at midnight for cath on 3/4  3/4- cath showed only vessel patent is LIMA, planning for staged PCI  3/07: Underwent [place] to L main extending into LCx    3/9: lopressor 12.5 BID started       SUBJECTIVE:     OVERNIGHT EVENTS:    started lopressor with improvement in HR, bipap used  almost continuously     TODAY:    Plan to stepdown    969 in/1700 out, BM yesterday  Afeb overnight 97  Tachycardic to 110s down to 70s   Maps greater than 75, isolated 63  Tachypnea improved on bipap  Labs BUN 38 from 44, CR 1.6 from 1.7  Glucose 242 this AM    AWAKE & FOLLOWING COMMANDS:  [] No   [x] Yes    SEDATION:  RAAS Score:  [] Propofol gtt  [] Versed gtt  [] Ativan gtt   [x] No Sedation    PARALYZED:  [x] No    [] Yes    VASOPRESSORS:  [x] No    [] Yes  [] Levophed [] Dopamine [] Vasopressin  [] Dobutamine [] Phenylephrine [] Epinephrine      OBJECTIVE:     VITAL SIGNS:  /60   Pulse 77   Temp 97.1 °F (36.2 °C) (Axillary)   Resp 18   Ht 1.905 m (6' 3\")   Wt (!) 136.3 kg (300 lb 7.8 oz)   SpO2 98%   BMI 37.56 kg/m²   Tmax over 24 hours:  Temp (24hrs), Av.4 °F (36.3 °C), Min:97.1 °F (36.2 °C), Max:97.7 °F (36.5 °C)      Patient Vitals for the past 8 hrs:   BP Temp Temp src Pulse Resp SpO2 Weight   03/10/24 0600 113/60 -- -- 77 18 98 % --   03/10/24 0500 107/65 -- -- 77 17 100 % --   03/10/24 0400 103/66 97.1 °F (36.2 °C) Axillary (!) 108 18 97 % --   03/10/24 0352 -- -- -- (!) 108 18 97 % --   03/10/24 0300 100/70 -- -- (!) 112 27 97 % --   03/10/24 0100 -- -- -- (!) 115 26 97 % --   03/10/24 0000 101/67 97.3 °F (36.3 °C) Axillary (!) 112 24 100 % (!) 136.3 kg (300 lb 7.8 oz)   24 2338 -- -- -- (!) 114 24 100 % --   24 2300 98/70 -- -- (!) 115 23 99 % --         Intake/Output Summary (Last 24 hours) at 3/10/2024 0657  Last data filed at 3/10/2024 0615  Gross per 24 hour   Intake 939 ml   Output 1700 ml   Net -761 ml     Date 03/10/24 0000 - 03/10/24 2359   Shift 1518-2769 5564-6111 0582-1764 24 Hour Total   INTAKE   P.O.(mL/kg/hr) 240   240   Shift Total(mL/kg) 240(1.8)   240(1.8)   OUTPUT   Urine(mL/kg/hr) 850   850   Shift Total(mL/kg) 850(6.2)   850(6.2)   Weight (kg) 136.3 136.3 136.3 136.3     Wt Readings from Last 3 Encounters:   03/10/24 (!) 136.3 kg (300 lb 7.8 oz)

## 2024-03-10 NOTE — PROGRESS NOTES
Nephrology Progress Note      SUBJECTIVE    Patient was seen and examined at bedside.  He reports over the past 2 days he feels his breathing has been more difficult, not able to take off BiPAP for more than a short time.  He has been receiving torsemide.  Patient had 800 mL urine output and 1 incontinent episode so far this morning.  He is net -3 L since admission.  Blood pressures running in the 120s-130s today.     Labs reviewed from this morning sodium 130, potassium 4.9, chloride 98, bicarb 29, creatinine 1.6, Hgb 8.3    OBJECTIVE      CURRENT TEMPERATURE:  Temp: 97.7 °F (36.5 °C)  MAXIMUM TEMPERATURE OVER 24HRS:  Temp (24hrs), Av.5 °F (36.4 °C), Min:97.1 °F (36.2 °C), Max:97.7 °F (36.5 °C)    CURRENT RESPIRATORY RATE:  Respirations: 26  CURRENT PULSE:  Pulse: 74  CURRENT BLOOD PRESSURE:  BP: 120/67  24HR BLOOD PRESSURE RANGE:  Systolic (24hrs), Av , Min:82 , Max:125   ; Diastolic (24hrs), Av, Min:55, Max:84    24HR INTAKE/OUTPUT:    Intake/Output Summary (Last 24 hours) at 3/10/2024 1127  Last data filed at 3/10/2024 0826  Gross per 24 hour   Intake 630 ml   Output 1700 ml   Net -1070 ml       WEIGHT :Patient Vitals for the past 96 hrs (Last 3 readings):   Weight   03/10/24 0000 (!) 136.3 kg (300 lb 7.8 oz)   24 0600 127.3 kg (280 lb 10.3 oz)   24 0600 120.5 kg (265 lb 10.5 oz)       PHYSICAL EXAM      GENERAL APPEARANCE: Awake, alert, in no acute distress, on BiPAP  SKIN: warm and dry, no rash or erythema  EYES: conjunctivae normal and sclera anicteric  ENT: no thrush no pharyngeal congestion   NECK:   No JVD. No carotid bruits and no carotid lymphadenopathy .  PULMONARY: Bilateral air entry with fair air movement a few faint bibasilar crackles noted, on BiPAP.   CARDIOVASCULAR: Tachycardic rate, regular rhythm, positive S1 and S2 and a positive systolic murmur  ABDOMEN: Obese, soft nontender, bowel sounds present,  no ascites.       EXTREMITIES: Mild pedal edema and does have a  20.0* 19.8*    309 274   MPV 10.7 10.6 11.0        BMP:   Recent Labs     03/08/24  0402 03/09/24  0403 03/10/24  0745    139 138   K 5.3 4.1 4.9    102 99   CO2 24 29 29   BUN 44* 44* 38*   CREATININE 1.6* 1.7* 1.6*   GLUCOSE 273* 166* 219*   CALCIUM 7.9* 7.8* 7.5*        BNP:No results found for: \"BNP\"  PHOSPHORUS:  No results for input(s): \"PHOS\" in the last 72 hours.  MAGNESIUM:   No results for input(s): \"MG\" in the last 72 hours.    ALBUMIN: No results for input(s): \"LABALBU\" in the last 72 hours.  IRON:    Lab Results   Component Value Date/Time    IRON 47 02/28/2024 03:45 AM     IRON SATURATION:  No components found for: \"LABIRON\"  TIBC:    Lab Results   Component Value Date/Time    TIBC 183 02/28/2024 03:45 AM     FERRITIN:  No results found for: \"FERRITIN\"  ANCA:   Lab Results   Component Value Date    ANCA NEGATIVE 03/05/2024       SPEP:   Lab Results   Component Value Date/Time    PROT 5.1 03/05/2024 05:47 AM     UPEP: No results found for: \"TPU\"   HEPBSAG:No results found for: \"HEPBSAG\"  HEPCAB:No results found for: \"HEPCAB\"  C3:   Lab Results   Component Value Date    C3 112 03/05/2024     C4:   Lab Results   Component Value Date    C4 20 03/05/2024     MPO ANCA:   Lab Results   Component Value Date/Time    MPO <0.3 03/05/2024 05:47 AM    .  PR3 ANCA:    Lab Results   Component Value Date/Time    PR3 <0.7 03/05/2024 05:47 AM     URINE SODIUM:  No results found for: \"ALFA\"   URINE CREATININE:    Lab Results   Component Value Date/Time    LABCREA 109.7 03/04/2024 09:39 PM     URINE EOSINOPHILS: No results found for: \"UREO\"  URINE PROTEIN:  No results found for: \"TPU\"  URINALYSIS:  U/A:   Lab Results   Component Value Date/Time    NITRU NEGATIVE 03/04/2024 09:39 PM    COLORU Yellow 03/04/2024 09:39 PM    PHUR 5.5 03/04/2024 09:39 PM    WBCUA 10 TO 20 03/04/2024 09:39 PM    RBCUA 50  03/04/2024 09:39 PM    BACTERIA None 03/04/2024 09:39 PM    SPECGRAV 1.035 03/04/2024 09:39 PM     Hopefully, this will help improve the diuresis and improve the patient's congestive heart failure.  Creatinine, fortunately, remained stable.    Kaleb Adams M.D.   Nephrology attending physician  [facility]  [id number]

## 2024-03-11 PROBLEM — I95.9 ARTERIAL HYPOTENSION: Status: ACTIVE | Noted: 2024-03-11

## 2024-03-11 LAB
ANION GAP SERPL CALCULATED.3IONS-SCNC: 9 MMOL/L (ref 9–16)
BASOPHILS # BLD: <0.03 K/UL (ref 0–0.2)
BASOPHILS NFR BLD: 0 % (ref 0–2)
BUN SERPL-MCNC: 31 MG/DL (ref 8–23)
CALCIUM SERPL-MCNC: 7.7 MG/DL (ref 8.6–10.4)
CHLORIDE SERPL-SCNC: 98 MMOL/L (ref 98–107)
CO2 SERPL-SCNC: 31 MMOL/L (ref 20–31)
CREAT SERPL-MCNC: 1.4 MG/DL (ref 0.7–1.2)
EOSINOPHIL # BLD: 0.14 K/UL (ref 0–0.44)
EOSINOPHILS RELATIVE PERCENT: 2 % (ref 1–4)
ERYTHROCYTE [DISTWIDTH] IN BLOOD BY AUTOMATED COUNT: 19.3 % (ref 11.8–14.4)
GFR SERPL CREATININE-BSD FRML MDRD: 52 ML/MIN/1.73M2
GLUCOSE BLD-MCNC: 121 MG/DL (ref 75–110)
GLUCOSE BLD-MCNC: 157 MG/DL (ref 75–110)
GLUCOSE BLD-MCNC: 189 MG/DL (ref 75–110)
GLUCOSE BLD-MCNC: 70 MG/DL (ref 75–110)
GLUCOSE SERPL-MCNC: 135 MG/DL (ref 74–99)
HCT VFR BLD AUTO: 28 % (ref 40.7–50.3)
HGB BLD-MCNC: 8.3 G/DL (ref 13–17)
IMM GRANULOCYTES # BLD AUTO: 0.04 K/UL (ref 0–0.3)
IMM GRANULOCYTES NFR BLD: 0 %
LYMPHOCYTES NFR BLD: 1.23 K/UL (ref 1.1–3.7)
LYMPHOCYTES RELATIVE PERCENT: 14 % (ref 24–43)
MCH RBC QN AUTO: 28.3 PG (ref 25.2–33.5)
MCHC RBC AUTO-ENTMCNC: 29.6 G/DL (ref 28.4–34.8)
MCV RBC AUTO: 95.6 FL (ref 82.6–102.9)
MONOCYTES NFR BLD: 0.58 K/UL (ref 0.1–1.2)
MONOCYTES NFR BLD: 6 % (ref 3–12)
NEUTROPHILS NFR BLD: 78 % (ref 36–65)
NEUTS SEG NFR BLD: 7.03 K/UL (ref 1.5–8.1)
NRBC BLD-RTO: 0.2 PER 100 WBC
PLATELET # BLD AUTO: 260 K/UL (ref 138–453)
PMV BLD AUTO: 10.5 FL (ref 8.1–13.5)
POTASSIUM SERPL-SCNC: 3.8 MMOL/L (ref 3.7–5.3)
RBC # BLD AUTO: 2.93 M/UL (ref 4.21–5.77)
RBC # BLD: ABNORMAL 10*6/UL
SODIUM SERPL-SCNC: 138 MMOL/L (ref 136–145)
WBC OTHER # BLD: 9 K/UL (ref 3.5–11.3)

## 2024-03-11 PROCEDURE — 94761 N-INVAS EAR/PLS OXIMETRY MLT: CPT

## 2024-03-11 PROCEDURE — 6370000000 HC RX 637 (ALT 250 FOR IP): Performed by: STUDENT IN AN ORGANIZED HEALTH CARE EDUCATION/TRAINING PROGRAM

## 2024-03-11 PROCEDURE — 80048 BASIC METABOLIC PNL TOTAL CA: CPT

## 2024-03-11 PROCEDURE — 36415 COLL VENOUS BLD VENIPUNCTURE: CPT

## 2024-03-11 PROCEDURE — 6370000000 HC RX 637 (ALT 250 FOR IP)

## 2024-03-11 PROCEDURE — 82947 ASSAY GLUCOSE BLOOD QUANT: CPT

## 2024-03-11 PROCEDURE — 2060000000 HC ICU INTERMEDIATE R&B

## 2024-03-11 PROCEDURE — 6360000002 HC RX W HCPCS: Performed by: INTERNAL MEDICINE

## 2024-03-11 PROCEDURE — 2580000003 HC RX 258

## 2024-03-11 PROCEDURE — 85025 COMPLETE CBC W/AUTO DIFF WBC: CPT

## 2024-03-11 PROCEDURE — 99291 CRITICAL CARE FIRST HOUR: CPT | Performed by: INTERNAL MEDICINE

## 2024-03-11 PROCEDURE — 6370000000 HC RX 637 (ALT 250 FOR IP): Performed by: INTERNAL MEDICINE

## 2024-03-11 PROCEDURE — 2700000000 HC OXYGEN THERAPY PER DAY

## 2024-03-11 RX ADMIN — INSULIN GLARGINE 40 UNITS: 100 INJECTION, SOLUTION SUBCUTANEOUS at 20:16

## 2024-03-11 RX ADMIN — APIXABAN 5 MG: 5 TABLET, FILM COATED ORAL at 20:04

## 2024-03-11 RX ADMIN — FUROSEMIDE 20 MG: 10 INJECTION, SOLUTION INTRAMUSCULAR; INTRAVENOUS at 21:52

## 2024-03-11 RX ADMIN — SODIUM CHLORIDE, PRESERVATIVE FREE 10 ML: 5 INJECTION INTRAVENOUS at 08:53

## 2024-03-11 RX ADMIN — FUROSEMIDE 20 MG: 10 INJECTION, SOLUTION INTRAMUSCULAR; INTRAVENOUS at 08:53

## 2024-03-11 RX ADMIN — ACETAMINOPHEN 650 MG: 325 TABLET ORAL at 08:50

## 2024-03-11 RX ADMIN — APIXABAN 5 MG: 5 TABLET, FILM COATED ORAL at 08:51

## 2024-03-11 RX ADMIN — MIDODRINE HYDROCHLORIDE 10 MG: 5 TABLET ORAL at 11:57

## 2024-03-11 RX ADMIN — TICAGRELOR 90 MG: 90 TABLET ORAL at 08:55

## 2024-03-11 RX ADMIN — ASPIRIN 81 MG 81 MG: 81 TABLET ORAL at 08:51

## 2024-03-11 RX ADMIN — MIDODRINE HYDROCHLORIDE 10 MG: 5 TABLET ORAL at 16:33

## 2024-03-11 RX ADMIN — METOPROLOL TARTRATE 12.5 MG: 25 TABLET, FILM COATED ORAL at 20:04

## 2024-03-11 RX ADMIN — SODIUM CHLORIDE, PRESERVATIVE FREE 10 ML: 5 INJECTION INTRAVENOUS at 20:05

## 2024-03-11 RX ADMIN — PANTOPRAZOLE SODIUM 40 MG: 40 TABLET, DELAYED RELEASE ORAL at 16:33

## 2024-03-11 RX ADMIN — TICAGRELOR 90 MG: 90 TABLET ORAL at 21:10

## 2024-03-11 RX ADMIN — ATORVASTATIN CALCIUM 80 MG: 80 TABLET, FILM COATED ORAL at 20:04

## 2024-03-11 RX ADMIN — METOPROLOL TARTRATE 12.5 MG: 25 TABLET, FILM COATED ORAL at 08:51

## 2024-03-11 RX ADMIN — MIDODRINE HYDROCHLORIDE 10 MG: 5 TABLET ORAL at 08:51

## 2024-03-11 RX ADMIN — PANTOPRAZOLE SODIUM 40 MG: 40 TABLET, DELAYED RELEASE ORAL at 05:05

## 2024-03-11 ASSESSMENT — PAIN SCALES - GENERAL
PAINLEVEL_OUTOF10: 6
PAINLEVEL_OUTOF10: 4
PAINLEVEL_OUTOF10: 0
PAINLEVEL_OUTOF10: 6

## 2024-03-11 ASSESSMENT — PAIN DESCRIPTION - LOCATION
LOCATION: CHEST
LOCATION: CHEST

## 2024-03-11 NOTE — PLAN OF CARE
Problem: Chronic Conditions and Co-morbidities  Goal: Patient's chronic conditions and co-morbidity symptoms are monitored and maintained or improved  3/11/2024 0911 by Ghassan Echols RN  Outcome: Progressing  3/11/2024 0627 by Radha Bee RN  Outcome: Progressing  Flowsheets (Taken 3/10/2024 2000)  Care Plan - Patient's Chronic Conditions and Co-Morbidity Symptoms are Monitored and Maintained or Improved: Monitor and assess patient's chronic conditions and comorbid symptoms for stability, deterioration, or improvement     Problem: Safety - Adult  Goal: Free from fall injury  3/11/2024 0911 by Ghassan Echols RN  Outcome: Progressing  Flowsheets (Taken 3/11/2024 0910)  Free From Fall Injury: Instruct family/caregiver on patient safety  3/11/2024 0627 by Radha Bee RN  Outcome: Progressing  Flowsheets (Taken 3/10/2024 2000)  Free From Fall Injury: Instruct family/caregiver on patient safety     Problem: Discharge Planning  Goal: Discharge to home or other facility with appropriate resources  3/11/2024 0911 by Ghassan Echols RN  Outcome: Progressing  3/11/2024 0627 by Radha Bee RN  Outcome: Progressing  Flowsheets (Taken 3/10/2024 2000)  Discharge to home or other facility with appropriate resources: Identify barriers to discharge with patient and caregiver     Problem: Respiratory - Adult  Goal: Achieves optimal ventilation and oxygenation  3/11/2024 0911 by Ghassan Echols RN  Outcome: Progressing  3/11/2024 0627 by Radha Bee RN  Outcome: Progressing  Flowsheets (Taken 3/10/2024 2000)  Achieves optimal ventilation and oxygenation: Assess for changes in respiratory status  3/10/2024 1945 by Elisabeth Dover RCP  Flowsheets (Taken 3/10/2024 1945)  Achieves optimal ventilation and oxygenation:   Assess for changes in respiratory status   Position to facilitate oxygenation and minimize respiratory effort   Initiate smoking cessation protocol as indicated   Assess the need for  suctioning and aspirate as needed   Respiratory therapy support as indicated   Assess for changes in mentation and behavior   Oxygen supplementation based on oxygen saturation or arterial blood gases   Encourage broncho-pulmonary hygiene including cough, deep breathe, incentive spirometry   Assess and instruct to report shortness of breath or any respiratory difficulty     Problem: Pain  Goal: Verbalizes/displays adequate comfort level or baseline comfort level  3/11/2024 0911 by Ghassan Echols RN  Outcome: Progressing  3/11/2024 0627 by Radha Bee RN  Outcome: Progressing  Flowsheets  Taken 3/11/2024 0400  Verbalizes/displays adequate comfort level or baseline comfort level: Assess pain using appropriate pain scale  Taken 3/11/2024 0000  Verbalizes/displays adequate comfort level or baseline comfort level: Assess pain using appropriate pain scale  Taken 3/10/2024 2000  Verbalizes/displays adequate comfort level or baseline comfort level: Assess pain using appropriate pain scale     Problem: Skin/Tissue Integrity  Goal: Absence of new skin breakdown  Description: 1.  Monitor for areas of redness and/or skin breakdown  2.  Assess vascular access sites hourly  3.  Every 4-6 hours minimum:  Change oxygen saturation probe site  4.  Every 4-6 hours:  If on nasal continuous positive airway pressure, respiratory therapy assess nares and determine need for appliance change or resting period.  3/11/2024 0911 by Ghassan Echols RN  Outcome: Progressing  3/11/2024 0627 by Radha Bee RN  Outcome: Progressing     Problem: ABCDS Injury Assessment  Goal: Absence of physical injury  3/11/2024 0911 by Ghassan Echols RN  Outcome: Progressing  Flowsheets (Taken 3/11/2024 0910)  Absence of Physical Injury: Implement safety measures based on patient assessment  3/11/2024 0627 by Radha Bee RN  Outcome: Progressing  Flowsheets (Taken 3/10/2024 2000)  Absence of Physical Injury: Implement safety measures based  on patient assessment     Problem: Nutrition Deficit:  Goal: Optimize nutritional status  3/11/2024 0911 by Ghassan Echols, RN  Outcome: Progressing  3/11/2024 0627 by Radha Bee, RN  Outcome: Progressing

## 2024-03-11 NOTE — PROGRESS NOTES
Critical care team - Resident sign-out to medicine service      Date and time: 3/11/2024 4:56 PM  Patient's name:  Kb Wilson  Medical Record Number: [id number]  Patient's account/billing number: [id number]  Patient's YOB: 1945  Age: 78 y.o.  Date of Admission: 2/29/2024  3:40 PM  Length of stay during current admission: 11    Primary Care Physician: Viridiana Taveras    Code Status: Full Code    Mode of physician to physician communication:        [x] Via telephone   [] In person     Date and time of sign-out: 3/11/2024 4:56 PM    Accepting Internal Medicine resident: Dr. Gomez    Accepting Medicine team: Internal Medicine     Accepting team's attending: Dr. Pedersen     Patient's current ICU Bed:  3003     Patient's assigned bed on floor:  2001        [] Med-Surg Monitored [x] Step-down       [] Psychiatry ICU       [] Psych floor     Reason for ICU admission:     Post Cardiac Arrest    ICU course summary:     Kb Wilson is a 78 y.o. with a history of:  CAD status post CABG on Plavix  CHF with ejection fraction 20% has AICD  Paroxysmal A-fib on Eliquis  Diabetes mellitus  Hypertension  Stage III kidney disease     Patient presented to [place] emergency department on 2/27/2024 for bright red blood when he wiped when going to the bathroom just prior to presenting to the emergency room.  Concern for GI bleed.  CT abdomen pelvis without contrast suggest diverticulosis.  Patient was admitted for GI consult was started on Protonix infusion.  Patient had EGD that was normal.  Patient was prepped for colonoscopy today.  Patient was given propofol and the colonoscopy was about to begin when he cardiac arrested.  Cardiac arrested a total of 3 times in the OR.  He was intubated.  He was then transferred to the ICU where he coded 2 more times.  He had a CODE BLUE total of 5 times [facility].  He was transferred to [facility] for cardiac evaluation.     Patient was able to  CAD in native artery 02/29/2024    Rectal bleeding 02/28/2024    Acute anemia 02/28/2024    Lower abdominal pain 02/28/2024    Diverticulitis 02/28/2024    Hx of CABG 02/28/2024    GI bleed 02/27/2024    Hypertension 02/23/2024    Diabetes mellitus ([facility]) 02/23/2024    CHF (congestive heart failure) ([facility]) 02/23/2024    Atherosclerotic heart disease 02/23/2024    Elevated troponin 02/23/2024    Ischemic cardiomyopathy 02/23/2024    AICD (automatic cardioverter/defibrillator) present 02/23/2024    Fall 02/22/2024    PAD (peripheral artery disease) ([facility]) 07/07/2023    Diabetic neuropathy ([facility]) 06/05/2023    Stage 3 chronic kidney disease ([facility]) 06/05/2023    PAF (paroxysmal atrial fibrillation) ([facility]) 10/27/2014     Neurologic:   Neuro intact   Neuro checks per protocol  Cardiovascular:  New left pleural effusion, restarted on torsemide on 3/05- bipap as needed  Repeat CXR 3/06 somewhat improved  Cardiac arrest, CHF s/p AICD placement, S/p CABG- awaiting cath  Cardiogenic shock resolved  Currently off pressors  EF 20-25%, grade 3 diastolic dysfunction, RV dysfunction, moderate pulm HTN  Maintain MAP >65  3/4 cath- only LIMA patent  S/p DAYTON to LAD and LCx 3/07  Eliquis  Pulmonary:  Acute hypoxic respiratory failure  New Left pleural effusion, restarted on torsemide 3/05  Extubated 3/1, doing well  Maintain oxygen sats >92%  Continue pulmonary toilet  GI/Nutrition  GI bleed, resolved  If rebleed occurs will get CTA abdomen pelvis  Gi consulted- ok to resume AC, thought to be diverticular bleed  Ulcer Prophylaxis: Protonix  Renal/Fluid/Electrolyte  LUZMA on CKD  Nephrology following  Resume torsemide 20mg oral daily  Monitor electrolytes, replace PRN   ID  Monitor for fever and leucocytosis  Antimicrobials: None  Blood cultures 3/01 NGTD  Hematology:  Transfuse if Hb drops below 7 or as clinically indicated.   Endocrine:   Type 2 DM  Lantus 15u qhs  High dose corrective scale insulin  Monitor glucose levels  DVT  Prophylaxis  EPC Cuffs  Eliquis          CODE STATUS: Full Code       Recommended Follow-up:     Continue to diurese with Nephrology recommendations  Resume cozaar when okay with nephrology per cardio reccs        Above mentioned assessment and plan was discussed by me with the admitting medicine resident. The medicine team assigned to the patient by medicine admitting resident will be following up the patient from now onwards on the floor.     Evette John MD, M.D.  Critical care resident  Department of Internal Medicine/ Critical care  [facility] [place] ([place])             3/11/2024, 4:56 PM

## 2024-03-11 NOTE — PROGRESS NOTES
Renal Progress Note    Patient :  Kb Wilson; 78 y.o. MRN# [id number]  Location:  3003/3003-01  Attending:  Judson Cortés MD  Admit Date:  2/29/2024   Hospital Day: 11    Subjective:     Patient seen and examined.  No new issues reported overnight.  BMP results from today reviewed sodium 139 potassium 3 point, chloride 98, bicarb 31, calcium 7.7, BUN 31, creatinine did improve to 1.4 mg/dl.  Urine output documented as about 3.7 L and x 2 more unmeasured in the last 24 hours.  Patient is currently on midodrine 10 mg 3 times a day.  On Lasix 20 mg IV twice daily.    Outpatient Medications:     Medications Prior to Admission: carvedilol (COREG) 3.125 MG tablet, Take 1 tablet by mouth 2 times daily (with meals)  aspirin 81 MG chewable tablet, Take 1 tablet by mouth daily (Patient not taking: Reported on 2/27/2024)  atorvastatin (LIPITOR) 80 MG tablet, Take 1 tablet by mouth nightly  calcitRIOL (ROCALTROL) 0.25 MCG capsule, Take 1 capsule by mouth as needed Three times weekly  apixaban (ELIQUIS) 5 MG TABS tablet, Take 1 tablet by mouth 2 times daily  famotidine (PEPCID) 40 MG tablet, Take 1 tablet by mouth daily  OZEMPIC, 0.25 OR 0.5 MG/DOSE, 2 MG/3ML SOPN,   insulin glargine (LANTUS;BASAGLAR) 100 UNIT/ML injection pen, Inject 40 Units into the skin daily (with breakfast)  dapagliflozin (FARXIGA) 10 MG tablet, Take 1 tablet by mouth daily  losartan (COZAAR) 25 MG tablet, Take 1 tablet by mouth daily  mirtazapine (REMERON) 7.5 MG tablet, Take 1 tablet by mouth nightly  torsemide (DEMADEX) 20 MG tablet, Take 1 tablet by mouth daily    Current Medications:     Scheduled Meds:    pantoprazole  40 mg Oral BID AC    insulin glargine  40 Units SubCUTAneous Nightly    furosemide  20 mg IntraVENous Q12H    metoprolol tartrate  12.5 mg Oral BID    ticagrelor  90 mg Oral BID    apixaban  5 mg Oral BID    lidocaine  1 patch TransDERmal Daily    insulin lispro  0-16 Units SubCUTAneous TID WC    insulin lispro  0-4 Units

## 2024-03-11 NOTE — PLAN OF CARE
Problem: Chronic Conditions and Co-morbidities  Goal: Patient's chronic conditions and co-morbidity symptoms are monitored and maintained or improved  3/11/2024 0627 by Radha Bee RN  Outcome: Progressing  Flowsheets (Taken 3/10/2024 2000)  Care Plan - Patient's Chronic Conditions and Co-Morbidity Symptoms are Monitored and Maintained or Improved: Monitor and assess patient's chronic conditions and comorbid symptoms for stability, deterioration, or improvement     Problem: Safety - Adult  Goal: Free from fall injury  3/11/2024 0627 by Radha Bee RN  Outcome: Progressing  Flowsheets (Taken 3/10/2024 2000)  Free From Fall Injury: Instruct family/caregiver on patient safety     Problem: Discharge Planning  Goal: Discharge to home or other facility with appropriate resources  3/11/2024 0627 by Radha Bee RN  Outcome: Progressing  Flowsheets (Taken 3/10/2024 2000)  Discharge to home or other facility with appropriate resources: Identify barriers to discharge with patient and caregiver     Problem: Respiratory - Adult  Goal: Achieves optimal ventilation and oxygenation  3/11/2024 0627 by Radha Bee RN  Outcome: Progressing  Flowsheets (Taken 3/10/2024 2000)  Achieves optimal ventilation and oxygenation: Assess for changes in respiratory status     Problem: Pain  Goal: Verbalizes/displays adequate comfort level or baseline comfort level  3/11/2024 0627 by Radha Bee RN  Outcome: Progressing  Flowsheets  Taken 3/11/2024 0400  Verbalizes/displays adequate comfort level or baseline comfort level: Assess pain using appropriate pain scale  Taken 3/11/2024 0000  Verbalizes/displays adequate comfort level or baseline comfort level: Assess pain using appropriate pain scale  Taken 3/10/2024 2000  Verbalizes/displays adequate comfort level or baseline comfort level: Assess pain using appropriate pain scale

## 2024-03-11 NOTE — PROGRESS NOTES
INTENSIVE CARE UNIT  Resident Physician Progress Note    Patient - Kb Wilson  Date of Admission -  2/29/2024  3:40 PM  Date of Evaluation -  3/11/2024  Room and Bed Number -  3003/3003-01   Hospital Day - 11    SUBJECTIVE:     HISTORY OF PRESENT ILLNESS:    Kb Wilson is a 78 y.o. with a history of:  CAD status post CABG on Plavix  CHF with ejection fraction 20% has AICD  Paroxysmal A-fib on Eliquis  Diabetes mellitus  Hypertension  Stage III kidney disease     Patient presented to [place] emergency department on 2/27/2024 for bright red blood when he wiped when going to the bathroom just prior to presenting to the emergency room.  Concern for GI bleed.  CT abdomen pelvis without contrast suggest diverticulosis.  Patient was admitted for GI consult was started on Protonix infusion.  Patient had EGD that was normal.  Patient was prepped for colonoscopy today.  Patient was given propofol and the colonoscopy was about to begin when he cardiac arrested.  Cardiac arrested a total of 3 times in the OR.  He was intubated.  He was then transferred to the ICU where he coded 2 more times.  He had a CODE BLUE total of 5 times [facility].  He was transferred to [facility] for cardiac evaluation.     Patient was able to have some of the colonoscopy done that did not show any active bleeding however does show diverticula consistent with diverticulosis.  Recommendations from GI doctor at [place] patient's hemoglobin drops again to get a CTA abdomen pelvis and transfuse.  Keep hemoglobin above 7.  Patient is on a Protonix drip.     Initial hemoglobin 8.5, white blood cell count 20.8, chest x-ray shows pulmonary vascular congestion      Interval hx:  3/1/2024: Extubate, consult GI, wean down pressors.  3/2/2024: Started on clear liquid diet  3/3/2024: NPO at midnight for cath on 3/4  3/4- cath showed only vessel patent is LIMA, planning for staged PCI  3/07: Underwent DAYTON to L main extending  into LCx      OVERNIGHT EVENTS:      No overnight Acute events    TODAY:    Pt examined bedside  Pt is alert, awake , oriented , afebrile, Hemodynamically stable, Saturating on BiPAP  - His SOB is better today and off of BiPAP for  2hrs today  Labs reviewed  BUN:31, Creat:1.4, with eGFR 52 improved   On lasix 20 mg BD IV, Lopressor 12.5 mg BD     F.A.S.T. M. H.U.G.S. B.I.D.    Feeding Diet: ADULT DIET; Regular; 4 carb choices (60 gm/meal); Low Fat/Low Chol/High Fiber/2 gm Na  Fluids: No  Family: updated  Analgesic: Acetaminophen, Aspirin  Sedation: No  Thrombo-prophylaxis: Eliquis 5 mg BD  Mobility: Mobile  Heads up: 60 degrees  Ulcer prophylaxis: [x] PPI Agent Pantoprazole 40 mg BD  Glycemic control: Sugars acceptable (135) , getting Lantus 40 U nightly, HDSS  Spontaneous breathing trial: N/A  Bowel regimen/urine output: Glycolax/ 3.7 lit over 24 hr with I/O: -2.7 lit over 24 hrs  Indwelling catheter/lines: External catheter  De-escalation: N/A    OBJECTIVE:     VITAL SIGNS:  Patient Vitals for the past 8 hrs:   BP Temp Temp src Pulse Resp SpO2 Weight   24 0600 (!) 104/59 -- -- 76 23 98 % --   24 0500 (!) 99/56 -- -- 72 19 93 % --   24 0400 (!) 105/58 97.8 °F (36.6 °C) Axillary 72 19 98 % --   24 0300 (!) 112/53 -- -- 70 18 100 % --   24 0200 (!) 86/54 -- -- 70 18 100 % --   24 0100 100/62 -- -- 70 17 100 % --   24 0021 -- -- -- -- -- -- 132.9 kg (293 lb 1.6 oz)   24 0000 104/66 97.3 °F (36.3 °C) Axillary 71 23 98 % --       Last Body weight:   Wt Readings from Last 3 Encounters:   24 132.9 kg (293 lb 1.6 oz)   24 123 kg (271 lb 2.7 oz)   24 123.4 kg (272 lb 0.8 oz)       Body Mass Index : Body mass index is 36.63 kg/m².      Tmax over 24 hours: Temp (24hrs), Av.7 °F (36.5 °C), Min:97.3 °F (36.3 °C), Max:98.2 °F (36.8 °C)      Ins/Outs:   In: 968 [P.O.:948; I.V.:20]  Out: 3700 [Urine:3700]    PHYSICAL EXAM:  Constitutional: Appears well, no  earlier CT.      Left kidney not identified likely due to overlying bowel gas and ptotic   positioning.      No gross bladder abnormality but evaluation is quite limited.         XR CHEST PORTABLE   Final Result   No acute cardiopulmonary process.      Support tubes as described above.         XR CHEST PORTABLE   Final Result   Endotracheal tube tip is 5.3 cm above the annamaria.  Improved aeration of the   lungs compared with earlier today.         XR ABDOMEN FOR NG/OG/NE TUBE PLACEMENT   Final Result   Enteric tube side hole overlying the stomach.  Distal tip beyond the field of   view.             ASSESSMENT:     Patient Active Problem List    Diagnosis Date Noted    History of coronary angioplasty with insertion of stent 03/07/2024    NSTEMI (non-ST elevated myocardial infarction) ([facility]) 03/02/2024    CAD, multiple vessel 03/07/2024    Diverticular hemorrhage 03/02/2024    Acute blood loss anemia 03/02/2024    Cardiac arrest ([facility]) 02/29/2024    ACS (acute coronary syndrome) ([facility]) 02/29/2024    CAD in native artery 02/29/2024    Rectal bleeding 02/28/2024    Acute anemia 02/28/2024    Lower abdominal pain 02/28/2024    Diverticulitis 02/28/2024    Hx of CABG 02/28/2024    GI bleed 02/27/2024    Hypertension 02/23/2024    Diabetes mellitus ([facility]) 02/23/2024    CHF (congestive heart failure) ([facility]) 02/23/2024    Atherosclerotic heart disease 02/23/2024    Elevated troponin 02/23/2024    Ischemic cardiomyopathy 02/23/2024    AICD (automatic cardioverter/defibrillator) present 02/23/2024    Fall 02/22/2024    PAD (peripheral artery disease) ([facility]) 07/07/2023    Diabetic neuropathy ([facility]) 06/05/2023    Stage 3 chronic kidney disease ([facility]) 06/05/2023    PAF (paroxysmal atrial fibrillation) ([facility]) 10/27/2014        PLAN:      WEAN PER PROTOCOL:  []   No  []   Yes [x]   N/A                         ICU PROPHYLAXIS:                Stress ulcer:  [x]   PPI Agent []   [id number] []   Sucralfate []   Other []   None      VTE:  []

## 2024-03-11 NOTE — CARE COORDINATION
Transitional planning..3L NC, PT/OT ordered. Plan is home w/ [facility]- current, verified. Has walker and Home O2- will need travel tank for transport home.

## 2024-03-12 ENCOUNTER — APPOINTMENT (OUTPATIENT)
Dept: GENERAL RADIOLOGY | Age: 79
DRG: 981 | End: 2024-03-12
Attending: INTERNAL MEDICINE
Payer: COMMERCIAL

## 2024-03-12 LAB
ANION GAP SERPL CALCULATED.3IONS-SCNC: 8 MMOL/L (ref 9–16)
BASOPHILS # BLD: 0.03 K/UL (ref 0–0.2)
BASOPHILS NFR BLD: 0 % (ref 0–2)
BUN SERPL-MCNC: 23 MG/DL (ref 8–23)
CALCIUM SERPL-MCNC: 7.8 MG/DL (ref 8.6–10.4)
CHLORIDE SERPL-SCNC: 98 MMOL/L (ref 98–107)
CO2 SERPL-SCNC: 34 MMOL/L (ref 20–31)
CREAT SERPL-MCNC: 1.5 MG/DL (ref 0.7–1.2)
EOSINOPHIL # BLD: 0.16 K/UL (ref 0–0.44)
EOSINOPHILS RELATIVE PERCENT: 2 % (ref 1–4)
ERYTHROCYTE [DISTWIDTH] IN BLOOD BY AUTOMATED COUNT: 19.1 % (ref 11.8–14.4)
GFR SERPL CREATININE-BSD FRML MDRD: 48 ML/MIN/1.73M2
GLUCOSE BLD-MCNC: 142 MG/DL (ref 75–110)
GLUCOSE BLD-MCNC: 167 MG/DL (ref 75–110)
GLUCOSE BLD-MCNC: 194 MG/DL (ref 75–110)
GLUCOSE BLD-MCNC: 207 MG/DL (ref 75–110)
GLUCOSE SERPL-MCNC: 156 MG/DL (ref 74–99)
HCT VFR BLD AUTO: 27.8 % (ref 40.7–50.3)
HGB BLD-MCNC: 8.4 G/DL (ref 13–17)
IMM GRANULOCYTES # BLD AUTO: 0.07 K/UL (ref 0–0.3)
IMM GRANULOCYTES NFR BLD: 1 %
LYMPHOCYTES NFR BLD: 1.3 K/UL (ref 1.1–3.7)
LYMPHOCYTES RELATIVE PERCENT: 15 % (ref 24–43)
MCH RBC QN AUTO: 28 PG (ref 25.2–33.5)
MCHC RBC AUTO-ENTMCNC: 30.2 G/DL (ref 28.4–34.8)
MCV RBC AUTO: 92.7 FL (ref 82.6–102.9)
MONOCYTES NFR BLD: 0.52 K/UL (ref 0.1–1.2)
MONOCYTES NFR BLD: 6 % (ref 3–12)
NEUTROPHILS NFR BLD: 76 % (ref 36–65)
NEUTS SEG NFR BLD: 6.85 K/UL (ref 1.5–8.1)
NRBC BLD-RTO: 0 PER 100 WBC
PLATELET # BLD AUTO: 248 K/UL (ref 138–453)
PMV BLD AUTO: 10.5 FL (ref 8.1–13.5)
POTASSIUM SERPL-SCNC: 4.3 MMOL/L (ref 3.7–5.3)
RBC # BLD AUTO: 3 M/UL (ref 4.21–5.77)
RBC # BLD: ABNORMAL 10*6/UL
SODIUM SERPL-SCNC: 140 MMOL/L (ref 136–145)
WBC OTHER # BLD: 8.9 K/UL (ref 3.5–11.3)

## 2024-03-12 PROCEDURE — 6370000000 HC RX 637 (ALT 250 FOR IP): Performed by: STUDENT IN AN ORGANIZED HEALTH CARE EDUCATION/TRAINING PROGRAM

## 2024-03-12 PROCEDURE — 2580000003 HC RX 258

## 2024-03-12 PROCEDURE — 82947 ASSAY GLUCOSE BLOOD QUANT: CPT

## 2024-03-12 PROCEDURE — 94761 N-INVAS EAR/PLS OXIMETRY MLT: CPT

## 2024-03-12 PROCEDURE — 6370000000 HC RX 637 (ALT 250 FOR IP): Performed by: INTERNAL MEDICINE

## 2024-03-12 PROCEDURE — 6360000002 HC RX W HCPCS: Performed by: INTERNAL MEDICINE

## 2024-03-12 PROCEDURE — 36415 COLL VENOUS BLD VENIPUNCTURE: CPT

## 2024-03-12 PROCEDURE — 97116 GAIT TRAINING THERAPY: CPT

## 2024-03-12 PROCEDURE — 94660 CPAP INITIATION&MGMT: CPT

## 2024-03-12 PROCEDURE — 6370000000 HC RX 637 (ALT 250 FOR IP)

## 2024-03-12 PROCEDURE — 2060000000 HC ICU INTERMEDIATE R&B

## 2024-03-12 PROCEDURE — 2700000000 HC OXYGEN THERAPY PER DAY

## 2024-03-12 PROCEDURE — 80048 BASIC METABOLIC PNL TOTAL CA: CPT

## 2024-03-12 PROCEDURE — 97110 THERAPEUTIC EXERCISES: CPT

## 2024-03-12 PROCEDURE — 71046 X-RAY EXAM CHEST 2 VIEWS: CPT

## 2024-03-12 PROCEDURE — 97530 THERAPEUTIC ACTIVITIES: CPT

## 2024-03-12 PROCEDURE — 97535 SELF CARE MNGMENT TRAINING: CPT

## 2024-03-12 PROCEDURE — 85025 COMPLETE CBC W/AUTO DIFF WBC: CPT

## 2024-03-12 RX ORDER — FUROSEMIDE 40 MG/1
40 TABLET ORAL 2 TIMES DAILY
Status: DISCONTINUED | OUTPATIENT
Start: 2024-03-12 | End: 2024-03-15 | Stop reason: HOSPADM

## 2024-03-12 RX ORDER — AMIODARONE HYDROCHLORIDE 200 MG/1
200 TABLET ORAL DAILY
Status: ON HOLD | COMMUNITY
Start: 2023-09-13 | End: 2024-03-13 | Stop reason: HOSPADM

## 2024-03-12 RX ADMIN — MIDODRINE HYDROCHLORIDE 10 MG: 5 TABLET ORAL at 11:55

## 2024-03-12 RX ADMIN — FUROSEMIDE 40 MG: 40 TABLET ORAL at 17:15

## 2024-03-12 RX ADMIN — MIDODRINE HYDROCHLORIDE 10 MG: 5 TABLET ORAL at 09:49

## 2024-03-12 RX ADMIN — INSULIN LISPRO 4 UNITS: 100 INJECTION, SOLUTION INTRAVENOUS; SUBCUTANEOUS at 17:20

## 2024-03-12 RX ADMIN — PANTOPRAZOLE SODIUM 40 MG: 40 TABLET, DELAYED RELEASE ORAL at 17:15

## 2024-03-12 RX ADMIN — ASPIRIN 81 MG 81 MG: 81 TABLET ORAL at 09:49

## 2024-03-12 RX ADMIN — SODIUM CHLORIDE, PRESERVATIVE FREE 10 ML: 5 INJECTION INTRAVENOUS at 09:50

## 2024-03-12 RX ADMIN — TICAGRELOR 90 MG: 90 TABLET ORAL at 21:33

## 2024-03-12 RX ADMIN — METOPROLOL TARTRATE 12.5 MG: 25 TABLET, FILM COATED ORAL at 09:49

## 2024-03-12 RX ADMIN — METOPROLOL TARTRATE 12.5 MG: 25 TABLET, FILM COATED ORAL at 21:29

## 2024-03-12 RX ADMIN — ATORVASTATIN CALCIUM 80 MG: 80 TABLET, FILM COATED ORAL at 21:29

## 2024-03-12 RX ADMIN — TICAGRELOR 90 MG: 90 TABLET ORAL at 09:50

## 2024-03-12 RX ADMIN — SODIUM CHLORIDE, PRESERVATIVE FREE 10 ML: 5 INJECTION INTRAVENOUS at 21:30

## 2024-03-12 RX ADMIN — PANTOPRAZOLE SODIUM 40 MG: 40 TABLET, DELAYED RELEASE ORAL at 09:53

## 2024-03-12 RX ADMIN — INSULIN GLARGINE 40 UNITS: 100 INJECTION, SOLUTION SUBCUTANEOUS at 21:29

## 2024-03-12 RX ADMIN — MIDODRINE HYDROCHLORIDE 10 MG: 5 TABLET ORAL at 17:15

## 2024-03-12 RX ADMIN — FUROSEMIDE 20 MG: 10 INJECTION, SOLUTION INTRAMUSCULAR; INTRAVENOUS at 09:48

## 2024-03-12 RX ADMIN — APIXABAN 5 MG: 5 TABLET, FILM COATED ORAL at 09:49

## 2024-03-12 RX ADMIN — APIXABAN 5 MG: 5 TABLET, FILM COATED ORAL at 21:29

## 2024-03-12 NOTE — PROGRESS NOTES
[facility]    Admission Medication Reconciliation       The patient's list of current home medications has been reviewed.     Source(s) of information: Dispense Report, PCP Clinical Summary     Based on information provided by the above source(s), I have updated the patient's home med list as described below.     Please review the ACTION REQUESTED section of this note below for any discrepancies on current hospital orders.      I changed or updated the following medications on the patient's home medication list:  Removed N/A   Added Amiodarone 200 mg Tablets  Directions: Take one tablet by mouth once daily.    Adjusted   Ozempic 0.25/0.5 mg/dose 2mg/3mL Solution   Added Directions: Inject 0.25 mL subcutaneously once weekly.    Other Notes N/A     Please feel free to call me with any questions about this encounter. Thank you.    Thank you  Brett Braun, PharmD, [facility]  Inpatient Clinical Pharmacist  [phone]      Electronically signed by Mayra Call on 3/12/2024 at 8:21 AM

## 2024-03-12 NOTE — PROGRESS NOTES
Report was given to [facility] AMBROCIO.  All belongings and files were hand over as well.  2115H- Patient transported to [facility] at room 2001, no untoward event while transferring.

## 2024-03-12 NOTE — H&P
[facility]     Department of Internal Medicine - Staff Internal Medicine Service   ICU PATIENT TRANSFER NOTE        Patient:  Kb Wilson  YOB: 1945  MRN: [id number]     Acct: [id number]     Admit date: 2/29/2024    Code Status:-  Full code     Reason for ICU Admission:-       SUPPORT DEVICES: [] Ventilator [x] BIPAP  [] Nasal Cannula [] Room Air    Consultations:- [] Cardiology [x] Nephrology  [] Hemo onco  [] GI                               [] ID [] ENT  [] Rheum [] Endo   []Physiotherapy                                 Others:-     NUTRITION:  [] NPO [] Tube Feeding (Specify: ) [] TPN  [x] PO    Central Lines:- [] No   [] Yes           If yes - Days/Date of Insertion.      Pt seen,examined and Chart reviewed.    ICU COURSE:        Patient is a 78-year-old male with history of CAD status post CABG 2008 on Plavix, ICM status post AICD, paroxysmal A-fib on Eliquis, type 2 diabetes mellitus, hypertension and CKD stage III who initially presented to the [place] ER on 2/27 for bright red blood when he wiped when going to the restroom.  CT abdomen pelvis without contrast suggested diverticulosis he was started on Protonix infusion.  Patient had EGD which was normal.  Later patient was given propofol for the colonoscopy after which patient had a cardiac arrest likely PEA/asystole a total of 3 times in the OR.  He is was intubated, started on vasopressors and transferred to ICU where he coded 2 more times.    He was transferred to [facility] for cardiac evaluation.    Before the cardiac arrest, patient was able to have some of the colonoscopy which revealed severe diverticulosis with no active bleed.  At that time GI recommended, if patient rebleeds then obtain CTA.  Patient did not have a bleed and patient's hemoglobin was stable, hence no further intervention done. On 3/4, patient underwent diagnostic heart cath which revealed left main and multivessel CAD.  Echo revealed  mg Oral BID    apixaban  5 mg Oral BID    lidocaine  1 patch TransDERmal Daily    insulin lispro  0-16 Units SubCUTAneous TID     insulin lispro  0-4 Units SubCUTAneous Nightly    atorvastatin  80 mg Oral Nightly    aspirin  81 mg Oral Daily    midodrine  10 mg Oral TID WC    sodium chloride flush  5-40 mL IntraVENous 2 times per day     Continuous Infusions:   sodium chloride      dextrose      sodium chloride Stopped (03/07/24 0520)     PRN Meds:sodium chloride flush, sodium chloride, glucose, dextrose bolus **OR** dextrose bolus, glucagon (rDNA), dextrose, benzonatate, sodium chloride flush, sodium chloride, potassium chloride **OR** potassium chloride, magnesium sulfate, ondansetron **OR** ondansetron, polyethylene glycol, acetaminophen **OR** acetaminophen    Objective:    CBC:   Recent Labs     03/10/24  0745 03/11/24  0355   WBC 8.4 9.0   HGB 8.3* 8.3*    260     BMP:    Recent Labs     03/10/24  0745 03/11/24  0355    138   K 4.9 3.8   CL 99 98   CO2 29 31   BUN 38* 31*   CREATININE 1.6* 1.4*   GLUCOSE 219* 135*     Calcium:  Recent Labs     03/11/24  0355   CALCIUM 7.7*     Ionized Calcium:No results for input(s): \"IONCA\" in the last 72 hours.  Magnesium:No results for input(s): \"MG\" in the last 72 hours.  Phosphorus:No results for input(s): \"PHOS\" in the last 72 hours.  BNP:No results for input(s): \"BNP\" in the last 72 hours.  Glucose:  Recent Labs     03/11/24  1133 03/11/24  1632 03/11/24 2012   POCGLU 189* 121* 157*     HgbA1C: No results for input(s): \"LABA1C\" in the last 72 hours.  INR: No results for input(s): \"INR\" in the last 72 hours.  Hepatic: No results for input(s): \"ALKPHOS\", \"ALT\", \"AST\", \"PROT\", \"BILITOT\", \"BILIDIR\", \"LABALBU\" in the last 72 hours.  Amylase and Lipase:No results for input(s): \"LACTA\", \"AMYLASE\" in the last 72 hours.  Lactic Acid: No results for input(s): \"LACTA\" in the last 72 hours.  CARDIAC ENZYMES:No results for input(s): \"CKTOTAL\", \"CKMB\", \"CKMBINDEX\",  \"TROPONINI\" in the last 72 hours.  BNP: No results for input(s): \"BNP\" in the last 72 hours.  Lipids: No results for input(s): \"CHOL\", \"TRIG\", \"HDL\", \"LDL\", \"LDLCALC\" in the last 72 hours.  ABGs: No results found for: \"PH\", \"PCO2\", \"PO2\", \"HCO3\", \"O2SAT\"  Thyroid:   Lab Results   Component Value Date/Time    TSH 2.96 02/23/2024 05:00 AM        Urinalysis: No results for input(s): \"BACTERIA\", \"BLOODU\", \"CLARITYU\", \"COLORU\", \"PHUR\", \"PROTEINU\", \"RBCUA\", \"SPECGRAV\", \"BILIRUBINUR\", \"NITRU\", \"WBCUA\", \"LEUKOCYTESUR\", \"GLUCOSEU\" in the last 72 hours.        Assessment:  Principal Problem:    Cardiac arrest ([facility])  Active Problems:    NSTEMI (non-ST elevated myocardial infarction) ([facility])    History of coronary angioplasty with insertion of stent    Atherosclerotic heart disease    AICD (automatic cardioverter/defibrillator) present    Diverticular hemorrhage    Acute blood loss anemia    ACS (acute coronary syndrome) ([facility])    CAD in native artery    CAD, multiple vessel    Arterial hypotension  Resolved Problems:    * No resolved hospital problems. *          Plan:    Acute hypoxic respiratory failure secondary to CHF exacerbation  - Alternating between BiPAP and 3-4 L NC oxygen  - CXR (3/5) - Pulmonary edema and congestion with B/L Effusion  - Continue IV Lasix 20 mg BID  - Monitor I/O  - Daily weights  - Ordered Repeat CXR    Lower GI bleed secondary to diverticulosis  - resolved  - Partial colonoscopy 2/29 - severe diverticulosis with no active bleeding  - Currently,no active bleed and Hb stable  - On po Protonix 40 mg BID  - GI signed off    MVCAD s/p PCI  Multiple Cardiac arrest PEA/Asystole  CAD s/p CABG (2008)   ICM s/p AICD  - Cardiac cath 3/4/24 which showed Left main and MVCAD. Only LIMA to LAD graft is patent. Occluded SVG to diagonal, OM2 and RPDA.  - Cardiac cath on 3/7:Successful PCI/shockwave/DAYTON to left main extending into proximal and mid LCx   - ECHO 3/4 - EF 20 to 25% with abnormal diastolic function

## 2024-03-12 NOTE — PROGRESS NOTES
Renal Progress Note    Patient :  Kb Wilson; 78 y.o. MRN# [id number]  Location:  2001/2001-01  Attending:  Viktor Mireles MD  Admit Date:  2/29/2024   Hospital Day: 12    Subjective:     Patient was seen and examined.  No new issues reported overnight.  BMP result from today reviewed sodium 140, potassium 4.3, chloride 98, bicarb 34, calcium 7.8, BUN 23, creatinine 1.5 mg/dl.  Urine output documented as about 1.5 L and x 4 more unmeasured in the last 24 hours.    Patient is currently on midodrine 10 mg 3 times a day.  On Lasix 20 mg IV twice daily.    Outpatient Medications:     Medications Prior to Admission: amiodarone (CORDARONE) 200 MG tablet, Take 1 tablet by mouth daily  carvedilol (COREG) 3.125 MG tablet, Take 1 tablet by mouth 2 times daily (with meals)  aspirin 81 MG chewable tablet, Take 1 tablet by mouth daily (Patient not taking: Reported on 2/27/2024)  atorvastatin (LIPITOR) 80 MG tablet, Take 1 tablet by mouth nightly  calcitRIOL (ROCALTROL) 0.25 MCG capsule, Take 1 capsule by mouth as needed Three times weekly  apixaban (ELIQUIS) 5 MG TABS tablet, Take 1 tablet by mouth 2 times daily  famotidine (PEPCID) 40 MG tablet, Take 1 tablet by mouth daily  OZEMPIC, 0.25 OR 0.5 MG/DOSE, 2 MG/3ML SOPN, Inject 0.25 mLs into the skin once a week  insulin glargine (LANTUS;BASAGLAR) 100 UNIT/ML injection pen, Inject 40 Units into the skin daily (with breakfast)  dapagliflozin (FARXIGA) 10 MG tablet, Take 1 tablet by mouth daily  losartan (COZAAR) 25 MG tablet, Take 1 tablet by mouth daily  mirtazapine (REMERON) 7.5 MG tablet, Take 1 tablet by mouth nightly  torsemide (DEMADEX) 20 MG tablet, Take 1 tablet by mouth daily    Current Medications:     Scheduled Meds:    pantoprazole  40 mg Oral BID AC    insulin glargine  40 Units SubCUTAneous Nightly    furosemide  20 mg IntraVENous Q12H    metoprolol tartrate  12.5 mg Oral BID    ticagrelor  90 mg Oral BID    apixaban  5 mg Oral BID    lidocaine  1 patch

## 2024-03-12 NOTE — PROGRESS NOTES
Occupational Therapy  Facility/Department: [facility] CAR 2- STEPDOWN  Occupational Therapy Daily Treatment Note    Name: Kb Wilson  : 1945  MRN: [id number]  Date of Service: 3/12/2024    Discharge Recommendations:  Patient would benefit from continued therapy after discharge  OT Equipment Recommendations  ADL Assistive Devices: Reacher;Sock-Aid Hard;Hand-held Shower;Long-handled Sponge       Patient Diagnosis(es): The primary encounter diagnosis was Cardiac arrest (HCC). Diagnoses of ACS (acute coronary syndrome) (HCC), CAD in native artery, and S/P primary angioplasty with coronary stent were also pertinent to this visit.  Past Medical History:  has a past medical history of CAD (coronary artery disease), CHF (congestive heart failure) (Former[facility]), Diabetes mellitus ([facility]), and Hypertension.  Past Surgical History:  has a past surgical history that includes Cardiac defibrillator placement; Coronary artery bypass graft; Upper gastrointestinal endoscopy (N/A, 2024); Colonoscopy (N/A, 2024); Cardiac procedure (N/A, 3/4/2024); and Cardiac procedure (N/A, 3/7/2024).           Assessment   Performance deficits / Impairments: Decreased functional mobility ;Decreased endurance;Decreased ADL status;Decreased strength;Decreased high-level IADLs;Decreased balance  Assessment: Pt completed log rolling for brief change/noble care, MOD A for sup>sit w/ assist for trunk progression, MIN>MOD A for STS transfers w/ RW from EOB/chair and CGA for stand pivot transfer. Pt limited by decreased endurance and pain in ribs at this time. Pt on 3L O2 NC for duration of session and requried frequent seated rest breaks when engaged in activity d/t decreased O2 sats. Pts O2 sats ranged from 77-96%, decreased when standing, increased when engaged in breathing exercise. Pt would benefit from continued OT to address above deficits.  Prognosis: Good  Activity Tolerance  Activity Tolerance: Patient limited by fatigue;Patient Tolerated  tolerance 15+ minutes with EC implemented PRN (w/out cues) to facilitate increase IND during functional activity       Therapy Time   Individual Concurrent Group Co-treatment   Time In 0934         Time Out 1030         Minutes 56         Timed Code Treatment Minutes: 53 Minutes       DHEERAJ Chandra/TROY

## 2024-03-12 NOTE — CARE COORDINATION
Met with patient to discuss transitional planning. He is agreeable to go to rehab d/t weakness and decreased mobility. SNF list provided

## 2024-03-12 NOTE — PROGRESS NOTES
[facility]  Internal Medicine Teaching Residency Program  Inpatient Daily Progress Note  ______________________________________________________________________________    Patient: Kb Wilson  YOB: 1945   MRN:[id number]    Acct: [id number]     Room: 2001/2001-01  Admit date: 2/29/2024  Today's date: 03/12/24  Number of days in the hospital: 12    SUBJECTIVE   Admitting Diagnosis: Cardiac arrest (HCC)  CC: Bright red blood per rectal  Pt examined at bedside. Chart & results reviewed.     - Patient was afebrile and hemodynamically stable  - Alternating between BiPAP and 3 L oxygen NC  - UO 1.5 L. On IV Lasix 20 mg BID. Nephro on board.  - Repeat CXR pending      BRIEF HISTORY     Patient is a 78-year-old male with history of CAD status post CABG 2008 on Plavix, ICM status post AICD, paroxysmal A-fib on Eliquis, type 2 diabetes mellitus, hypertension and CKD stage III who initially presented to the [place] ER on 2/27 for bright red blood when he wiped when going to the restroom.  CT abdomen pelvis without contrast suggested diverticulosis he was started on Protonix infusion.  Patient had EGD which was normal.  Later patient was given propofol for the colonoscopy after which patient had a cardiac arrest likely PEA/asystole a total of 3 times in the OR.  He is was intubated, started on vasopressors and transferred to ICU where he coded 2 more times.     He was transferred to [facility] for cardiac evaluation.     Before the cardiac arrest, patient was able to have some of the colonoscopy which revealed severe diverticulosis with no active bleed.  At that time GI recommended, if patient rebleeds then obtain CTA.  Patient did not have a bleed and patient's hemoglobin was stable, hence no further intervention done. On 3/4, patient underwent diagnostic heart cath which revealed left main and multivessel CAD.  Echo revealed ejection fraction of 20 to 25% with    JACKIE  - On CPAP at home    Shayla Shi MD  Internal Medicine Resident, PGY-1  [facility]; [place], [place]  3/12/2024, 11:34 AM  Attending Physician Statement  I have discussed the care of Kb Wilson, including pertinent history and exam findings,  with the resident. I have seen and examined the patient and the key elements of all parts of the encounter have been performed by me.  I agree with the assessment, plan and orders as documented by the resident with additions .  Doing well.  Discharge process being arranged..  Will arrange for him to have a CPAP titration as outpatient.    Treatment plan Discussed with nursing staff in detail , all questions answered .   Electronically signed by Viktor Mireles MD on   3/12/24 at 12:38 PM EDT  CC time 30 minutes  Please note that this chart was generated using voice recognition Dragon dictation software.  Although every effort was made to ensure the accuracy of this automated transcription, some errors in transcription may have occurred.

## 2024-03-12 NOTE — PLAN OF CARE
Problem: Chronic Conditions and Co-morbidities  Goal: Patient's chronic conditions and co-morbidity symptoms are monitored and maintained or improved  3/11/2024 2103 by Dimitrios Wallace RN  Outcome: Progressing  3/11/2024 0911 by Ghassan Echols RN  Outcome: Progressing     Problem: Safety - Adult  Goal: Free from fall injury  3/11/2024 2103 by Dimitrios Wallace RN  Outcome: Progressing  3/11/2024 0911 by Ghassan Echols RN  Outcome: Progressing  Flowsheets (Taken 3/11/2024 0910)  Free From Fall Injury: Instruct family/caregiver on patient safety     Problem: Discharge Planning  Goal: Discharge to home or other facility with appropriate resources  3/11/2024 2103 by Dimitrios Wallace RN  Outcome: Progressing  3/11/2024 0911 by Ghassan Echols RN  Outcome: Progressing     Problem: Respiratory - Adult  Goal: Achieves optimal ventilation and oxygenation  3/11/2024 2103 by Dimitrios Wallace RN  Outcome: Progressing  3/11/2024 0911 by Ghassan Echols RN  Outcome: Progressing     Problem: Pain  Goal: Verbalizes/displays adequate comfort level or baseline comfort level  3/11/2024 2103 by Dimitrios Wallace RN  Outcome: Progressing  3/11/2024 0911 by Ghassan Echols RN  Outcome: Progressing     Problem: Skin/Tissue Integrity  Goal: Absence of new skin breakdown  Description: 1.  Monitor for areas of redness and/or skin breakdown  2.  Assess vascular access sites hourly  3.  Every 4-6 hours minimum:  Change oxygen saturation probe site  4.  Every 4-6 hours:  If on nasal continuous positive airway pressure, respiratory therapy assess nares and determine need for appliance change or resting period.  3/11/2024 2103 by Dimitrios Wallace RN  Outcome: Progressing  3/11/2024 0911 by Ghassan Echols RN  Outcome: Progressing     Problem: ABCDS Injury Assessment  Goal: Absence of physical injury  3/11/2024 2103 by Dimitrios Wallace RN  Outcome: Progressing  3/11/2024 0911 by Ghassna Echols RN  Outcome:  Progressing  Flowsheets (Taken 3/11/2024 0910)  Absence of Physical Injury: Implement safety measures based on patient assessment     Problem: Nutrition Deficit:  Goal: Optimize nutritional status  3/11/2024 0911 by Ghassan Echols, RN  Outcome: Progressing

## 2024-03-12 NOTE — PROGRESS NOTES
Congestive Heart Failure Education completed and charted. CHF booklet given. Patient was receptive to education.    Discussed the  importance of medication compliance.    Discussed the importance of a heart healthy diet. Discussed 2000 mg sodium-restricted daily diet.  Patient instructed to limit fluid intake to  1.5 to 2 liters per day.    Patient instructed to weigh self at the same time of each day each morning, reinforced teaching to monitor for 3-5 lb weight increase over 1-2 days notify physician if change noted.      Signs and symptoms of CHF discussed with patient, such as feeling more tired than normal, feeling short of breath, coughing that increases when lying down, sudden weight gain, swelling of the feet, legs or belly.  Patient verbalized understanding to notify physician office if these symptoms occur.  EF 20-25%   Diastolic Dysfunction   Addendum  3/14/24: again discuss referral with pt and he declines. CHF Clinic phone number given to pt in the event pt changes his mind, and would like to schedule an OP appt with the  CHF Clinic. He understands this referral can occur easily through his Cardiologist or any provider.

## 2024-03-12 NOTE — PLAN OF CARE
Problem: Chronic Conditions and Co-morbidities  Goal: Patient's chronic conditions and co-morbidity symptoms are monitored and maintained or improved  3/11/2024 2214 by Cece Velazquez RN  Outcome: Progressing  3/11/2024 2103 by Dimitrios Wallace RN  Outcome: Progressing  3/11/2024 0911 by Ghassan Echols RN  Outcome: Progressing     Problem: Safety - Adult  Goal: Free from fall injury  3/11/2024 2214 by Cece Velazquez RN  Outcome: Progressing  3/11/2024 2103 by Dimitrios Wallace RN  Outcome: Progressing  3/11/2024 0911 by Ghassan Echols RN  Outcome: Progressing  Flowsheets (Taken 3/11/2024 0910)  Free From Fall Injury: Instruct family/caregiver on patient safety     Problem: Discharge Planning  Goal: Discharge to home or other facility with appropriate resources  3/11/2024 2214 by Cece Velazquez RN  Outcome: Progressing  3/11/2024 2103 by Dimitrios Wallace RN  Outcome: Progressing  3/11/2024 0911 by Ghassan Echols RN  Outcome: Progressing     Problem: Respiratory - Adult  Goal: Achieves optimal ventilation and oxygenation  3/11/2024 2214 by Cece Velazquez RN  Outcome: Progressing  3/11/2024 2103 by Dimitrios Wallace RN  Outcome: Progressing  3/11/2024 0911 by Ghassan Echols RN  Outcome: Progressing     Problem: Pain  Goal: Verbalizes/displays adequate comfort level or baseline comfort level  3/11/2024 2214 by Cece Velazquez RN  Outcome: Progressing  3/11/2024 2103 by Dimitrios Wallace RN  Outcome: Progressing  3/11/2024 0911 by Ghassan Echols RN  Outcome: Progressing     Problem: Skin/Tissue Integrity  Goal: Absence of new skin breakdown  Description: 1.  Monitor for areas of redness and/or skin breakdown  2.  Assess vascular access sites hourly  3.  Every 4-6 hours minimum:  Change oxygen saturation probe site  4.  Every 4-6 hours:  If on nasal continuous positive airway pressure, respiratory therapy assess nares and determine need for appliance change or resting period.  3/11/2024 2214 by Marcus

## 2024-03-12 NOTE — PLAN OF CARE
Problem: Safety - Adult  Goal: Free from fall injury  Outcome: Progressing     Problem: Discharge Planning  Goal: Discharge to home or other facility with appropriate resources  Outcome: Progressing     Problem: Respiratory - Adult  Goal: Achieves optimal ventilation and oxygenation  Outcome: Progressing     Problem: Pain  Goal: Verbalizes/displays adequate comfort level or baseline comfort level  Outcome: Progressing

## 2024-03-13 PROBLEM — Z95.5 S/P PRIMARY ANGIOPLASTY WITH CORONARY STENT: Status: ACTIVE | Noted: 2024-03-13

## 2024-03-13 LAB
ANION GAP SERPL CALCULATED.3IONS-SCNC: 8 MMOL/L (ref 9–16)
BASOPHILS # BLD: <0.03 K/UL (ref 0–0.2)
BASOPHILS NFR BLD: 0 % (ref 0–2)
BUN SERPL-MCNC: 24 MG/DL (ref 8–23)
CALCIUM SERPL-MCNC: 7.5 MG/DL (ref 8.6–10.4)
CHLORIDE SERPL-SCNC: 99 MMOL/L (ref 98–107)
CO2 SERPL-SCNC: 33 MMOL/L (ref 20–31)
CREAT SERPL-MCNC: 1.6 MG/DL (ref 0.7–1.2)
EOSINOPHIL # BLD: 0.12 K/UL (ref 0–0.44)
EOSINOPHILS RELATIVE PERCENT: 2 % (ref 1–4)
ERYTHROCYTE [DISTWIDTH] IN BLOOD BY AUTOMATED COUNT: 18.9 % (ref 11.8–14.4)
GFR SERPL CREATININE-BSD FRML MDRD: 43 ML/MIN/1.73M2
GLUCOSE BLD-MCNC: 158 MG/DL (ref 75–110)
GLUCOSE BLD-MCNC: 174 MG/DL (ref 75–110)
GLUCOSE BLD-MCNC: 189 MG/DL (ref 75–110)
GLUCOSE BLD-MCNC: 206 MG/DL (ref 75–110)
GLUCOSE SERPL-MCNC: 194 MG/DL (ref 74–99)
HCT VFR BLD AUTO: 27.4 % (ref 40.7–50.3)
HGB BLD-MCNC: 8.1 G/DL (ref 13–17)
IMM GRANULOCYTES # BLD AUTO: 0.06 K/UL (ref 0–0.3)
IMM GRANULOCYTES NFR BLD: 1 %
LYMPHOCYTES NFR BLD: 1.26 K/UL (ref 1.1–3.7)
LYMPHOCYTES RELATIVE PERCENT: 16 % (ref 24–43)
MCH RBC QN AUTO: 27.4 PG (ref 25.2–33.5)
MCHC RBC AUTO-ENTMCNC: 29.6 G/DL (ref 28.4–34.8)
MCV RBC AUTO: 92.6 FL (ref 82.6–102.9)
MONOCYTES NFR BLD: 0.56 K/UL (ref 0.1–1.2)
MONOCYTES NFR BLD: 7 % (ref 3–12)
NEUTROPHILS NFR BLD: 74 % (ref 36–65)
NEUTS SEG NFR BLD: 5.78 K/UL (ref 1.5–8.1)
NRBC BLD-RTO: 0 PER 100 WBC
PLATELET # BLD AUTO: 253 K/UL (ref 138–453)
PMV BLD AUTO: 10.6 FL (ref 8.1–13.5)
POTASSIUM SERPL-SCNC: 4.3 MMOL/L (ref 3.7–5.3)
RBC # BLD AUTO: 2.96 M/UL (ref 4.21–5.77)
RBC # BLD: ABNORMAL 10*6/UL
SODIUM SERPL-SCNC: 140 MMOL/L (ref 136–145)
WBC OTHER # BLD: 7.8 K/UL (ref 3.5–11.3)

## 2024-03-13 PROCEDURE — 6370000000 HC RX 637 (ALT 250 FOR IP)

## 2024-03-13 PROCEDURE — 6370000000 HC RX 637 (ALT 250 FOR IP): Performed by: STUDENT IN AN ORGANIZED HEALTH CARE EDUCATION/TRAINING PROGRAM

## 2024-03-13 PROCEDURE — 80048 BASIC METABOLIC PNL TOTAL CA: CPT

## 2024-03-13 PROCEDURE — 6370000000 HC RX 637 (ALT 250 FOR IP): Performed by: INTERNAL MEDICINE

## 2024-03-13 PROCEDURE — 2060000000 HC ICU INTERMEDIATE R&B

## 2024-03-13 PROCEDURE — 2580000003 HC RX 258

## 2024-03-13 PROCEDURE — 85025 COMPLETE CBC W/AUTO DIFF WBC: CPT

## 2024-03-13 PROCEDURE — 94660 CPAP INITIATION&MGMT: CPT

## 2024-03-13 PROCEDURE — 36415 COLL VENOUS BLD VENIPUNCTURE: CPT

## 2024-03-13 PROCEDURE — 2700000000 HC OXYGEN THERAPY PER DAY

## 2024-03-13 PROCEDURE — 82947 ASSAY GLUCOSE BLOOD QUANT: CPT

## 2024-03-13 PROCEDURE — 94761 N-INVAS EAR/PLS OXIMETRY MLT: CPT

## 2024-03-13 RX ORDER — ASPIRIN 81 MG/1
81 TABLET, CHEWABLE ORAL DAILY
Qty: 30 TABLET | Refills: 0 | Status: SHIPPED | OUTPATIENT
Start: 2024-03-13

## 2024-03-13 RX ORDER — OXYMETAZOLINE HYDROCHLORIDE 0.05 G/100ML
2 SPRAY NASAL 2 TIMES DAILY
Status: COMPLETED | OUTPATIENT
Start: 2024-03-13 | End: 2024-03-14

## 2024-03-13 RX ORDER — PANTOPRAZOLE SODIUM 40 MG/1
40 TABLET, DELAYED RELEASE ORAL
Qty: 30 TABLET | Refills: 3 | Status: SHIPPED | OUTPATIENT
Start: 2024-03-13

## 2024-03-13 RX ORDER — FUROSEMIDE 40 MG/1
40 TABLET ORAL 2 TIMES DAILY
Qty: 60 TABLET | Refills: 3 | Status: SHIPPED | OUTPATIENT
Start: 2024-03-13

## 2024-03-13 RX ORDER — LIDOCAINE 4 G/G
1 PATCH TOPICAL DAILY
Qty: 20 EACH | Refills: 0 | Status: SHIPPED | OUTPATIENT
Start: 2024-03-14

## 2024-03-13 RX ORDER — MIDODRINE HYDROCHLORIDE 10 MG/1
10 TABLET ORAL
Qty: 90 TABLET | Refills: 3 | Status: SHIPPED | OUTPATIENT
Start: 2024-03-13

## 2024-03-13 RX ADMIN — INSULIN LISPRO 4 UNITS: 100 INJECTION, SOLUTION INTRAVENOUS; SUBCUTANEOUS at 12:47

## 2024-03-13 RX ADMIN — ASPIRIN 81 MG 81 MG: 81 TABLET ORAL at 08:09

## 2024-03-13 RX ADMIN — TICAGRELOR 90 MG: 90 TABLET ORAL at 08:13

## 2024-03-13 RX ADMIN — PANTOPRAZOLE SODIUM 40 MG: 40 TABLET, DELAYED RELEASE ORAL at 18:15

## 2024-03-13 RX ADMIN — MIDODRINE HYDROCHLORIDE 10 MG: 5 TABLET ORAL at 12:47

## 2024-03-13 RX ADMIN — ACETAMINOPHEN 650 MG: 325 TABLET ORAL at 13:14

## 2024-03-13 RX ADMIN — METOPROLOL TARTRATE 12.5 MG: 25 TABLET, FILM COATED ORAL at 08:08

## 2024-03-13 RX ADMIN — ATORVASTATIN CALCIUM 80 MG: 80 TABLET, FILM COATED ORAL at 21:19

## 2024-03-13 RX ADMIN — Medication 2 SPRAY: at 22:22

## 2024-03-13 RX ADMIN — FUROSEMIDE 40 MG: 40 TABLET ORAL at 18:15

## 2024-03-13 RX ADMIN — TICAGRELOR 90 MG: 90 TABLET ORAL at 21:19

## 2024-03-13 RX ADMIN — APIXABAN 5 MG: 5 TABLET, FILM COATED ORAL at 21:19

## 2024-03-13 RX ADMIN — APIXABAN 5 MG: 5 TABLET, FILM COATED ORAL at 09:04

## 2024-03-13 RX ADMIN — INSULIN GLARGINE 40 UNITS: 100 INJECTION, SOLUTION SUBCUTANEOUS at 21:19

## 2024-03-13 RX ADMIN — SODIUM CHLORIDE, PRESERVATIVE FREE 10 ML: 5 INJECTION INTRAVENOUS at 21:19

## 2024-03-13 RX ADMIN — MIDODRINE HYDROCHLORIDE 10 MG: 5 TABLET ORAL at 08:10

## 2024-03-13 RX ADMIN — PANTOPRAZOLE SODIUM 40 MG: 40 TABLET, DELAYED RELEASE ORAL at 08:13

## 2024-03-13 RX ADMIN — METOPROLOL TARTRATE 12.5 MG: 25 TABLET, FILM COATED ORAL at 21:19

## 2024-03-13 RX ADMIN — SODIUM CHLORIDE, PRESERVATIVE FREE 10 ML: 5 INJECTION INTRAVENOUS at 08:17

## 2024-03-13 RX ADMIN — FUROSEMIDE 40 MG: 40 TABLET ORAL at 08:09

## 2024-03-13 RX ADMIN — MIDODRINE HYDROCHLORIDE 10 MG: 5 TABLET ORAL at 18:15

## 2024-03-13 ASSESSMENT — PAIN SCALES - GENERAL
PAINLEVEL_OUTOF10: 3
PAINLEVEL_OUTOF10: 0
PAINLEVEL_OUTOF10: 0

## 2024-03-13 NOTE — CARE COORDINATION
Transitional planning    Spoke to patient about plan for discharge. Plan is for SNF.  First choice is [facility]. Referral sent. CM asked for him to look at list for back up choices.     [phone] left message requesting return phone call.   [phone], no answer.

## 2024-03-13 NOTE — PROGRESS NOTES
Renal Progress Note    Patient :  Kb Wilson; 78 y.o. MRN# [id number]  Location:  2001/2001-01  Attending:  Brenden Pedersen MD  Admit Date:  2/29/2024   Hospital Day: 13    Subjective:     Patient was seen and examined.  Patient is hemodynamically stable.  He continues to have chest pain after CPR.  His diuresis is going well.  His weight is now down to 124.6 after reaching a peak of 138.3.  Patient is on IV Lasix twice a day    Outpatient Medications:     Medications Prior to Admission: [DISCONTINUED] amiodarone (CORDARONE) 200 MG tablet, Take 1 tablet by mouth daily  atorvastatin (LIPITOR) 80 MG tablet, Take 1 tablet by mouth nightly  [DISCONTINUED] carvedilol (COREG) 3.125 MG tablet, Take 1 tablet by mouth 2 times daily (with meals)  [DISCONTINUED] aspirin 81 MG chewable tablet, Take 1 tablet by mouth daily (Patient not taking: Reported on 2/27/2024)  calcitRIOL (ROCALTROL) 0.25 MCG capsule, Take 1 capsule by mouth as needed Three times weekly  apixaban (ELIQUIS) 5 MG TABS tablet, Take 1 tablet by mouth 2 times daily  OZEMPIC, 0.25 OR 0.5 MG/DOSE, 2 MG/3ML SOPN, Inject 0.25 mLs into the skin once a week  insulin glargine (LANTUS;BASAGLAR) 100 UNIT/ML injection pen, Inject 40 Units into the skin daily (with breakfast)  dapagliflozin (FARXIGA) 10 MG tablet, Take 1 tablet by mouth daily  mirtazapine (REMERON) 7.5 MG tablet, Take 1 tablet by mouth nightly  [DISCONTINUED] famotidine (PEPCID) 40 MG tablet, Take 1 tablet by mouth daily  [DISCONTINUED] losartan (COZAAR) 25 MG tablet, Take 1 tablet by mouth daily  [DISCONTINUED] torsemide (DEMADEX) 20 MG tablet, Take 1 tablet by mouth daily    Current Medications:     Scheduled Meds:    furosemide  40 mg Oral BID    pantoprazole  40 mg Oral BID AC    insulin glargine  40 Units SubCUTAneous Nightly    metoprolol tartrate  12.5 mg Oral BID    ticagrelor  90 mg Oral BID    apixaban  5 mg Oral BID    lidocaine  1 patch TransDERmal Daily    insulin lispro  0-16 Units  Avoid nephrotoxic drugs/contrast exposure.  Brady Lea MD  [facility]     This note is created with the assistance of a speech-recognition program. While intending to generate a document that actually reflects the content of the visit, no guarantees can be provided that every mistake has been identified and corrected by editing.

## 2024-03-13 NOTE — PROGRESS NOTES
[facility]  Internal Medicine Teaching Residency Program  Inpatient Daily Progress Note  ______________________________________________________________________________    Patient: Kb Wilson  YOB: 1945   MRN:[id number]    Acct: [id number]     Room: 2001/2001-01  Admit date: 2/29/2024  Today's date: 03/13/24  Number of days in the hospital: 13    SUBJECTIVE   Admitting Diagnosis: Cardiac arrest (HCC)  CC: Bright red blood per rectal  Pt examined at bedside. Chart & results reviewed.     - Patient was afebrile and hemodynamically stable  - Alternating between BiPAP and 3 L oxygen NC  - UO 1.7 L. On po Lasix 40 mg BID.   - CXR revealed improved congestion.  - Hemoglobin stable.      BRIEF HISTORY     Patient is a 78-year-old male with history of CAD status post CABG 2008 on Plavix, ICM status post AICD, paroxysmal A-fib on Eliquis, type 2 diabetes mellitus, hypertension and CKD stage III who initially presented to the [place] ER on 2/27 for bright red blood when he wiped when going to the restroom.  CT abdomen pelvis without contrast suggested diverticulosis he was started on Protonix infusion.  Patient had EGD which was normal.  Later patient was given propofol for the colonoscopy after which patient had a cardiac arrest likely PEA/asystole a total of 3 times in the OR.  He is was intubated, started on vasopressors and transferred to ICU where he coded 2 more times.     He was transferred to [facility] for cardiac evaluation.     Before the cardiac arrest, patient was able to have some of the colonoscopy which revealed severe diverticulosis with no active bleed.  At that time GI recommended, if patient rebleeds then obtain CTA.  Patient did not have a bleed and patient's hemoglobin was stable, hence no further intervention done. On 3/4, patient underwent diagnostic heart cath which revealed left main and multivessel CAD.  Echo revealed ejection    JACKIE  - On CPAP at home    Shayla Shi MD  Internal Medicine Resident, PGY-1  [facility]; [place], [place]  3/13/2024, 9:51 AM

## 2024-03-13 NOTE — DISCHARGE INSTR - COC
Continuity of Care Form    Patient Name: Kb Fernández   :  1945  MRN:  [id number]    Admit date:  2024  Discharge date:  3/15/24    Code Status Order: Full Code   Advance Directives:     Admitting Physician:  Judson Cortés MD  PCP: Viridiana Taveras    Discharging Nurse: Trudy AMBROCIO  Discharging Hospital Unit/Room#:   Discharging Unit Phone Number: [phone]    Emergency Contact:   Extended Emergency Contact Information  Primary Emergency Contact: Jarrett Fernández  Mobile Phone: [phone]  Relation: Other   needed? No  Secondary Emergency Contact: ana maría fernández  Home Phone: [phone]  Relation: Child   needed? No    Past Surgical History:  Past Surgical History:   Procedure Laterality Date    CARDIAC DEFIBRILLATOR PLACEMENT      CARDIAC PROCEDURE N/A 3/4/2024    sharee / Left heart cath / rm [id number] performed by Wendy Moreno MD at [facility] CARDIAC CATH LAB    CARDIAC PROCEDURE N/A 3/7/2024    sharee / Percutaneous coronary intervention / rm 3003 performed by Wendy Moreno MD at [facility] CARDIAC CATH LAB    COLONOSCOPY N/A 2024    COLONOSCOPY DIAGNOSTIC ABORTED performed by Airam Olivia MD at [facility] OR    CORONARY ARTERY BYPASS GRAFT      UPPER GASTROINTESTINAL ENDOSCOPY N/A 2024    ESOPHAGOGASTRODUODENOSCOPY BIOPSY performed by Chas Molina MD at [facility] OR       Immunization History:   Immunization History   Administered Date(s) Administered    COVID-19, MODERNA, ( formula), (age 12y+), IM, 50mcg/0.5mL 2023       Active Problems:  Patient Active Problem List   Diagnosis Code    Fall W19.XXXA    Hypertension I10    Diabetes mellitus (HCC) E11.9    CHF (congestive heart failure) (HCC) I50.9    Atherosclerotic heart disease I25.10    Elevated troponin R79.89    Ischemic cardiomyopathy I25.5    AICD (automatic cardioverter/defibrillator) present Z95.810    GI bleed K92.2    Rectal bleeding K62.5    Acute anemia D64.9     03/13/24 0400   Ariana-wound Assessment Intact 03/13/24 0400   Number of days: 6        Elimination:  Continence:   Bowel: Yes  Bladder: Yes  Urinary Catheter: None   Colostomy/Ileostomy/Ileal Conduit: No       Date of Last BM: 3/15/24    Intake/Output Summary (Last 24 hours) at 3/13/2024 1130  Last data filed at 3/13/2024 0445  Gross per 24 hour   Intake 360 ml   Output 1700 ml   Net -1340 ml     I/O last 3 completed shifts:  In: 360 [P.O.:360]  Out: 2400 [Urine:2400]    Safety Concerns:     At Risk for Falls    Impairments/Disabilities:      None    Nutrition Therapy:  Current Nutrition Therapy:   - Oral Diet:  Carb Control 4 carbs/meal (1800kcals/day) and Cardiac    Routes of Feeding: Oral  Liquids: Thin Liquids  Daily Fluid Restriction: no  Last Modified Barium Swallow with Video (Video Swallowing Test): not done    Treatments at the Time of Hospital Discharge:   Respiratory Treatments: n/a  Oxygen Therapy:   Pt wearing 2-3 L NC in hospital and at discharge  Ventilator:    - No ventilator support    Rehab Therapies: Physical Therapy and Occupational Therapy  Weight Bearing Status/Restrictions: No weight bearing restrictions  Other Medical Equipment (for information only, NOT a DME order):  walker  Other Treatments: n/a    Patient's personal belongings (please select all that are sent with patient):  Glasses, pants, shirt, books, phone,     RN SIGNATURE:  Electronically signed by Trudy Ellis RN on 3/15/24 at 12:57 PM EDT    CASE MANAGEMENT/SOCIAL WORK SECTION    Inpatient Status Date: ***    Readmission Risk Assessment Score:  Readmission Risk              Risk of Unplanned Readmission:  31           Discharging to Facility/ Agency   Name: [facility]    / signature: Electronically signed by Tati Velasco RN on 3/15/24 at 1:11 PM EDT    PHYSICIAN SECTION    Prognosis: Fair    Condition at Discharge: Stable    Rehab Potential (if transferring to Rehab): Fair    Recommended Labs

## 2024-03-13 NOTE — PLAN OF CARE
Problem: Chronic Conditions and Co-morbidities  Goal: Patient's chronic conditions and co-morbidity symptoms are monitored and maintained or improved  Outcome: Progressing  Flowsheets (Taken 3/13/2024 0800)  Care Plan - Patient's Chronic Conditions and Co-Morbidity Symptoms are Monitored and Maintained or Improved:   Monitor and assess patient's chronic conditions and comorbid symptoms for stability, deterioration, or improvement   Collaborate with multidisciplinary team to address chronic and comorbid conditions and prevent exacerbation or deterioration     Problem: Safety - Adult  Goal: Free from fall injury  Outcome: Progressing  Flowsheets (Taken 3/13/2024 0900)  Free From Fall Injury: Instruct family/caregiver on patient safety     Problem: Discharge Planning  Goal: Discharge to home or other facility with appropriate resources  Outcome: Progressing  Flowsheets (Taken 3/13/2024 0800)  Discharge to home or other facility with appropriate resources:   Identify barriers to discharge with patient and caregiver   Arrange for needed discharge resources and transportation as appropriate   Identify discharge learning needs (meds, wound care, etc)     Problem: Respiratory - Adult  Goal: Achieves optimal ventilation and oxygenation  Outcome: Progressing  Flowsheets (Taken 3/13/2024 0800)  Achieves optimal ventilation and oxygenation:   Assess for changes in respiratory status   Assess for changes in mentation and behavior   Position to facilitate oxygenation and minimize respiratory effort   Oxygen supplementation based on oxygen saturation or arterial blood gases   Encourage broncho-pulmonary hygiene including cough, deep breathe, incentive spirometry     Problem: Pain  Goal: Verbalizes/displays adequate comfort level or baseline comfort level  Outcome: Progressing     Problem: Skin/Tissue Integrity  Goal: Absence of new skin breakdown  Description: 1.  Monitor for areas of redness and/or skin breakdown  2.  Assess

## 2024-03-13 NOTE — PROGRESS NOTES
Comprehensive Nutrition Assessment    Type and Reason for Visit:  Reassess    Nutrition Recommendations/Plan:   Continue current diet  Monitor intakes, weight, labs, GI status, fluid status.     Malnutrition Assessment:  Malnutrition Status:  At risk for malnutrition (Comment) (03/07/24 1530)    Context:  Acute Illness     Findings of the 6 clinical characteristics of malnutrition:  Energy Intake:  75% or less of estimated energy requirements for 7 or more days  Weight Loss:  No significant weight loss     Body Fat Loss:  No significant body fat loss     Muscle Mass Loss:  No significant muscle mass loss    Fluid Accumulation:  Mild (to Moderate) Generalized, Extremities   Strength:  Not Performed    Nutrition Assessment:    Pt states intakes are good, noted typically 51-75% of meals with some variability. Denies need for ONS. Discussed protein intake at meals. Glucose improving 194-174 mg/dL. LBM 3/11. Noted weight flux, likely? R/t fluid.    Nutrition Related Findings:    Meds/labs reviewed Wound Type: Diabetic Ulcer       Current Nutrition Intake & Therapies:    Average Meal Intake: 26-50%, 51-75%, %  Average Supplements Intake: None Ordered  ADULT DIET; Regular; 4 carb choices (60 gm/meal); Low Fat/Low Chol/High Fiber/2 gm Na  Additional Calorie Sources:  None    Anthropometric Measures:  Height: 190.5 cm (6' 3\")  Ideal Body Weight (IBW): 196 lbs (89 kg)    Admission Body Weight: 125.5 kg (276 lb 10.8 oz)  Current Body Weight: 124.6 kg (274 lb 11.1 oz), 140.1 % IBW. Weight Source: Bed Scale  Current BMI (kg/m2): 34.3  Usual Body Weight: 123.4 kg (272 lb 1 oz) (2/22/24 bed scale per chart review)  % Weight Change (Calculated): 4.5  Weight Adjustment For: No Adjustment  BMI Categories: Obese Class 1 (BMI 30.0-34.9)    Estimated Daily Nutrient Needs:  Energy Requirements Based On: Formula  Weight Used for Energy Requirements: Current  Energy (kcal/day): 9304-7577 kcals/day  Weight Used for Protein  Requirements: Ideal  Protein (g/day): 100-130 gm pro/day  Method Used for Fluid Requirements: Other (Comment)  Fluid (ml/day): per MD    Nutrition Diagnosis:   Inadequate oral intake related to  (GI bleed; current condition) as evidenced by  (recent start of oral diet; variable intakes during admission)    Nutrition Interventions:   Food and/or Nutrient Delivery: Continue Current Diet  Nutrition Education/Counseling: No recommendation at this time  Coordination of Nutrition Care: Continue to monitor while inpatient  Plan of Care discussed with: Pt    Goals:  Previous Goal Met: Progressing toward Goal(s)  Goals: PO intake 75% or greater, prior to discharge       Nutrition Monitoring and Evaluation:   Behavioral-Environmental Outcomes: None Identified  Food/Nutrient Intake Outcomes: Food and Nutrient Intake  Physical Signs/Symptoms Outcomes: Biochemical Data, Nutrition Focused Physical Findings, Weight, GI Status, Fluid Status or Edema    Discharge Planning:    Recommend pursue outpatient nutrition counseling     Juliana Agee RD  Contact: [phone]

## 2024-03-14 LAB
ANION GAP SERPL CALCULATED.3IONS-SCNC: 9 MMOL/L (ref 9–16)
BASOPHILS # BLD: <0.03 K/UL (ref 0–0.2)
BASOPHILS NFR BLD: 0 % (ref 0–2)
BUN SERPL-MCNC: 23 MG/DL (ref 8–23)
CALCIUM SERPL-MCNC: 7.9 MG/DL (ref 8.6–10.4)
CHLORIDE SERPL-SCNC: 97 MMOL/L (ref 98–107)
CO2 SERPL-SCNC: 34 MMOL/L (ref 20–31)
CREAT SERPL-MCNC: 1.7 MG/DL (ref 0.7–1.2)
EOSINOPHIL # BLD: 0.13 K/UL (ref 0–0.44)
EOSINOPHILS RELATIVE PERCENT: 2 % (ref 1–4)
ERYTHROCYTE [DISTWIDTH] IN BLOOD BY AUTOMATED COUNT: 18.9 % (ref 11.8–14.4)
GFR SERPL CREATININE-BSD FRML MDRD: 43 ML/MIN/1.73M2
GLUCOSE BLD-MCNC: 154 MG/DL (ref 75–110)
GLUCOSE BLD-MCNC: 156 MG/DL (ref 75–110)
GLUCOSE BLD-MCNC: 228 MG/DL (ref 75–110)
GLUCOSE BLD-MCNC: 271 MG/DL (ref 75–110)
GLUCOSE SERPL-MCNC: 174 MG/DL (ref 74–99)
HCT VFR BLD AUTO: 28.9 % (ref 40.7–50.3)
HGB BLD-MCNC: 8.5 G/DL (ref 13–17)
IMM GRANULOCYTES # BLD AUTO: 0.06 K/UL (ref 0–0.3)
IMM GRANULOCYTES NFR BLD: 1 %
LYMPHOCYTES NFR BLD: 1.5 K/UL (ref 1.1–3.7)
LYMPHOCYTES RELATIVE PERCENT: 18 % (ref 24–43)
MCH RBC QN AUTO: 27.4 PG (ref 25.2–33.5)
MCHC RBC AUTO-ENTMCNC: 29.4 G/DL (ref 28.4–34.8)
MCV RBC AUTO: 93.2 FL (ref 82.6–102.9)
MONOCYTES NFR BLD: 0.58 K/UL (ref 0.1–1.2)
MONOCYTES NFR BLD: 7 % (ref 3–12)
NEUTROPHILS NFR BLD: 72 % (ref 36–65)
NEUTS SEG NFR BLD: 5.98 K/UL (ref 1.5–8.1)
NRBC BLD-RTO: 0 PER 100 WBC
PLATELET # BLD AUTO: 257 K/UL (ref 138–453)
PMV BLD AUTO: 10.6 FL (ref 8.1–13.5)
POTASSIUM SERPL-SCNC: 3.9 MMOL/L (ref 3.7–5.3)
RBC # BLD AUTO: 3.1 M/UL (ref 4.21–5.77)
RBC # BLD: ABNORMAL 10*6/UL
SODIUM SERPL-SCNC: 140 MMOL/L (ref 136–145)
WBC OTHER # BLD: 8.3 K/UL (ref 3.5–11.3)

## 2024-03-14 PROCEDURE — 97530 THERAPEUTIC ACTIVITIES: CPT

## 2024-03-14 PROCEDURE — 6370000000 HC RX 637 (ALT 250 FOR IP)

## 2024-03-14 PROCEDURE — 6370000000 HC RX 637 (ALT 250 FOR IP): Performed by: STUDENT IN AN ORGANIZED HEALTH CARE EDUCATION/TRAINING PROGRAM

## 2024-03-14 PROCEDURE — 6370000000 HC RX 637 (ALT 250 FOR IP): Performed by: INTERNAL MEDICINE

## 2024-03-14 PROCEDURE — 2060000000 HC ICU INTERMEDIATE R&B

## 2024-03-14 PROCEDURE — 2700000000 HC OXYGEN THERAPY PER DAY

## 2024-03-14 PROCEDURE — 97110 THERAPEUTIC EXERCISES: CPT

## 2024-03-14 PROCEDURE — 36415 COLL VENOUS BLD VENIPUNCTURE: CPT

## 2024-03-14 PROCEDURE — 82947 ASSAY GLUCOSE BLOOD QUANT: CPT

## 2024-03-14 PROCEDURE — 80048 BASIC METABOLIC PNL TOTAL CA: CPT

## 2024-03-14 PROCEDURE — 85025 COMPLETE CBC W/AUTO DIFF WBC: CPT

## 2024-03-14 PROCEDURE — 97535 SELF CARE MNGMENT TRAINING: CPT

## 2024-03-14 PROCEDURE — 94761 N-INVAS EAR/PLS OXIMETRY MLT: CPT

## 2024-03-14 PROCEDURE — 2580000003 HC RX 258

## 2024-03-14 RX ORDER — OXYMETAZOLINE HYDROCHLORIDE 0.05 G/100ML
2 SPRAY NASAL 2 TIMES DAILY
Status: COMPLETED | OUTPATIENT
Start: 2024-03-14 | End: 2024-03-15

## 2024-03-14 RX ADMIN — INSULIN GLARGINE 40 UNITS: 100 INJECTION, SOLUTION SUBCUTANEOUS at 20:43

## 2024-03-14 RX ADMIN — ATORVASTATIN CALCIUM 80 MG: 80 TABLET, FILM COATED ORAL at 20:41

## 2024-03-14 RX ADMIN — MIDODRINE HYDROCHLORIDE 10 MG: 5 TABLET ORAL at 12:37

## 2024-03-14 RX ADMIN — SODIUM CHLORIDE, PRESERVATIVE FREE 10 ML: 5 INJECTION INTRAVENOUS at 09:02

## 2024-03-14 RX ADMIN — PANTOPRAZOLE SODIUM 40 MG: 40 TABLET, DELAYED RELEASE ORAL at 08:58

## 2024-03-14 RX ADMIN — TICAGRELOR 90 MG: 90 TABLET ORAL at 08:58

## 2024-03-14 RX ADMIN — FUROSEMIDE 40 MG: 40 TABLET ORAL at 08:57

## 2024-03-14 RX ADMIN — SODIUM CHLORIDE, PRESERVATIVE FREE 10 ML: 5 INJECTION INTRAVENOUS at 20:42

## 2024-03-14 RX ADMIN — PANTOPRAZOLE SODIUM 40 MG: 40 TABLET, DELAYED RELEASE ORAL at 16:50

## 2024-03-14 RX ADMIN — APIXABAN 5 MG: 5 TABLET, FILM COATED ORAL at 08:57

## 2024-03-14 RX ADMIN — ASPIRIN 81 MG 81 MG: 81 TABLET ORAL at 08:57

## 2024-03-14 RX ADMIN — METOPROLOL TARTRATE 12.5 MG: 25 TABLET, FILM COATED ORAL at 20:44

## 2024-03-14 RX ADMIN — TICAGRELOR 90 MG: 90 TABLET ORAL at 20:42

## 2024-03-14 RX ADMIN — MIDODRINE HYDROCHLORIDE 10 MG: 5 TABLET ORAL at 08:57

## 2024-03-14 RX ADMIN — Medication 2 SPRAY: at 20:48

## 2024-03-14 RX ADMIN — INSULIN LISPRO 8 UNITS: 100 INJECTION, SOLUTION INTRAVENOUS; SUBCUTANEOUS at 12:37

## 2024-03-14 RX ADMIN — MIDODRINE HYDROCHLORIDE 10 MG: 5 TABLET ORAL at 18:15

## 2024-03-14 RX ADMIN — EMPAGLIFLOZIN 10 MG: 10 TABLET, FILM COATED ORAL at 13:50

## 2024-03-14 RX ADMIN — APIXABAN 5 MG: 5 TABLET, FILM COATED ORAL at 20:41

## 2024-03-14 RX ADMIN — METOPROLOL TARTRATE 12.5 MG: 25 TABLET, FILM COATED ORAL at 09:02

## 2024-03-14 RX ADMIN — FUROSEMIDE 40 MG: 40 TABLET ORAL at 16:50

## 2024-03-14 RX ADMIN — Medication 2 SPRAY: at 09:00

## 2024-03-14 ASSESSMENT — PAIN SCALES - GENERAL
PAINLEVEL_OUTOF10: 0

## 2024-03-14 NOTE — PROGRESS NOTES
procurement, Patient/Caregiver education & training, Safety education & training, Pain management, Self-Care / ADL, Home management training     Restrictions  Restrictions/Precautions  Restrictions/Precautions: Fall Risk  Required Braces or Orthoses?: No (Heart hugger brace provided PRN d/t soreness in chest from previous CPR.)  Position Activity Restriction  Other position/activity restrictions: activity as tolerated, extubated 3/1, 2L SPO2 via NC    Subjective   General  Patient assessed for rehabilitation services?: Yes  Response to previous treatment: Patient with no complaints from previous session  Family / Caregiver Present: No  General Comment  Comments: Nursing OK'd for OT tx this date. Pt agreeable to therapy and pleasant/cooperative throughout. Pt reported 5/10 pain/soreness in chest d/t previous CPR, educated on use of hugging pillow to chest when coughing/sitting up for pain management, also repositioned for comfort at conclusion of tx with good return.    Objective   Safety Devices  Type of Devices: Call light within reach;Gait belt;Nurse notified;Left in chair;Chair alarm in place;Patient at risk for falls  Restraints  Restraints Initially in Place: No  Balance  Sitting: With support (Pt seated in recliner with support from back of chair during UB bathing and safety awareness education with SUP, occasional trunk flexion for unsupported sitting/reposition self with SBA. Overall sitting ~20 minutes.)  Standing: With support (Pt stand at recliner with use of RW and CGA, demos reaching above head/behind self and in front of self with no LOB, slight unsteadiness noted. Overall standing ~2:30 minutes.)  Transfer Training  Transfer Training: Yes  Overall Level of Assistance: Additional time;Adaptive equipment;Assist X1;Contact-guard assistance (Pt STS from recliner with use of RW and CGA, required verbal cues for safe hand placement with good return. Required increased time/effort  throughout.)  Interventions: Safety awareness training  Sit to Stand: Additional time;Adaptive equipment;Assist X1;Contact-guard assistance  Stand to Sit: Assist X1;Additional time;Adaptive equipment;Contact-guard assistance    ADL  Grooming: Supervision;Setup  Grooming Skilled Clinical Factors: Pt completed face washing while seated in recliner with SUP.  UE Bathing: Setup;Stand by assistance  UE Bathing Skilled Clinical Factors: Pt completed UB bathing while seated in recliner with SBA, demos functional reach to wash B underarms.  UE Dressing: Stand by assistance;Increased time to complete;Verbal cueing;Setup  UE Dressing Skilled Clinical Factors: Pt completed hospital gown change while seated in recliner with SBA for snaps/ties only, demos ability to fully unthread/thread BUEs.    Functional Mobility: Contact guard assistance;Adaptive equipment;Increased time to complete  Functional Mobility Skilled Clinical Factors: Pt completed 2 steps froward/retro while at recliner with use of RW and CGA. Pt declined further mobility d/t fatigue and decreased endurance.    Additional Comments: Upon arrival of therapist pt seated in recliner, agreeable to therapy. Pt engaged in UB bathing, functional transfers/short mobility, dynamic standing tasks, and retired to recliner with all needs met and call light within reach, nursing notified. Pt experiencing nosebleed for duration of tx, nursing aware and OK'd for OT tx. During bathing task pt blew nose into washcloth and dislodged clot/mass, nursing notified immediately and clot set aside for examination, nursing OK'd for OT tx to continue, noted that nosebleed did not worsen after blowing nose and pt denied pain in nose/throat. Pt declined further ADL care tasks d/t having washed up prior to OT tx. Pt engaged in reaching above head/behind self while standing to simulate ADL care tasks such as washing hair/perineal hygiene with no LOB. Pt demos fatigue with increased movement and  functional transfers/mobility SBA LRAD to increase engagement in meaningful occupations  Short Term Goal 5: demo overall activity tolerance 15+ minutes with EC implemented PRN (w/out cues) to facilitate increase IND during functional activity       Therapy Time   Individual Concurrent Group Co-treatment   Time In 1121         Time Out 1156         Minutes 35         Timed Code Treatment Minutes: 25 Minutes       DHEERAJ Espana/TROY

## 2024-03-14 NOTE — PROGRESS NOTES
Physical Therapy  Facility/Department: [facility] CAR 2- STEPDOWN  Physical Therapy Daily Treatment Note    Name: Kb Wilson  : 1945  MRN: [id number]  Date of Service: 3/14/2024    Discharge Recommendations:  Patient would benefit from continued therapy after discharge   PT Equipment Recommendations  Equipment Needed: No      Patient Diagnosis(es): The primary encounter diagnosis was Cardiac arrest (HCC). Diagnoses of ACS (acute coronary syndrome) (HCC), CAD in native artery, S/P primary angioplasty with coronary stent, Acute anemia, Acute blood loss anemia, and Stage 3 chronic kidney disease, unspecified whether stage 3a or 3b CKD (HCC) were also pertinent to this visit.  Past Medical History:  has a past medical history of CAD (coronary artery disease), CHF (congestive heart failure) (HCC), Diabetes mellitus (HCC), and Hypertension.  Past Surgical History:  has a past surgical history that includes Cardiac defibrillator placement; Coronary artery bypass graft; Upper gastrointestinal endoscopy (N/A, 2024); Colonoscopy (N/A, 2024); Cardiac procedure (N/A, 3/4/2024); and Cardiac procedure (N/A, 3/7/2024).    Assessment   Body Structures, Functions, Activity Limitations Requiring Skilled Therapeutic Intervention: Decreased functional mobility ;Decreased endurance;Decreased balance;Decreased strength  Assessment: Pt sat EOB for 20 mins with CGA with no LOB noted. Pt required CGA for STS with RW along with repeat verbal cues for hand placement/ sequencing with good return. Pt demos a satnding tolerance of 2-3 mins before required seated rest break. Pt presents with SOB with activity but SpO2 remained >90%. Pt was provided with repeat verbal cues to promote pursed lip breathing with good return. Pt is currently unsafe to return to prior living arrangements due to high fall risk. Pt would benefit from continued PT following discharge to address functional deficits.  Therapy Prognosis: Good  Decision Making:  assistance  Ambulation  Quality of Gait: fair stability, decreased stride length, no LOB     Balance  Posture: Fair  Sitting - Static: Fair;+  Sitting - Dynamic: Fair  Standing - Static: Fair  Standing - Dynamic: Fair  Comments: standing balance assessed while using a RW.  Exercise Treatment: IS x10  A/AROM Exercises: Ankle pumps x20 BLE, LAQs x20 BLE x2, KTC x20 BLE, SLR x10 BLE, Elbow flexion x20 BUE, x2 Shoulder flexion x15 BUE, Standing marches x20 BLE.          AM-PAC - Mobility  AM-PAC Basic Mobility - Inpatient   How much help is needed turning from your back to your side while in a flat bed without using bedrails?: A Little  How much help is needed moving from lying on your back to sitting on the side of a flat bed without using bedrails?: A Lot  How much help is needed moving to and from a bed to a chair?: A Lot  How much help is needed standing up from a chair using your arms?: A Little  How much help is needed walking in hospital room?: A Little  How much help is needed climbing 3-5 steps with a railing?: A Lot  AM-PAC Inpatient Mobility Raw Score : 15  AM-PAC Inpatient T-Scale Score : 39.45  Mobility Inpatient CMS 0-100% Score: 57.7  Mobility Inpatient CMS G-Code Modifier : CK      Goals  Short Term Goals  Time Frame for Short Term Goals: 14 visits  Short Term Goal 1: Pt will perform sit<>stand transfer with supervision.  Short Term Goal 2: Pt will ambulate 80 feet with least restrictive AD and SBA.  Short Term Goal 3: Pt will demonstrate fair+ dynamic standing balance to decrease fall risk.  Short Term Goal 4: Pt will tolerate a 35 minute therapy session to promote increased endurance.  Short Term Goal 5: Pt will perform supine<>sit transfer with SBA       Education  Patient Education  Education Given To: Patient  Education Provided: Role of Therapy;Plan of Care;Transfer Training;Fall Prevention Strategies  Education Method: Verbal  Barriers to Learning: None  Education Outcome: Verbalized

## 2024-03-14 NOTE — PLAN OF CARE
Problem: Chronic Conditions and Co-morbidities  Goal: Patient's chronic conditions and co-morbidity symptoms are monitored and maintained or improved  Outcome: Progressing  Flowsheets (Taken 3/14/2024 0900)  Care Plan - Patient's Chronic Conditions and Co-Morbidity Symptoms are Monitored and Maintained or Improved:   Monitor and assess patient's chronic conditions and comorbid symptoms for stability, deterioration, or improvement   Collaborate with multidisciplinary team to address chronic and comorbid conditions and prevent exacerbation or deterioration     Problem: Safety - Adult  Goal: Free from fall injury  Outcome: Progressing     Problem: Discharge Planning  Goal: Discharge to home or other facility with appropriate resources  Outcome: Progressing  Flowsheets (Taken 3/14/2024 0900)  Discharge to home or other facility with appropriate resources:   Identify barriers to discharge with patient and caregiver   Arrange for needed discharge resources and transportation as appropriate   Identify discharge learning needs (meds, wound care, etc)     Problem: Respiratory - Adult  Goal: Achieves optimal ventilation and oxygenation  Outcome: Progressing  Flowsheets (Taken 3/14/2024 0900)  Achieves optimal ventilation and oxygenation:   Assess for changes in respiratory status   Assess for changes in mentation and behavior   Position to facilitate oxygenation and minimize respiratory effort   Encourage broncho-pulmonary hygiene including cough, deep breathe, incentive spirometry     Problem: Pain  Goal: Verbalizes/displays adequate comfort level or baseline comfort level  Outcome: Progressing  Flowsheets (Taken 3/14/2024 1200)  Verbalizes/displays adequate comfort level or baseline comfort level:   Encourage patient to monitor pain and request assistance   Assess pain using appropriate pain scale   Administer analgesics based on type and severity of pain and evaluate response     Problem: Skin/Tissue Integrity  Goal:

## 2024-03-14 NOTE — PLAN OF CARE
Problem: Chronic Conditions and Co-morbidities  Goal: Patient's chronic conditions and co-morbidity symptoms are monitored and maintained or improved  3/13/2024 2301 by Rhett Pérez RN  Outcome: Progressing     Problem: Safety - Adult  Goal: Free from fall injury  3/13/2024 2301 by Rhett Pérez RN  Outcome: Progressing     Problem: Discharge Planning  Goal: Discharge to home or other facility with appropriate resources  3/13/2024 2301 by Rhett Pérez RN  Outcome: Progressing     Problem: Respiratory - Adult  Goal: Achieves optimal ventilation and oxygenation  3/13/2024 2301 by Rhett Pérez RN  Outcome: Progressing     Problem: Pain  Goal: Verbalizes/displays adequate comfort level or baseline comfort level  3/13/2024 2301 by Rhett Pérez RN  Outcome: Progressing     Problem: Skin/Tissue Integrity  Goal: Absence of new skin breakdown  Description: 1.  Monitor for areas of redness and/or skin breakdown  2.  Assess vascular access sites hourly  3.  Every 4-6 hours minimum:  Change oxygen saturation probe site  4.  Every 4-6 hours:  If on nasal continuous positive airway pressure, respiratory therapy assess nares and determine need for appliance change or resting period.  3/13/2024 2301 by Rhett Pérez RN  Outcome: Progressing     Problem: ABCDS Injury Assessment  Goal: Absence of physical injury  3/13/2024 2301 by Rhett Pérez RN  Outcome: Progressing     Problem: Nutrition Deficit:  Goal: Optimize nutritional status  3/13/2024 2301 by Rhett Pérez, RN  Outcome: Progressing

## 2024-03-14 NOTE — FLOWSHEET NOTE
Patient had multiple episodes of nose bleeding the past night. Notifed primary team of event, Afrin nasal spray ordered and given.    Last nose bleed episode at 10pm, but patient reports dried, clotted blood down at the back of his nose down to his throat. He is able to spit it out and suction it out using the oral Yankauer. Noted to be dark red in color with some clots on it. Informed primary of events. Vital signs stable. Will continue care.

## 2024-03-14 NOTE — PROGRESS NOTES
Renal Progress Note    Patient :  Kb Wilson; 78 y.o. MRN# [id number]  Location:  2001/2001-01  Attending:  Brenden Pedersen MD  Admit Date:  2/29/2024   Hospital Day: 14    Subjective:     Patient was seen and examined.  Blood pressure remains under good control.  Patient is hemodynamically stable.  Good urine output in the last 24 hours.  Patient continues to be in the negative fluid balance  Creatinine remains close to her baseline    Outpatient Medications:     Medications Prior to Admission: [DISCONTINUED] amiodarone (CORDARONE) 200 MG tablet, Take 1 tablet by mouth daily  atorvastatin (LIPITOR) 80 MG tablet, Take 1 tablet by mouth nightly  [DISCONTINUED] carvedilol (COREG) 3.125 MG tablet, Take 1 tablet by mouth 2 times daily (with meals)  [DISCONTINUED] aspirin 81 MG chewable tablet, Take 1 tablet by mouth daily (Patient not taking: Reported on 2/27/2024)  calcitRIOL (ROCALTROL) 0.25 MCG capsule, Take 1 capsule by mouth as needed Three times weekly  apixaban (ELIQUIS) 5 MG TABS tablet, Take 1 tablet by mouth 2 times daily  OZEMPIC, 0.25 OR 0.5 MG/DOSE, 2 MG/3ML SOPN, Inject 0.25 mLs into the skin once a week  insulin glargine (LANTUS;BASAGLAR) 100 UNIT/ML injection pen, Inject 40 Units into the skin daily (with breakfast)  dapagliflozin (FARXIGA) 10 MG tablet, Take 1 tablet by mouth daily  mirtazapine (REMERON) 7.5 MG tablet, Take 1 tablet by mouth nightly  [DISCONTINUED] famotidine (PEPCID) 40 MG tablet, Take 1 tablet by mouth daily  [DISCONTINUED] losartan (COZAAR) 25 MG tablet, Take 1 tablet by mouth daily  [DISCONTINUED] torsemide (DEMADEX) 20 MG tablet, Take 1 tablet by mouth daily    Current Medications:     Scheduled Meds:    furosemide  40 mg Oral BID    pantoprazole  40 mg Oral BID AC    insulin glargine  40 Units SubCUTAneous Nightly    metoprolol tartrate  12.5 mg Oral BID    ticagrelor  90 mg Oral BID    apixaban  5 mg Oral BID    lidocaine  1 patch TransDERmal Daily    insulin lispro  0-16 Units  SubCUTAneous TID WC    insulin lispro  0-4 Units SubCUTAneous Nightly    atorvastatin  80 mg Oral Nightly    aspirin  81 mg Oral Daily    midodrine  10 mg Oral TID WC    sodium chloride flush  5-40 mL IntraVENous 2 times per day     Input/Output:       I/O last 3 completed shifts:  In: -   Out: 2830 [Urine:2600; Blood:230].    Patient Vitals for the past 96 hrs (Last 3 readings):   Weight   24 0330 123.5 kg (272 lb 4.3 oz)   24 0600 124.6 kg (274 lb 11.1 oz)   24 0454 130.5 kg (287 lb 11.2 oz)       Vital Signs:   Temperature:  Temp: 98 °F (36.7 °C)  TMax:   Temp (24hrs), Av.8 °F (36.6 °C), Min:97.3 °F (36.3 °C), Max:98.1 °F (36.7 °C)    Respirations:  Respirations: 22  Pulse:   Pulse: 84  BP:    BP: 107/62  BP Range: Systolic (24hrs), Av , Min:101 , Max:131       Diastolic (24hrs), Av, Min:56, Max:81      Physical Examination:     General:  Alert and awake x    HEENT: Atraumatic and normocephalic  Eyes:   Pupils equal, round and reactive to light, EOMI.  Neck:   Supple  Chest:   Bilateral air entry, decreased at the bases  Cardiac:  S1 and S2 audible no S3.  Abdomen: Soft and nontender bowel sounds are positive  :   No suprapubic or flank tenderness.  Neuro:  AAO x 3, No FND.  SKIN:  No rashes, good skin turgor.  Extremities:  Trace to 1+ edema  Labs:       Recent Labs     24  0732 24  0603 24  0657   WBC 8.9 7.8 8.3   RBC 3.00* 2.96* 3.10*   HGB 8.4* 8.1* 8.5*   HCT 27.8* 27.4* 28.9*   MCV 92.7 92.6 93.2   MCH 28.0 27.4 27.4   MCHC 30.2 29.6 29.4   RDW 19.1* 18.9* 18.9*    253 257   MPV 10.5 10.6 10.6      BMP:   Recent Labs     24  0732 24  0603 24  0657    140 140   K 4.3 4.3 3.9   CL 98 99 97*   CO2 34* 33* 34*   BUN 23 24* 23   CREATININE 1.5* 1.6* 1.7*   GLUCOSE 156* 194* 174*   CALCIUM 7.8* 7.5* 7.9*     ANCA:      Lab Results   Component Value Date/Time    ANCA NEGATIVE 2024 05:47 AM     SPEP:  Lab Results   Component

## 2024-03-14 NOTE — PROGRESS NOTES
[facility]  Internal Medicine Teaching Residency Program  Inpatient Daily Progress Note  ______________________________________________________________________________    Patient: Kb Wilson  YOB: 1945   MRN:[id number]    Acct: [id number]     Room: 2001/2001-01  Admit date: 2/29/2024  Today's date: 03/14/24  Number of days in the hospital: 14    SUBJECTIVE   Admitting Diagnosis: Cardiac arrest (HCC)  CC: Bright red blood per rectal  Pt examined at bedside. Chart & results reviewed.     - Patient was afebrile and hemodynamically stable  - Patient saturating well on 2L  - Overnight,patient had nose bleed. In the am, it was slightly trickling. ENT was paged. They recommend changing cannula to humidified air mask. Afrin spray and pressure for 5 min. Discourage packing as can lead to trauma.  - Hemoglobin stable.      BRIEF HISTORY     Patient is a 78-year-old male with history of CAD status post CABG 2008 on Plavix, ICM status post AICD, paroxysmal A-fib on Eliquis, type 2 diabetes mellitus, hypertension and CKD stage III who initially presented to the [facility] ER on 2/27 for bright red blood when he wiped when going to the restroom.  CT abdomen pelvis without contrast suggested diverticulosis he was started on Protonix infusion.  Patient had EGD which was normal.  Later patient was given propofol for the colonoscopy after which patient had a cardiac arrest likely PEA/asystole a total of 3 times in the OR.  He is was intubated, started on vasopressors and transferred to ICU where he coded 2 more times.     He was transferred to [facility] for cardiac evaluation.     Before the cardiac arrest, patient was able to have some of the colonoscopy which revealed severe diverticulosis with no active bleed.  At that time GI recommended, if patient rebleeds then obtain CTA.  Patient did not have a bleed and patient's hemoglobin was stable, hence no further  Baseline Cr 1.6 -1.8  - Nephrology on board     T2DM  -  HbA1c 3/5/24 7.6  - At home, on Lantus 40 U daily and semaglutide  - On 40 U Lantus + HDSS     HLD  - Continue Atorvastatin 80 mg OD      JACKIE  - On CPAP at home    Pending placement to SNF.    Shayla Shi MD  Internal Medicine Resident, PGY-1  [facility]; [place], [place]  3/14/2024, 3:28 PM         Attending Physician Statement    I have discussed the care of Kb Wilson including pertinent history and exam findings with the resident. I have seen and examined the patient with the resident and the key elements of all parts of the encounter have been performed by me.  The orders were discussed and the medication list was reviewed with the resident and is up to date. I agree with the problem list, assessment and plan as documented by resident.    Brenden Pedersen MD  Pulmonary & Critical Care Medicine

## 2024-03-14 NOTE — PROGRESS NOTES
Pharmacy Medication Counseling Note    Apixaban counseling provided to Kb Lilianaish  Handouts provided to patient include: apixaban patient information    Counseling points included:  1. Indication for apixaban: Afib  2. Explanation of apixaban (blood thinner)  3. Dose and directions, including missed doses and timing of medication  4. Side effects: bleeding/bruising  5. Potential drug interactions   A. Cytochrome P450 3A4 inhibitors or inducers, Tomas de Castro's Wort, grapefuit juice  6. Signs and symptoms of VTE and stroke reviewed  7. Contact prescriber when:   A. Changes made to other medications   B. Signs or symptoms of VTE or stroke   C. Uncontrolled bleeding or unusual bruising    Answered all medication-related questions and patient verbalized understanding.     Jake Landon, Pharm Intern

## 2024-03-14 NOTE — CARE COORDINATION
Spoke to Keli from [facility]. They are not able to accept d/t no bed available    1027 met with patient to discuss transitional planning. Updated patient that [facility] is not able to accept. His next choices are [facility] and [facility]. Referrals sent. Patient reviewing list to provide more choices    1036 received call from Sarah Beth from [facility]. They are out of network with insurance    1218 spoke to Yuli from [facility]. They do not have any beds available. Met with patient to provide update. Referrals sent to [facility] and [facility] as requested by patient    1430 spoke to Korin from [facility]. They are able to accept but patient cannot receive Ozempic there and cannot bring his own supply. Spoke to Zeynep from [facility]. She does not have access to [facility]. Clinicals faxed. Patient updated    1620 spoke to Zeynep from [facility]. They are able to accept. Patient updated and would like [facility]. Requested Zeynep to start precert. Korin from [facility] updated

## 2024-03-15 VITALS
OXYGEN SATURATION: 90 % | TEMPERATURE: 98.2 F | BODY MASS INDEX: 32.89 KG/M2 | RESPIRATION RATE: 27 BRPM | SYSTOLIC BLOOD PRESSURE: 108 MMHG | WEIGHT: 264.55 LBS | HEIGHT: 75 IN | HEART RATE: 83 BPM | DIASTOLIC BLOOD PRESSURE: 92 MMHG

## 2024-03-15 PROBLEM — R04.0 EPISTAXIS, RECURRENT: Status: ACTIVE | Noted: 2024-03-15

## 2024-03-15 PROBLEM — N17.9 AKI (ACUTE KIDNEY INJURY) (HCC): Status: ACTIVE | Noted: 2024-03-15

## 2024-03-15 LAB
ANION GAP SERPL CALCULATED.3IONS-SCNC: 11 MMOL/L (ref 9–16)
BASOPHILS # BLD: 0.03 K/UL (ref 0–0.2)
BASOPHILS NFR BLD: 0 % (ref 0–2)
BUN SERPL-MCNC: 28 MG/DL (ref 8–23)
CALCIUM SERPL-MCNC: 7.9 MG/DL (ref 8.6–10.4)
CHLORIDE SERPL-SCNC: 96 MMOL/L (ref 98–107)
CO2 SERPL-SCNC: 32 MMOL/L (ref 20–31)
CREAT SERPL-MCNC: 1.7 MG/DL (ref 0.7–1.2)
EOSINOPHIL # BLD: 0.11 K/UL (ref 0–0.44)
EOSINOPHILS RELATIVE PERCENT: 1 % (ref 1–4)
ERYTHROCYTE [DISTWIDTH] IN BLOOD BY AUTOMATED COUNT: 18.6 % (ref 11.8–14.4)
GFR SERPL CREATININE-BSD FRML MDRD: 42 ML/MIN/1.73M2
GLUCOSE BLD-MCNC: 158 MG/DL (ref 75–110)
GLUCOSE BLD-MCNC: 210 MG/DL (ref 75–110)
GLUCOSE SERPL-MCNC: 182 MG/DL (ref 74–99)
HCT VFR BLD AUTO: 27.6 % (ref 40.7–50.3)
HGB BLD-MCNC: 8 G/DL (ref 13–17)
IMM GRANULOCYTES # BLD AUTO: 0.06 K/UL (ref 0–0.3)
IMM GRANULOCYTES NFR BLD: 1 %
LYMPHOCYTES NFR BLD: 1.62 K/UL (ref 1.1–3.7)
LYMPHOCYTES RELATIVE PERCENT: 19 % (ref 24–43)
MCH RBC QN AUTO: 27.4 PG (ref 25.2–33.5)
MCHC RBC AUTO-ENTMCNC: 29 G/DL (ref 28.4–34.8)
MCV RBC AUTO: 94.5 FL (ref 82.6–102.9)
MONOCYTES NFR BLD: 0.68 K/UL (ref 0.1–1.2)
MONOCYTES NFR BLD: 8 % (ref 3–12)
NEUTROPHILS NFR BLD: 71 % (ref 36–65)
NEUTS SEG NFR BLD: 6.19 K/UL (ref 1.5–8.1)
NRBC BLD-RTO: 0 PER 100 WBC
PLATELET # BLD AUTO: 230 K/UL (ref 138–453)
PMV BLD AUTO: 10.4 FL (ref 8.1–13.5)
POTASSIUM SERPL-SCNC: 3.8 MMOL/L (ref 3.7–5.3)
RBC # BLD AUTO: 2.92 M/UL (ref 4.21–5.77)
RBC # BLD: ABNORMAL 10*6/UL
SODIUM SERPL-SCNC: 139 MMOL/L (ref 136–145)
WBC OTHER # BLD: 8.7 K/UL (ref 3.5–11.3)

## 2024-03-15 PROCEDURE — 6370000000 HC RX 637 (ALT 250 FOR IP)

## 2024-03-15 PROCEDURE — 97530 THERAPEUTIC ACTIVITIES: CPT

## 2024-03-15 PROCEDURE — 6370000000 HC RX 637 (ALT 250 FOR IP): Performed by: INTERNAL MEDICINE

## 2024-03-15 PROCEDURE — 82947 ASSAY GLUCOSE BLOOD QUANT: CPT

## 2024-03-15 PROCEDURE — 2580000003 HC RX 258

## 2024-03-15 PROCEDURE — 97110 THERAPEUTIC EXERCISES: CPT

## 2024-03-15 PROCEDURE — 99221 1ST HOSP IP/OBS SF/LOW 40: CPT

## 2024-03-15 PROCEDURE — 85025 COMPLETE CBC W/AUTO DIFF WBC: CPT

## 2024-03-15 PROCEDURE — 6370000000 HC RX 637 (ALT 250 FOR IP): Performed by: STUDENT IN AN ORGANIZED HEALTH CARE EDUCATION/TRAINING PROGRAM

## 2024-03-15 PROCEDURE — 80048 BASIC METABOLIC PNL TOTAL CA: CPT

## 2024-03-15 PROCEDURE — 36415 COLL VENOUS BLD VENIPUNCTURE: CPT

## 2024-03-15 RX ADMIN — ASPIRIN 81 MG 81 MG: 81 TABLET ORAL at 08:21

## 2024-03-15 RX ADMIN — ACETAMINOPHEN 650 MG: 325 TABLET ORAL at 05:22

## 2024-03-15 RX ADMIN — MIDODRINE HYDROCHLORIDE 10 MG: 5 TABLET ORAL at 08:21

## 2024-03-15 RX ADMIN — SODIUM CHLORIDE, PRESERVATIVE FREE 10 ML: 5 INJECTION INTRAVENOUS at 08:22

## 2024-03-15 RX ADMIN — INSULIN LISPRO 4 UNITS: 100 INJECTION, SOLUTION INTRAVENOUS; SUBCUTANEOUS at 12:06

## 2024-03-15 RX ADMIN — PANTOPRAZOLE SODIUM 40 MG: 40 TABLET, DELAYED RELEASE ORAL at 05:22

## 2024-03-15 RX ADMIN — METOPROLOL TARTRATE 12.5 MG: 25 TABLET, FILM COATED ORAL at 08:21

## 2024-03-15 RX ADMIN — TICAGRELOR 90 MG: 90 TABLET ORAL at 08:15

## 2024-03-15 RX ADMIN — FUROSEMIDE 40 MG: 40 TABLET ORAL at 08:21

## 2024-03-15 RX ADMIN — EMPAGLIFLOZIN 10 MG: 10 TABLET, FILM COATED ORAL at 08:22

## 2024-03-15 RX ADMIN — APIXABAN 5 MG: 5 TABLET, FILM COATED ORAL at 08:21

## 2024-03-15 RX ADMIN — Medication 2 SPRAY: at 08:15

## 2024-03-15 RX ADMIN — MIDODRINE HYDROCHLORIDE 10 MG: 5 TABLET ORAL at 11:15

## 2024-03-15 NOTE — PROGRESS NOTES
[facility]  Internal Medicine Teaching Residency Program  Inpatient Daily Progress Note  ______________________________________________________________________________    Patient: Kb Wilson  YOB: 1945   MRN:[id number]    Acct: [id number]     Room: 2001/2001-01  Admit date: 2/29/2024  Today's date: 03/15/24  Number of days in the hospital: 15    SUBJECTIVE   Admitting Diagnosis: Cardiac arrest (HCC)  CC: Bright red blood per rectal  Pt examined at bedside. Chart & results reviewed.     - Patient was afebrile and hemodynamically stable  - Patient saturating well on 2.5 L  - Patient's nosebleed controlled. Coughed up clots in the am. On humidified breathing mask  - No acute concerns.  - Hemoglobin stable.      BRIEF HISTORY     Patient is a 78-year-old male with history of CAD status post CABG 2008 on Plavix, ICM status post AICD, paroxysmal A-fib on Eliquis, type 2 diabetes mellitus, hypertension and CKD stage III who initially presented to the [place] ER on 2/27 for bright red blood when he wiped when going to the restroom.  CT abdomen pelvis without contrast suggested diverticulosis he was started on Protonix infusion.  Patient had EGD which was normal.  Later patient was given propofol for the colonoscopy after which patient had a cardiac arrest likely PEA/asystole a total of 3 times in the OR.  He is was intubated, started on vasopressors and transferred to ICU where he coded 2 more times.     He was transferred to [facility] for cardiac evaluation.     Before the cardiac arrest, patient was able to have some of the colonoscopy which revealed severe diverticulosis with no active bleed.  At that time GI recommended, if patient rebleeds then obtain CTA.  Patient did not have a bleed and patient's hemoglobin was stable, hence no further intervention done. On 3/4, patient underwent diagnostic heart cath which revealed left main and multivessel CAD.   SubCUTAneous Nightly    atorvastatin  80 mg Oral Nightly    aspirin  81 mg Oral Daily    midodrine  10 mg Oral TID WC    sodium chloride flush  5-40 mL IntraVENous 2 times per day     Continuous Infusions:    sodium chloride      dextrose      sodium chloride Stopped (03/07/24 0520)     PRN Medicationssodium chloride flush, 5-40 mL, PRN  sodium chloride, , PRN  glucose, 4 tablet, PRN  dextrose bolus, 125 mL, PRN   Or  dextrose bolus, 250 mL, PRN  glucagon (rDNA), 1 mg, PRN  dextrose, , Continuous PRN  benzonatate, 100 mg, TID PRN  sodium chloride flush, 5-40 mL, PRN  sodium chloride, , PRN  potassium chloride, 20 mEq, PRN   Or  potassium chloride, 10 mEq, PRN  magnesium sulfate, 2,000 mg, PRN  ondansetron, 4 mg, Q8H PRN   Or  ondansetron, 4 mg, Q6H PRN  polyethylene glycol, 17 g, Daily PRN  acetaminophen, 650 mg, Q6H PRN   Or  acetaminophen, 650 mg, Q6H PRN        Diagnostic Labs:  CBC:   Recent Labs     03/13/24  0603 03/14/24  0657 03/15/24  0749   WBC 7.8 8.3 8.7   RBC 2.96* 3.10* 2.92*   HGB 8.1* 8.5* 8.0*   HCT 27.4* 28.9* 27.6*   MCV 92.6 93.2 94.5   RDW 18.9* 18.9* 18.6*    257 230     BMP:   Recent Labs     03/13/24  0603 03/14/24  0657    140   K 4.3 3.9   CL 99 97*   CO2 33* 34*   BUN 24* 23   CREATININE 1.6* 1.7*     BNP: No results for input(s): \"BNP\" in the last 72 hours.  PT/INR: No results for input(s): \"PROTIME\", \"INR\" in the last 72 hours.  APTT: No results for input(s): \"APTT\" in the last 72 hours.  CARDIAC ENZYMES: No results for input(s): \"CKMB\", \"CKMBINDEX\", \"TROPONINI\" in the last 72 hours.    Invalid input(s): \"CKTOTAL;3\"  FASTING LIPID PANEL:  Lab Results   Component Value Date    CHOL 134 02/23/2024    HDL 47 02/23/2024    TRIG 136 02/23/2024     LIVER PROFILE: No results for input(s): \"AST\", \"ALT\", \"ALB\", \"BILIDIR\", \"BILITOT\", \"ALKPHOS\" in the last 72 hours.   MICROBIOLOGY:   Lab Results   Component Value Date/Time    CULTURE NO GROWTH 5 DAYS 03/01/2024 05:58 PM  HbA1c 3/5/24 7.6  - At home, on Lantus 40 U daily and semaglutide  - On 40 U Lantus + HDSS     HLD  - Continue Atorvastatin 80 mg OD      JACKIE  - On CPAP at home    DVT Prophylaxis - Elliquis 5 mg BID  GI Prophylaxis - Protonix po 40 mg BID    Pending placement to SNF.    Shayla Shi MD  Internal Medicine Resident, PGY-1  [facility]; [place], [place]  3/15/2024, 8:31 AM

## 2024-03-15 NOTE — CONSULTS
CONSULTING SERVICE: Otolaryngology-Head and Neck Surgery    Informant:   The history was obtained from staff RN, chart review, and the patient.    Chief Complaint:   His chief complaint is epistaxis    History of Present Illness:   Kb Wilson is a 78 y.o. male seen consultation at the request of Dr. Shayla Shi on 3/14/2024.      Kb presented with history of CAD status post CABG 2008 on Plavix, ICM status post AICD, paroxysmal A-fib on Eliquis, type 2 diabetes mellitus, hypertension and CKD stage III who initially presented to the [place] ER on 2/27 for bright red blood when he wiped when going to the restroom.  CT abdomen pelvis without contrast suggested diverticulosis he was started on Protonix infusion.  Patient had EGD which was normal.  Later patient was given propofol for the colonoscopy after which patient had a cardiac arrest likely PEA/asystole a total of 3 times in the OR.  He is was intubated, started on vasopressors and transferred to ICU where he coded 2 more times.   He was transferred to [facility] for cardiac evaluation.     Before the cardiac arrest, patient was able to have some of the colonoscopy which revealed severe diverticulosis with no active bleed.  At that time GI recommended, if patient rebleeds then obtain CTA.  Patient did not have a bleed and patient's hemoglobin was stable, hence no further intervention done. On 3/4, patient underwent diagnostic heart cath which revealed left main and multivessel CAD.  Echo revealed ejection fraction of 20 to 25% with abnormal diastolic function similar to previous echo on 12/8/23.  On 3/7, patient underwent successful PCI to left main extending into proximal and mid left circumflex.  Cardiology recommended DAPT for next 6 months with aspirin and Brilinta after that followed by aspirin only and to continue Eliquis for A-fib.  Cardiology signed off.  Patient was extubated on 3/1.  Later he developed some pulmonary congestion and edema and    SpO2: 96%  98% 99%   Weight:       Height:           Constitutional   General Appearance: well developed and well nourished and in no acute distress  Speech: age appropriate    Head & Face   Head:   normocephalic and symmetric  Eyes: no eyelid swelling, no conjunctival injection or exudate, pupils equal round and reactive to light    Ears   Right EXT: normal  Left EXT: normal  Hearing: is responsive to whispered voice.       Nose   Dorsum: dorsum midline  Nasal mucosa: no edema.    Rhinorrhea: no drainage  Septum: midline. No perforation  Turbinates: no inferior turbinate hypertrophy  No active bleeding.  Scant old dried blood.  Blew out large blood clot a couple hours ago.  Oral Cavity, Oropharynx   Lips: normal  Dentition: dentition grossly normal   Oral mucosa: moist, pink  Gums: normal  Palate: intact, mobile  Pharynx: intact mobile  Posterior pharyngeal wall: normal, without active bleeding with some old blood that patient spits out with exam, no further bloody secretions or active bleeding visualized.  Tongue: intact, full range of motion; floor of mouth: no lesions  Tonsil size: normal    Neck   Trachea: midline  Thyroid: normal  Salivary glands: no parotid or submandibular masses or tenderness noted.   Lymphatic Nodes: no palpable adenopathy    Respiratory   Auscultation: not examined  Effort: no retractions noted  Voice: clear  Chest movement: symmetrical    Cardiac   Auscultation: not examined     Neuro/ Psych   Cranial Nerves: II-XII intact  Orientation: age appropriate  Mood & Affect: age appropriate    Additional data reviewed:    Laboratory Results:yes, HGB 8.5 3/14/24 6:57 am    Procedures:    None    Surgical risk factors:      -----------------------------------------------------------------  Visit Diagnosis/Assessment:   Kb is a 78 y.o. male with:  epistaxis     Cardiac arrest (HCC) [I46.9]    Discussed at length with patient epistaxis precautions listed below, along with treatment and

## 2024-03-15 NOTE — PLAN OF CARE
Problem: Chronic Conditions and Co-morbidities  Goal: Patient's chronic conditions and co-morbidity symptoms are monitored and maintained or improved  Outcome: Adequate for Discharge  Flowsheets (Taken 3/15/2024 0755)  Care Plan - Patient's Chronic Conditions and Co-Morbidity Symptoms are Monitored and Maintained or Improved:   Monitor and assess patient's chronic conditions and comorbid symptoms for stability, deterioration, or improvement   Collaborate with multidisciplinary team to address chronic and comorbid conditions and prevent exacerbation or deterioration   Update acute care plan with appropriate goals if chronic or comorbid symptoms are exacerbated and prevent overall improvement and discharge     Problem: Safety - Adult  Goal: Free from fall injury  Outcome: Adequate for Discharge  Flowsheets (Taken 3/15/2024 0907)  Free From Fall Injury:   Instruct family/caregiver on patient safety   Based on caregiver fall risk screen, instruct family/caregiver to ask for assistance with transferring infant if caregiver noted to have fall risk factors     Problem: Discharge Planning  Goal: Discharge to home or other facility with appropriate resources  Outcome: Adequate for Discharge  Flowsheets (Taken 3/15/2024 0755)  Discharge to home or other facility with appropriate resources:   Identify barriers to discharge with patient and caregiver   Arrange for needed discharge resources and transportation as appropriate   Identify discharge learning needs (meds, wound care, etc)   Refer to discharge planning if patient needs post-hospital services based on physician order or complex needs related to functional status, cognitive ability or social support system     Problem: Respiratory - Adult  Goal: Achieves optimal ventilation and oxygenation  Outcome: Adequate for Discharge  Flowsheets (Taken 3/15/2024 0755)  Achieves optimal ventilation and oxygenation:   Assess for changes in respiratory status   Assess for changes in  mentation and behavior   Position to facilitate oxygenation and minimize respiratory effort   Oxygen supplementation based on oxygen saturation or arterial blood gases   Encourage broncho-pulmonary hygiene including cough, deep breathe, incentive spirometry   Assess the need for suctioning and aspirate as needed   Assess and instruct to report shortness of breath or any respiratory difficulty   Respiratory therapy support as indicated     Problem: Pain  Goal: Verbalizes/displays adequate comfort level or baseline comfort level  Outcome: Adequate for Discharge     Problem: Skin/Tissue Integrity  Goal: Absence of new skin breakdown  Description: 1.  Monitor for areas of redness and/or skin breakdown  2.  Assess vascular access sites hourly  3.  Every 4-6 hours minimum:  Change oxygen saturation probe site  4.  Every 4-6 hours:  If on nasal continuous positive airway pressure, respiratory therapy assess nares and determine need for appliance change or resting period.  Outcome: Adequate for Discharge     Problem: ABCDS Injury Assessment  Goal: Absence of physical injury  Outcome: Adequate for Discharge  Flowsheets (Taken 3/15/2024 9673)  Absence of Physical Injury: Implement safety measures based on patient assessment     Problem: Nutrition Deficit:  Goal: Optimize nutritional status  Outcome: Adequate for Discharge

## 2024-03-15 NOTE — PROGRESS NOTES
Report called to Kiah at [facility] at 1500. All questions answered. Transport arrived at 1515, report given. IV removed. Patient d/c'd via wheelchair, on 2-3L NC. All documented belongings sent with patient.

## 2024-03-15 NOTE — PROGRESS NOTES
Renal Progress Note    Patient :  Kb Wilson; 78 y.o. MRN# [id number]  Location:  2001/2001-01  Attending:  Brenden Pedersen MD  Admit Date:  2/29/2024   Hospital Day: 15    Subjective:     Patient was seen and examined.  Patient is doing fairly well.  He was in bed.  Using a single Ventimask.  His oxygen saturation was good.  He was started on SGLT2 inhibitor.  He is also on Lasix by mouth.  Good urine output in last 24 hours and in negative fluid balance.    Outpatient Medications:     Medications Prior to Admission: [DISCONTINUED] amiodarone (CORDARONE) 200 MG tablet, Take 1 tablet by mouth daily  atorvastatin (LIPITOR) 80 MG tablet, Take 1 tablet by mouth nightly  [DISCONTINUED] carvedilol (COREG) 3.125 MG tablet, Take 1 tablet by mouth 2 times daily (with meals)  [DISCONTINUED] aspirin 81 MG chewable tablet, Take 1 tablet by mouth daily (Patient not taking: Reported on 2/27/2024)  calcitRIOL (ROCALTROL) 0.25 MCG capsule, Take 1 capsule by mouth as needed Three times weekly  apixaban (ELIQUIS) 5 MG TABS tablet, Take 1 tablet by mouth 2 times daily  OZEMPIC, 0.25 OR 0.5 MG/DOSE, 2 MG/3ML SOPN, Inject 0.25 mLs into the skin once a week  insulin glargine (LANTUS;BASAGLAR) 100 UNIT/ML injection pen, Inject 40 Units into the skin daily (with breakfast)  dapagliflozin (FARXIGA) 10 MG tablet, Take 1 tablet by mouth daily  mirtazapine (REMERON) 7.5 MG tablet, Take 1 tablet by mouth nightly  [DISCONTINUED] famotidine (PEPCID) 40 MG tablet, Take 1 tablet by mouth daily  [DISCONTINUED] losartan (COZAAR) 25 MG tablet, Take 1 tablet by mouth daily  [DISCONTINUED] torsemide (DEMADEX) 20 MG tablet, Take 1 tablet by mouth daily    Current Medications:     Scheduled Meds:    empagliflozin  10 mg Oral Daily    furosemide  40 mg Oral BID    pantoprazole  40 mg Oral BID AC    insulin glargine  40 Units SubCUTAneous Nightly    metoprolol tartrate  12.5 mg Oral BID    ticagrelor  90 mg Oral BID    apixaban  5 mg Oral BID    lidocaine   on a renal diet/TF. Avoid nephrotoxic drugs/contrast exposure.  Brady Lea MD  [facility]     This note is created with the assistance of a speech-recognition program. While intending to generate a document that actually reflects the content of the visit, no guarantees can be provided that every mistake has been identified and corrected by editing.

## 2024-03-15 NOTE — DISCHARGE INSTR - DIET

## 2024-03-15 NOTE — PROGRESS NOTES
Physical Therapy  Facility/Department: [facility]- [facility]  Physical Therapy Daily Treatment Note    Name: Kb Wilson  : 1945  MRN: [id number]  Date of Service: 3/15/2024    Discharge Recommendations:  Patient would benefit from continued therapy after discharge   PT Equipment Recommendations  Equipment Needed: No      Patient Diagnosis(es): The primary encounter diagnosis was Cardiac arrest (HCC). Diagnoses of ACS (acute coronary syndrome) (HCC), CAD in native artery, S/P primary angioplasty with coronary stent, Acute anemia, Acute blood loss anemia, and Stage 3 chronic kidney disease, unspecified whether stage 3a or 3b CKD (HCC) were also pertinent to this visit.  Past Medical History:  has a past medical history of CAD (coronary artery disease), CHF (congestive heart failure) (HCC), Diabetes mellitus (HCC), and Hypertension.  Past Surgical History:  has a past surgical history that includes Cardiac defibrillator placement; Coronary artery bypass graft; Upper gastrointestinal endoscopy (N/A, 2024); Colonoscopy (N/A, 2024); Cardiac procedure (N/A, 3/4/2024); and Cardiac procedure (N/A, 3/7/2024).    Assessment   Body Structures, Functions, Activity Limitations Requiring Skilled Therapeutic Intervention: Decreased functional mobility ;Decreased endurance;Decreased balance;Decreased strength  Assessment: Pt sat EOB for 15 mins while performing dynamic activities with no LOB noted. Pt required CGA for STS with RW along with repeat verbal cues for hand placement/ sequencing with good return. Pt demos a satnding tolerance of 2-3 mins before required seated rest break. Pt was provided with repeat verbal cues to promote pursed lip breathing with good return. Pt is currently unsafe to return to prior living arrangements due to high fall risk. Pt would benefit from continued PT following discharge to address functional deficits.  Therapy Prognosis: Good  Decision Making: Medium Complexity  Requires PT  Follow-Up: Yes  Activity Tolerance  Activity Tolerance: Patient tolerated treatment well;Patient limited by fatigue;Patient limited by endurance     Plan   Physical Therapy Plan  General Plan:  (5-6x/week)  Current Treatment Recommendations: Strengthening, Balance training, Functional mobility training, Transfer training, Gait training, Safety education & training, Home exercise program, Therapeutic activities, Patient/Caregiver education & training, Equipment evaluation, education, & procurement, Endurance training  Safety Devices  Type of Devices: Call light within reach, Gait belt, Nurse notified, Patient at risk for falls, Left in bed, Bed alarm in place  Restraints  Restraints Initially in Place: No     Restrictions  Restrictions/Precautions  Restrictions/Precautions: Fall Risk  Required Braces or Orthoses?: No  Position Activity Restriction  Other position/activity restrictions: activity as tolerated, extubated 3/1, 2L SPO2 via NC     Subjective   General  Chart Reviewed: Yes  Response To Previous Treatment: Patient with no complaints from previous session.  Family / Caregiver Present: No  Follows Commands: Within Functional Limits  General Comment  Comments: Pt supine in bed upon arrival/ exit.  Subjective  Subjective: RN agreeable to therapy.  Pt very pleasant and cooperative throughout.  Pt reports 3/10 chest pain at rest, pt repositioned for comfort, RN aware.          Cognition   Orientation  Overall Orientation Status: Within Functional Limits  Orientation Level: Oriented X4  Cognition  Overall Cognitive Status: WFL     Objective  Bed mobility  Supine to Sit: Stand by assistance  Sit to Supine: Stand by assistance  Scooting: Stand by assistance  Transfers  Sit to Stand: Contact guard assistance  Stand to Sit: Contact guard assistance  Ambulation  Quality of Gait: fair stability, decreased stride length, no LOB     Balance  Posture: Fair  Sitting - Static: Fair;+  Sitting - Dynamic: Fair  Standing -

## 2024-03-15 NOTE — PLAN OF CARE
Problem: Chronic Conditions and Co-morbidities  Goal: Patient's chronic conditions and co-morbidity symptoms are monitored and maintained or improved  3/14/2024 2248 by Rhett Pérez RN  Outcome: Progressing     Problem: Safety - Adult  Goal: Free from fall injury  3/14/2024 2248 by Rhett Pérez RN  Outcome: Progressing     Problem: Discharge Planning  Goal: Discharge to home or other facility with appropriate resources  3/14/2024 2248 by Rhett Pérez RN  Outcome: Progressing     Problem: Respiratory - Adult  Goal: Achieves optimal ventilation and oxygenation  3/14/2024 2248 by Rhett Pérez RN  Outcome: Progressing     Problem: Pain  Goal: Verbalizes/displays adequate comfort level or baseline comfort level  3/14/2024 2248 by Rhett Pérez RN  Outcome: Progressing     Problem: Skin/Tissue Integrity  Goal: Absence of new skin breakdown  Description: 1.  Monitor for areas of redness and/or skin breakdown  2.  Assess vascular access sites hourly  3.  Every 4-6 hours minimum:  Change oxygen saturation probe site  4.  Every 4-6 hours:  If on nasal continuous positive airway pressure, respiratory therapy assess nares and determine need for appliance change or resting period.  3/14/2024 2248 by Rhett Pérez RN  Outcome: Progressing     Problem: ABCDS Injury Assessment  Goal: Absence of physical injury  3/14/2024 2248 by Rhett Pérez RN  Outcome: Progressing     Problem: Nutrition Deficit:  Goal: Optimize nutritional status  3/14/2024 2248 by Rhett Pérez, RN  Outcome: Progressing

## 2024-03-15 NOTE — CARE COORDINATION
Received call from Zeynep from [facility]. Precert was submitted to insurance yesterday. They are requesting H&P, initial PT and OT, and updated PT and OT within 48 hours. Sent to Zeynep via fax    1243 received call from Zeynep from [facility]. Precert approved. Transportation request faxed to [facility]. Patient updated    1329 spoke to [place] from [facility]. Patient scheduled for transportation 3-3:30 pm. Patient updated. He will notify family. Zeynep from [facility] updated. AVS faxed. HENS completed    Discharge Report    [facility]  Clinical Case Management Department  Written by: Tati Velasco RN    Patient Name: Kb Wilson  Attending Provider: Brenden Pedersen MD  Admit Date: 2024  3:40 PM  MRN: [id number]  Account: [id number]                     : 1945  Discharge Date: 3/15/2024      Disposition: [facility]    Tati Velasco RN

## 2024-03-17 ENCOUNTER — HOSPITAL ENCOUNTER (OUTPATIENT)
Age: 79
Setting detail: SPECIMEN
Discharge: HOME | End: 2024-03-17

## 2024-03-17 LAB
ALBUMIN SERPL-MCNC: 3.3 G/DL (ref 3.5–5.2)
ALP SERPL-CCNC: 102 U/L (ref 40–129)
ALT SERPL-CCNC: 14 U/L (ref 5–41)
ANION GAP SERPL CALCULATED.3IONS-SCNC: 14 MMOL/L (ref 9–17)
AST SERPL-CCNC: 17 U/L
BILIRUB SERPL-MCNC: 0.7 MG/DL (ref 0.3–1.2)
BNP SERPL-MCNC: ABNORMAL PG/ML
BUN SERPL-MCNC: 30 MG/DL (ref 8–23)
BUN/CREAT SERPL: 16 (ref 9–20)
CALCIUM SERPL-MCNC: 8.5 MG/DL (ref 8.6–10.4)
CHLORIDE SERPL-SCNC: 93 MMOL/L (ref 98–107)
CO2 SERPL-SCNC: 34 MMOL/L (ref 20–31)
CREAT SERPL-MCNC: 1.9 MG/DL (ref 0.7–1.2)
ERYTHROCYTE [DISTWIDTH] IN BLOOD BY AUTOMATED COUNT: 18.7 % (ref 11.8–14.4)
GFR SERPL CREATININE-BSD FRML MDRD: 36 ML/MIN/1.73M2
GLUCOSE SERPL-MCNC: 283 MG/DL (ref 70–99)
HCT VFR BLD AUTO: 27.8 % (ref 40.7–50.3)
HGB BLD-MCNC: 7.9 G/DL (ref 13–17)
MCH RBC QN AUTO: 27.2 PG (ref 25.2–33.5)
MCHC RBC AUTO-ENTMCNC: 28.4 G/DL (ref 28.4–34.8)
MCV RBC AUTO: 95.9 FL (ref 82.6–102.9)
NRBC BLD-RTO: 0 PER 100 WBC
PLATELET # BLD AUTO: 273 K/UL (ref 138–453)
PMV BLD AUTO: 10.8 FL (ref 8.1–13.5)
POTASSIUM SERPL-SCNC: 4.2 MMOL/L (ref 3.7–5.3)
PROCALCITONIN SERPL-MCNC: 0.13 NG/ML (ref 0–0.09)
PROT SERPL-MCNC: 6.4 G/DL (ref 6.4–8.3)
RBC # BLD AUTO: 2.9 M/UL (ref 4.21–5.77)
SODIUM SERPL-SCNC: 141 MMOL/L (ref 135–144)
WBC OTHER # BLD: 11.3 K/UL (ref 3.5–11.3)

## 2024-03-17 PROCEDURE — 85027 COMPLETE CBC AUTOMATED: CPT

## 2024-03-17 PROCEDURE — P9603 ONE-WAY ALLOW PRORATED MILES: HCPCS

## 2024-03-17 PROCEDURE — 84145 PROCALCITONIN (PCT): CPT

## 2024-03-17 PROCEDURE — 36415 COLL VENOUS BLD VENIPUNCTURE: CPT

## 2024-03-17 PROCEDURE — 80053 COMPREHEN METABOLIC PANEL: CPT

## 2024-03-17 PROCEDURE — 83880 ASSAY OF NATRIURETIC PEPTIDE: CPT

## 2024-03-19 ENCOUNTER — HOSPITAL ENCOUNTER (OUTPATIENT)
Age: 79
Setting detail: SPECIMEN
Discharge: HOME | End: 2024-03-19

## 2024-03-19 LAB
ALBUMIN SERPL-MCNC: 3.2 G/DL (ref 3.5–5.2)
ALP SERPL-CCNC: 114 U/L (ref 40–129)
ALT SERPL-CCNC: 16 U/L (ref 5–41)
ANION GAP SERPL CALCULATED.3IONS-SCNC: 14 MMOL/L (ref 9–17)
AST SERPL-CCNC: 19 U/L
BILIRUB SERPL-MCNC: 0.9 MG/DL (ref 0.3–1.2)
BNP SERPL-MCNC: ABNORMAL PG/ML
BUN SERPL-MCNC: 38 MG/DL (ref 8–23)
BUN/CREAT SERPL: 17 (ref 9–20)
CALCIUM SERPL-MCNC: 8.4 MG/DL (ref 8.6–10.4)
CHLORIDE SERPL-SCNC: 94 MMOL/L (ref 98–107)
CO2 SERPL-SCNC: 30 MMOL/L (ref 20–31)
CREAT SERPL-MCNC: 2.2 MG/DL (ref 0.7–1.2)
ERYTHROCYTE [DISTWIDTH] IN BLOOD BY AUTOMATED COUNT: 18.6 % (ref 11.8–14.4)
GFR SERPL CREATININE-BSD FRML MDRD: 30 ML/MIN/1.73M2
GLUCOSE SERPL-MCNC: 202 MG/DL (ref 70–99)
HCT VFR BLD AUTO: 28 % (ref 40.7–50.3)
HGB BLD-MCNC: 7.7 G/DL (ref 13–17)
MCH RBC QN AUTO: 27 PG (ref 25.2–33.5)
MCHC RBC AUTO-ENTMCNC: 27.5 G/DL (ref 28.4–34.8)
MCV RBC AUTO: 98.2 FL (ref 82.6–102.9)
NRBC BLD-RTO: 0.3 PER 100 WBC
PLATELET # BLD AUTO: 183 K/UL (ref 138–453)
PMV BLD AUTO: 10.8 FL (ref 8.1–13.5)
POTASSIUM SERPL-SCNC: 4.6 MMOL/L (ref 3.7–5.3)
PROT SERPL-MCNC: 6.6 G/DL (ref 6.4–8.3)
RBC # BLD AUTO: 2.85 M/UL (ref 4.21–5.77)
SODIUM SERPL-SCNC: 138 MMOL/L (ref 135–144)
WBC OTHER # BLD: 8.8 K/UL (ref 3.5–11.3)

## 2024-03-19 PROCEDURE — 85027 COMPLETE CBC AUTOMATED: CPT

## 2024-03-19 PROCEDURE — 36415 COLL VENOUS BLD VENIPUNCTURE: CPT

## 2024-03-19 PROCEDURE — 80053 COMPREHEN METABOLIC PANEL: CPT

## 2024-03-19 PROCEDURE — P9603 ONE-WAY ALLOW PRORATED MILES: HCPCS

## 2024-03-19 PROCEDURE — 83880 ASSAY OF NATRIURETIC PEPTIDE: CPT

## 2024-03-20 ENCOUNTER — HOSPITAL ENCOUNTER (OUTPATIENT)
Age: 79
Setting detail: SPECIMEN
Discharge: HOME | End: 2024-03-20

## 2024-03-20 LAB
ANION GAP SERPL CALCULATED.3IONS-SCNC: 11 MMOL/L (ref 9–17)
BASOPHILS # BLD: 0 K/UL (ref 0–0.2)
BASOPHILS NFR BLD: 0 %
BUN SERPL-MCNC: 39 MG/DL (ref 8–23)
BUN/CREAT SERPL: 17 (ref 9–20)
CALCIUM SERPL-MCNC: 8.2 MG/DL (ref 8.6–10.4)
CHLORIDE SERPL-SCNC: 95 MMOL/L (ref 98–107)
CO2 SERPL-SCNC: 34 MMOL/L (ref 20–31)
CREAT SERPL-MCNC: 2.3 MG/DL (ref 0.7–1.2)
EOSINOPHIL # BLD: 0.09 K/UL (ref 0–0.4)
EOSINOPHILS RELATIVE PERCENT: 1 % (ref 1–4)
ERYTHROCYTE [DISTWIDTH] IN BLOOD BY AUTOMATED COUNT: 18.3 % (ref 11.8–14.4)
EST. AVERAGE GLUCOSE BLD GHB EST-MCNC: 160 MG/DL
GFR SERPL CREATININE-BSD FRML MDRD: 29 ML/MIN/1.73M2
GLUCOSE SERPL-MCNC: 127 MG/DL (ref 70–99)
HBA1C MFR BLD: 7.2 % (ref 4–6)
HCT VFR BLD AUTO: 25.2 % (ref 40.7–50.3)
HGB BLD-MCNC: 7.1 G/DL (ref 13–17)
IMM GRANULOCYTES # BLD AUTO: 0 K/UL (ref 0–0.3)
IMM GRANULOCYTES NFR BLD: 0 %
LYMPHOCYTES NFR BLD: 2.61 K/UL (ref 1–4.8)
LYMPHOCYTES RELATIVE PERCENT: 29 % (ref 24–44)
MCH RBC QN AUTO: 26.6 PG (ref 25.2–33.5)
MCHC RBC AUTO-ENTMCNC: 28.2 G/DL (ref 28.4–34.8)
MCV RBC AUTO: 94.4 FL (ref 82.6–102.9)
MONOCYTES NFR BLD: 0.81 K/UL (ref 0.2–0.8)
MONOCYTES NFR BLD: 9 % (ref 1–7)
MORPHOLOGY: ABNORMAL
NEUTROPHILS NFR BLD: 61 % (ref 36–66)
NEUTS SEG NFR BLD: 5.49 K/UL (ref 1.8–7.7)
NRBC BLD-RTO: 0.4 PER 100 WBC
PLATELET # BLD AUTO: 177 K/UL (ref 138–453)
PMV BLD AUTO: 11.1 FL (ref 8.1–13.5)
POTASSIUM SERPL-SCNC: 4.2 MMOL/L (ref 3.7–5.3)
RBC # BLD AUTO: 2.67 M/UL (ref 4.21–5.77)
SODIUM SERPL-SCNC: 140 MMOL/L (ref 135–144)
WBC OTHER # BLD: 9 K/UL (ref 3.5–11.3)

## 2024-03-20 PROCEDURE — 83036 HEMOGLOBIN GLYCOSYLATED A1C: CPT

## 2024-03-20 PROCEDURE — 85025 COMPLETE CBC W/AUTO DIFF WBC: CPT

## 2024-03-20 PROCEDURE — 80048 BASIC METABOLIC PNL TOTAL CA: CPT

## 2024-03-20 PROCEDURE — 36415 COLL VENOUS BLD VENIPUNCTURE: CPT

## 2024-03-20 PROCEDURE — P9603 ONE-WAY ALLOW PRORATED MILES: HCPCS

## 2024-03-24 PROBLEM — R79.89 ELEVATED TROPONIN: Status: RESOLVED | Noted: 2024-02-23 | Resolved: 2024-03-24
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
## 2024-03-29 ENCOUNTER — HOSPITAL ENCOUNTER (OUTPATIENT)
Age: 79
Setting detail: SPECIMEN
Discharge: HOME | End: 2024-03-29

## 2024-03-29 LAB
AMMONIA PLAS-SCNC: 55 UMOL/L (ref 16–60)
ANION GAP SERPL CALCULATED.3IONS-SCNC: 8 MMOL/L (ref 9–17)
BNP SERPL-MCNC: ABNORMAL PG/ML
BUN SERPL-MCNC: 49 MG/DL (ref 8–23)
BUN/CREAT SERPL: 18 (ref 9–20)
CALCIUM SERPL-MCNC: 7.9 MG/DL (ref 8.6–10.4)
CHLORIDE SERPL-SCNC: 95 MMOL/L (ref 98–107)
CO2 SERPL-SCNC: 38 MMOL/L (ref 20–31)
CREAT SERPL-MCNC: 2.7 MG/DL (ref 0.7–1.2)
ERYTHROCYTE [DISTWIDTH] IN BLOOD BY AUTOMATED COUNT: 17.6 % (ref 11.8–14.4)
GFR SERPL CREATININE-BSD FRML MDRD: 24 ML/MIN/1.73M2
GLUCOSE SERPL-MCNC: 334 MG/DL (ref 70–99)
HCT VFR BLD AUTO: 31.8 % (ref 40.7–50.3)
HGB BLD-MCNC: 8.9 G/DL (ref 13–17)
MCH RBC QN AUTO: 26.1 PG (ref 25.2–33.5)
MCHC RBC AUTO-ENTMCNC: 28 G/DL (ref 28.4–34.8)
MCV RBC AUTO: 93.3 FL (ref 82.6–102.9)
NRBC BLD-RTO: 0 PER 100 WBC
PLATELET # BLD AUTO: 189 K/UL (ref 138–453)
PMV BLD AUTO: 11.3 FL (ref 8.1–13.5)
POTASSIUM SERPL-SCNC: 3.7 MMOL/L (ref 3.7–5.3)
RBC # BLD AUTO: 3.41 M/UL (ref 4.21–5.77)
SODIUM SERPL-SCNC: 141 MMOL/L (ref 135–144)
WBC OTHER # BLD: 6.7 K/UL (ref 3.5–11.3)

## 2024-03-29 PROCEDURE — 83880 ASSAY OF NATRIURETIC PEPTIDE: CPT

## 2024-03-29 PROCEDURE — 82140 ASSAY OF AMMONIA: CPT

## 2024-03-29 PROCEDURE — 36415 COLL VENOUS BLD VENIPUNCTURE: CPT

## 2024-03-29 PROCEDURE — 85027 COMPLETE CBC AUTOMATED: CPT

## 2024-03-29 PROCEDURE — 80048 BASIC METABOLIC PNL TOTAL CA: CPT

## (undated) DEVICE — GUIDEWIRE 35/260/FC/PTFE/3J: Brand: GUIDEWIRE

## (undated) DEVICE — ANGIO-SEAL VIP VASCULAR CLOSURE DEVICE: Brand: ANGIO-SEAL

## (undated) DEVICE — CATH BLLN ANGIO 3X20MM NC EUPHORIA RX

## (undated) DEVICE — Device

## (undated) DEVICE — KIT MICRO INTRO 4FR STIFF 40CM NIGHTENALL TUNGSTEN 7SMT

## (undated) DEVICE — INTRODUCER SHTH 6FR L11CM 0.038IN STD SIDEPRT EXTN 3 W

## (undated) DEVICE — GUIDEWIRE WITH ICE™ HYDROPHILIC COATING: Brand: LUGE™

## (undated) DEVICE — GLOVE ORANGE PI 7 1/2   MSG9075

## (undated) DEVICE — ADAPTER CLEANING PORPOISE CLEANING

## (undated) DEVICE — BITEBLOCK 54FR W/ DENT RIM BLOX

## (undated) DEVICE — CATH LITHOPLSTY 2.5X12MM SHOCKWAVE

## (undated) DEVICE — Device: Brand: DEFENDO VALVE AND CONNECTOR KIT

## (undated) DEVICE — CATH BLLN ANGIO 2X15MM SC EUPHORA RX

## (undated) DEVICE — INTRODUCER SHTH 0.018 IN 4 FRX70 CM 21 GAX7 CM KT MIC VSI

## (undated) DEVICE — PERRYSBURG ENDO PACK: Brand: MEDLINE INDUSTRIES, INC.

## (undated) DEVICE — 4-PORT MANIFOLD: Brand: NEPTUNE 2

## (undated) DEVICE — CATHETER GUID EXTRA BACKUP 3.5 0.070IN 6FR 100CM VISTA BRITE TIP

## (undated) DEVICE — ANGIOGRAPHIC CATHETER: Brand: EXPO™

## (undated) DEVICE — SURGICAL PROCEDURE TRAY CRD CATH SVMMC